# Patient Record
Sex: MALE | Race: WHITE | Employment: PART TIME | URBAN - METROPOLITAN AREA
[De-identification: names, ages, dates, MRNs, and addresses within clinical notes are randomized per-mention and may not be internally consistent; named-entity substitution may affect disease eponyms.]

---

## 2019-03-28 ENCOUNTER — TELEPHONE (OUTPATIENT)
Dept: GASTROENTEROLOGY | Age: 34
End: 2019-03-28

## 2019-04-10 ENCOUNTER — HOSPITAL ENCOUNTER (OUTPATIENT)
Age: 34
Discharge: HOME | End: 2019-04-10
Payer: COMMERCIAL

## 2019-04-10 PROCEDURE — 93005 ELECTROCARDIOGRAM TRACING: CPT

## 2019-04-11 LAB
EKG ATRIAL RATE: 74 BPM
EKG P AXIS: 59 DEGREES
EKG P-R INTERVAL: 138 MS
EKG Q-T INTERVAL: 410 MS
EKG QRS DURATION: 96 MS
EKG QTC CALCULATION (BAZETT): 455 MS
EKG R AXIS: 73 DEGREES
EKG T AXIS: 47 DEGREES
EKG VENTRICULAR RATE: 74 BPM

## 2019-08-30 ENCOUNTER — TELEPHONE (OUTPATIENT)
Dept: GASTROENTEROLOGY | Age: 34
End: 2019-08-30

## 2019-09-03 ENCOUNTER — OFFICE VISIT (OUTPATIENT)
Dept: GASTROENTEROLOGY | Age: 34
End: 2019-09-03
Payer: COMMERCIAL

## 2019-09-03 ENCOUNTER — HOSPITAL ENCOUNTER (OUTPATIENT)
Age: 34
Discharge: HOME | End: 2019-09-03
Payer: COMMERCIAL

## 2019-09-03 VITALS
HEART RATE: 92 BPM | SYSTOLIC BLOOD PRESSURE: 131 MMHG | BODY MASS INDEX: 19.29 KG/M2 | DIASTOLIC BLOOD PRESSURE: 88 MMHG | WEIGHT: 146.2 LBS

## 2019-09-03 DIAGNOSIS — B18.2 CHRONIC HEPATITIS C WITHOUT HEPATIC COMA (HCC): Primary | ICD-10-CM

## 2019-09-03 DIAGNOSIS — F19.10 DRUG ABUSE (HCC): ICD-10-CM

## 2019-09-03 DIAGNOSIS — R53.1 WEAKNESS: ICD-10-CM

## 2019-09-03 DIAGNOSIS — B18.2 CHRONIC HEPATITIS C WITHOUT HEPATIC COMA (HCC): ICD-10-CM

## 2019-09-03 DIAGNOSIS — R10.11 ABDOMINAL PAIN, RIGHT UPPER QUADRANT: ICD-10-CM

## 2019-09-03 LAB
ABSOLUTE EOS #: 0.3 K/UL (ref 0–0.4)
ABSOLUTE IMMATURE GRANULOCYTE: ABNORMAL K/UL (ref 0–0.3)
ABSOLUTE LYMPH #: 1.9 K/UL (ref 1–4.8)
ABSOLUTE MONO #: 0.4 K/UL (ref 0.1–1.3)
AFP: 5.8 UG/L
ALBUMIN SERPL-MCNC: 5.2 G/DL (ref 3.5–5.2)
ALBUMIN/GLOBULIN RATIO: ABNORMAL (ref 1–2.5)
ALP BLD-CCNC: 96 U/L (ref 40–129)
ALPHA-1 ANTITRYPSIN: 146 MG/DL (ref 90–200)
ALT SERPL-CCNC: 24 U/L (ref 5–41)
ANION GAP SERPL CALCULATED.3IONS-SCNC: 16 MMOL/L (ref 9–17)
AST SERPL-CCNC: 22 U/L
BASOPHILS # BLD: 1 % (ref 0–2)
BASOPHILS ABSOLUTE: 0 K/UL (ref 0–0.2)
BILIRUB SERPL-MCNC: 0.28 MG/DL (ref 0.3–1.2)
BUN BLDV-MCNC: 11 MG/DL (ref 6–20)
BUN/CREAT BLD: ABNORMAL (ref 9–20)
CALCIUM SERPL-MCNC: 10.1 MG/DL (ref 8.6–10.4)
CERULOPLASMIN: 21 MG/DL (ref 15–30)
CHLORIDE BLD-SCNC: 100 MMOL/L (ref 98–107)
CO2: 24 MMOL/L (ref 20–31)
CREAT SERPL-MCNC: 0.8 MG/DL (ref 0.7–1.2)
DIFFERENTIAL TYPE: ABNORMAL
EOSINOPHILS RELATIVE PERCENT: 5 % (ref 0–4)
FERRITIN: 502 UG/L (ref 30–400)
GFR AFRICAN AMERICAN: >60 ML/MIN
GFR NON-AFRICAN AMERICAN: >60 ML/MIN
GFR SERPL CREATININE-BSD FRML MDRD: ABNORMAL ML/MIN/{1.73_M2}
GFR SERPL CREATININE-BSD FRML MDRD: ABNORMAL ML/MIN/{1.73_M2}
GLUCOSE BLD-MCNC: 79 MG/DL (ref 70–99)
HAV AB SERPL IA-ACNC: NONREACTIVE
HBV SURFACE AB TITR SER: 4.67 MIU/ML
HCT VFR BLD CALC: 37.2 % (ref 41–53)
HEMOGLOBIN: 12.6 G/DL (ref 13.5–17.5)
HEPATITIS B CORE TOTAL ANTIBODY: NONREACTIVE
HEPATITIS B SURFACE ANTIGEN: NONREACTIVE
IMMATURE GRANULOCYTES: ABNORMAL %
LYMPHOCYTES # BLD: 35 % (ref 24–44)
MCH RBC QN AUTO: 30.2 PG (ref 26–34)
MCHC RBC AUTO-ENTMCNC: 34 G/DL (ref 31–37)
MCV RBC AUTO: 88.9 FL (ref 80–100)
MONOCYTES # BLD: 7 % (ref 1–7)
NRBC AUTOMATED: ABNORMAL PER 100 WBC
PDW BLD-RTO: 13 % (ref 11.5–14.9)
PLATELET # BLD: 237 K/UL (ref 150–450)
PLATELET ESTIMATE: ABNORMAL
PMV BLD AUTO: 8.3 FL (ref 6–12)
POTASSIUM SERPL-SCNC: 3.8 MMOL/L (ref 3.7–5.3)
RBC # BLD: 4.19 M/UL (ref 4.5–5.9)
RBC # BLD: ABNORMAL 10*6/UL
SEG NEUTROPHILS: 52 % (ref 36–66)
SEGMENTED NEUTROPHILS ABSOLUTE COUNT: 3 K/UL (ref 1.3–9.1)
SODIUM BLD-SCNC: 140 MMOL/L (ref 135–144)
TOTAL PROTEIN: 8.4 G/DL (ref 6.4–8.3)
WBC # BLD: 5.6 K/UL (ref 3.5–11)
WBC # BLD: ABNORMAL 10*3/UL

## 2019-09-03 PROCEDURE — 82728 ASSAY OF FERRITIN: CPT

## 2019-09-03 PROCEDURE — 82390 ASSAY OF CERULOPLASMIN: CPT

## 2019-09-03 PROCEDURE — 85025 COMPLETE CBC W/AUTO DIFF WBC: CPT

## 2019-09-03 PROCEDURE — 86704 HEP B CORE ANTIBODY TOTAL: CPT

## 2019-09-03 PROCEDURE — 86708 HEPATITIS A ANTIBODY: CPT

## 2019-09-03 PROCEDURE — 87902 NFCT AGT GNTYP ALYS HEP C: CPT

## 2019-09-03 PROCEDURE — 82977 ASSAY OF GGT: CPT

## 2019-09-03 PROCEDURE — 80053 COMPREHEN METABOLIC PANEL: CPT

## 2019-09-03 PROCEDURE — 82105 ALPHA-FETOPROTEIN SERUM: CPT

## 2019-09-03 PROCEDURE — 87340 HEPATITIS B SURFACE AG IA: CPT

## 2019-09-03 PROCEDURE — 86317 IMMUNOASSAY INFECTIOUS AGENT: CPT

## 2019-09-03 PROCEDURE — G8427 DOCREV CUR MEDS BY ELIG CLIN: HCPCS | Performed by: INTERNAL MEDICINE

## 2019-09-03 PROCEDURE — 84460 ALANINE AMINO (ALT) (SGPT): CPT

## 2019-09-03 PROCEDURE — 87522 HEPATITIS C REVRS TRNSCRPJ: CPT

## 2019-09-03 PROCEDURE — 82103 ALPHA-1-ANTITRYPSIN TOTAL: CPT

## 2019-09-03 PROCEDURE — G8420 CALC BMI NORM PARAMETERS: HCPCS | Performed by: INTERNAL MEDICINE

## 2019-09-03 PROCEDURE — 99244 OFF/OP CNSLTJ NEW/EST MOD 40: CPT | Performed by: INTERNAL MEDICINE

## 2019-09-03 PROCEDURE — 86038 ANTINUCLEAR ANTIBODIES: CPT

## 2019-09-03 PROCEDURE — 84520 ASSAY OF UREA NITROGEN: CPT

## 2019-09-03 PROCEDURE — 84450 TRANSFERASE (AST) (SGOT): CPT

## 2019-09-03 PROCEDURE — 83516 IMMUNOASSAY NONANTIBODY: CPT

## 2019-09-03 PROCEDURE — 83883 ASSAY NEPHELOMETRY NOT SPEC: CPT

## 2019-09-03 PROCEDURE — 36415 COLL VENOUS BLD VENIPUNCTURE: CPT

## 2019-09-03 RX ORDER — METHADONE HYDROCHLORIDE 10 MG/5ML
205 SOLUTION ORAL
COMMUNITY

## 2019-09-03 ASSESSMENT — ENCOUNTER SYMPTOMS
BACK PAIN: 1
SORE THROAT: 0
DIARRHEA: 0
CHOKING: 0
BLOOD IN STOOL: 0
TROUBLE SWALLOWING: 0
RESPIRATORY NEGATIVE: 1
SINUS PRESSURE: 0
WHEEZING: 0
COUGH: 0
ALLERGIC/IMMUNOLOGIC NEGATIVE: 1
ABDOMINAL DISTENTION: 0
RECTAL PAIN: 0
CONSTIPATION: 1
ANAL BLEEDING: 1
VOICE CHANGE: 0
NAUSEA: 0
EYES NEGATIVE: 1
VOMITING: 0
ABDOMINAL PAIN: 1

## 2019-09-03 NOTE — PROGRESS NOTES
Subjective:      Patient ID: Teri Lo is a 36 y.o. male. HPI    Dr. Chante Grant PA has requested that I see Teri Lo for a consult for   1. Chronic hepatitis C without hepatic coma ([facility] [address]    2. Weakness    3. Drug abuse ([facility] [address]    4. Abdominal pain, right upper quadrant     . This patient is seen in my office for the first time with his fiancee  He has hx of IVDA   Has hx of Narcotics pain meds use  Has been diagnosed with Hep C  Has some fatigue  Has non specific RUQ pains  Has no overt bleeding  Has no melena  Has mild fatigue  Patient has been complaining of some abdominal pains, off and on cramping  Also complains of abdominal bloating and gas  Has off and on nausea without any sig vomiting  Has some alternating constipation and diarrhea  Has no weight loss  Has some anxiety issues    Past Medical, Family, and Social History reviewed and does contribute to the patient presenting condition. patient\"s PMH/PSH,SH,PSYCH hx, MEDs, ALLERGIES, and ROS was all reviewed and updated ion the appropriate sections    Review of Systems   Constitutional: Positive for fatigue. Negative for appetite change and unexpected weight change. HENT: Negative. Negative for dental problem, postnasal drip, sinus pressure, sore throat, trouble swallowing and voice change. Eyes: Negative. Negative for visual disturbance. Respiratory: Negative. Negative for cough, choking and wheezing. Cardiovascular: Negative. Negative for chest pain, palpitations and leg swelling. Gastrointestinal: Positive for abdominal pain, anal bleeding and constipation. Negative for abdominal distention, blood in stool, diarrhea, nausea, rectal pain and vomiting. Endocrine: Negative. Genitourinary: Negative. Negative for difficulty urinating. Musculoskeletal: Positive for arthralgias and back pain. Negative for gait problem and myalgias. Skin: Negative. Allergic/Immunologic: Negative.   Negative for environmental allergies and food allergies. Neurological: Positive for weakness. Negative for dizziness, light-headedness, numbness and headaches. Hematological: Negative. Does not bruise/bleed easily. Psychiatric/Behavioral: Positive for sleep disturbance. The patient is nervous/anxious. Reviewed and agree  Objective:   Physical Exam   Constitutional: He is oriented to person, place, and time. He appears well-developed and well-nourished. Anxious    HENT:   Head: Normocephalic and atraumatic. Eyes: Pupils are equal, round, and reactive to light. Conjunctivae and EOM are normal.   Neck: Normal range of motion. Neck supple. Cardiovascular: Normal rate and regular rhythm. Pulmonary/Chest: Effort normal and breath sounds normal.   Abdominal: Soft. Bowel sounds are normal.   NON TENDER, NON DISTENTED  LIVER SPLEEN AND HERNIAS ARE NOT  PALPABLE  BOWEL SOUNDS ARE POSITIVE      Genitourinary: Rectum normal.   Musculoskeletal: Normal range of motion. Neurological: He is alert and oriented to person, place, and time. He has normal reflexes. Skin: Skin is warm. Vitals reviewed. Assessment:      Patient Active Problem List   Diagnosis    Viral hepatitis C           Plan: This patient has been diagnosed with hep C. The significance of the diagnosis was explained to him in detail  Probable mode of transmission were discussed with him  The natural course of hep C was explained to him   The current modes available for the treatment of Hep C including the Medicines were explained to him in detail. Newer treatment options and their success rates were also explained to him in detail.      The side effect profile of these medicines were explained to him in detail as well  The significance of quitting Alcohol was also explained     he was explained about the importance of copmplaince  with the treatment and regular follow up    Will order necessary blood tests and Imaging studies for further

## 2019-09-04 LAB — ANTI-NUCLEAR ANTIBODY (ANA): NEGATIVE

## 2019-09-05 LAB
ALANINE AMINOTRANSFERASE, FIBROMETER: 29 U/L (ref 5–50)
ALPHA-2-MACROGLOBULIN, FIBROMETER: 198 MG/DL (ref 131–293)
ASPARTATE AMINOTRANSFERASE, FIBROMETER: 27 U/L (ref 9–50)
CIRRHOMETER PATIENT SCORE: 0.01
EER FIBROMETER REPORT: NORMAL
FIBROMETER INTERPRETATION: NORMAL
FIBROMETER PATIENT SCORE: 0.21
FIBROMETER PLATELET COUNT: 241
FIBROMETER PROTHROMBIN INDEX: 98 % (ref 90–120)
FIBROSIS METAVIR CLASSIFICATION: NORMAL
GAMMA GLUTAMYL TRANSFERASE, FIBROMETER: 29 U/L (ref 7–51)
INFLAMETER METAVIR CLASSIFICATION: NORMAL
INFLAMETER PATIENT SCORE: 0.25
MITOCHONDRIAL ANTIBODY: 6.3 UNITS (ref 0–20)
SMOOTH MUSCLE ANTIBODY: 6 UNITS (ref 0–19)
UREA NITROGEN, FIBROMETER: 11 MG/DL (ref 7–20)

## 2019-09-06 LAB
DIRECT EXAM: ABNORMAL
DIRECT EXAM: ABNORMAL
Lab: ABNORMAL
SPECIMEN DESCRIPTION: ABNORMAL

## 2019-09-08 LAB
HCV QUANTITATIVE: NORMAL
HEPATITIS C GENOTYPE: NORMAL

## 2019-09-17 ENCOUNTER — TELEPHONE (OUTPATIENT)
Dept: GASTROENTEROLOGY | Age: 34
End: 2019-09-17

## 2019-09-17 NOTE — TELEPHONE ENCOUNTER
Pt returned call. States he didn't know that he needed an US. Did give pt central scheduling number to call and did ask him to call writer back to let us know when it is scheduled. Also, did schedule pt for vaccination on 10/8/19.

## 2019-09-27 ENCOUNTER — HOSPITAL ENCOUNTER (OUTPATIENT)
Dept: ULTRASOUND IMAGING | Age: 34
Discharge: HOME | End: 2019-09-29
Payer: COMMERCIAL

## 2019-09-27 DIAGNOSIS — B18.2 CHRONIC HEPATITIS C WITHOUT HEPATIC COMA (HCC): ICD-10-CM

## 2019-09-27 PROCEDURE — 76705 ECHO EXAM OF ABDOMEN: CPT

## 2019-10-08 ENCOUNTER — NURSE ONLY (OUTPATIENT)
Dept: GASTROENTEROLOGY | Age: 34
End: 2019-10-08
Payer: COMMERCIAL

## 2019-10-08 VITALS — HEART RATE: 76 BPM | SYSTOLIC BLOOD PRESSURE: 132 MMHG | TEMPERATURE: 97.2 F | DIASTOLIC BLOOD PRESSURE: 83 MMHG

## 2019-10-08 DIAGNOSIS — Q45.3 PANCREATIC DUCTAL ABNORMALITY: ICD-10-CM

## 2019-10-08 DIAGNOSIS — R10.11 ABDOMINAL PAIN, RIGHT UPPER QUADRANT: ICD-10-CM

## 2019-10-08 DIAGNOSIS — Z23 NEED FOR HEPATITIS A AND B VACCINATION: ICD-10-CM

## 2019-10-08 DIAGNOSIS — B18.2 CHRONIC HEPATITIS C WITHOUT HEPATIC COMA (HCC): Primary | ICD-10-CM

## 2019-10-08 PROCEDURE — 90632 HEPA VACCINE ADULT IM: CPT | Performed by: INTERNAL MEDICINE

## 2019-10-08 PROCEDURE — 90471 IMMUNIZATION ADMIN: CPT | Performed by: INTERNAL MEDICINE

## 2019-10-08 PROCEDURE — 90746 HEPB VACCINE 3 DOSE ADULT IM: CPT | Performed by: INTERNAL MEDICINE

## 2019-10-08 PROCEDURE — 90472 IMMUNIZATION ADMIN EACH ADD: CPT | Performed by: INTERNAL MEDICINE

## 2019-10-15 ENCOUNTER — HOSPITAL ENCOUNTER (OUTPATIENT)
Dept: MRI IMAGING | Age: 34
Discharge: HOME | End: 2019-10-17
Payer: COMMERCIAL

## 2019-10-15 DIAGNOSIS — R10.11 ABDOMINAL PAIN, RIGHT UPPER QUADRANT: ICD-10-CM

## 2019-10-15 DIAGNOSIS — B18.2 CHRONIC HEPATITIS C WITHOUT HEPATIC COMA (HCC): ICD-10-CM

## 2019-10-15 DIAGNOSIS — Q45.3 PANCREATIC DUCTAL ABNORMALITY: ICD-10-CM

## 2019-10-15 PROCEDURE — A9576 INJ PROHANCE MULTIPACK: HCPCS | Performed by: INTERNAL MEDICINE

## 2019-10-15 PROCEDURE — 6360000004 HC RX CONTRAST MEDICATION: Performed by: INTERNAL MEDICINE

## 2019-10-15 PROCEDURE — 74183 MRI ABD W/O CNTR FLWD CNTR: CPT

## 2019-10-15 RX ORDER — SODIUM CHLORIDE 0.9 % (FLUSH) 0.9 %
10 SYRINGE (ML) INJECTION 2 TIMES DAILY
Status: DISCONTINUED | OUTPATIENT
Start: 2019-10-15 | End: 2019-10-18 | Stop reason: HOSPADM

## 2019-10-15 RX ADMIN — GADOTERIDOL 13 ML: 279.3 INJECTION, SOLUTION INTRAVENOUS at 10:36

## 2019-10-23 ENCOUNTER — TELEPHONE (OUTPATIENT)
Dept: GASTROENTEROLOGY | Age: 34
End: 2019-10-23

## 2019-11-12 ENCOUNTER — NURSE ONLY (OUTPATIENT)
Dept: GASTROENTEROLOGY | Age: 34
End: 2019-11-12
Payer: COMMERCIAL

## 2019-11-12 VITALS — HEART RATE: 82 BPM | SYSTOLIC BLOOD PRESSURE: 131 MMHG | TEMPERATURE: 97.1 F | DIASTOLIC BLOOD PRESSURE: 89 MMHG

## 2019-11-12 DIAGNOSIS — B18.2 CHRONIC HEPATITIS C WITHOUT HEPATIC COMA (HCC): Primary | ICD-10-CM

## 2019-11-12 DIAGNOSIS — Z23 NEED FOR HEPATITIS A AND B VACCINATION: ICD-10-CM

## 2019-11-12 PROCEDURE — 90471 IMMUNIZATION ADMIN: CPT | Performed by: INTERNAL MEDICINE

## 2019-11-12 PROCEDURE — 90746 HEPB VACCINE 3 DOSE ADULT IM: CPT | Performed by: INTERNAL MEDICINE

## 2019-11-12 PROCEDURE — 99999 PR OFFICE/OUTPT VISIT,PROCEDURE ONLY: CPT | Performed by: INTERNAL MEDICINE

## 2020-02-11 ENCOUNTER — TELEPHONE (OUTPATIENT)
Dept: GASTROENTEROLOGY | Age: 35
End: 2020-02-11

## 2020-08-14 ENCOUNTER — TELEPHONE (OUTPATIENT)
Dept: GASTROENTEROLOGY | Age: 35
End: 2020-08-14

## 2020-08-14 NOTE — TELEPHONE ENCOUNTER
Left message informing patient that appointment on 9/21 will be rescheduled to 9/29 at 300 pm due to the provider being out of the office. If this does not work for the patient please reschedule. complains of pain/discomfort

## 2020-11-02 ENCOUNTER — TELEPHONE (OUTPATIENT)
Dept: GASTROENTEROLOGY | Age: 35
End: 2020-11-02

## 2021-07-06 ENCOUNTER — APPOINTMENT (OUTPATIENT)
Dept: CT IMAGING | Age: 36
End: 2021-07-06
Payer: COMMERCIAL

## 2021-07-06 ENCOUNTER — HOSPITAL ENCOUNTER (OUTPATIENT)
Age: 36
Setting detail: OBSERVATION
Discharge: HOME | End: 2021-07-06
Attending: EMERGENCY MEDICINE | Admitting: EMERGENCY MEDICINE
Payer: COMMERCIAL

## 2021-07-06 VITALS
WEIGHT: 150 LBS | SYSTOLIC BLOOD PRESSURE: 126 MMHG | RESPIRATION RATE: 18 BRPM | HEIGHT: 73 IN | BODY MASS INDEX: 19.88 KG/M2 | DIASTOLIC BLOOD PRESSURE: 68 MMHG | TEMPERATURE: 98.2 F | HEART RATE: 77 BPM | OXYGEN SATURATION: 98 %

## 2021-07-06 DIAGNOSIS — F13.930 BENZODIAZEPINE WITHDRAWAL WITHOUT COMPLICATION (HCC): Primary | ICD-10-CM

## 2021-07-06 DIAGNOSIS — R56.9 SEIZURE (HCC): ICD-10-CM

## 2021-07-06 LAB
ABSOLUTE EOS #: 0 K/UL (ref 0–0.4)
ABSOLUTE IMMATURE GRANULOCYTE: 0.1 K/UL (ref 0–0.3)
ABSOLUTE LYMPH #: 0.97 K/UL (ref 1–4.8)
ABSOLUTE MONO #: 0.19 K/UL (ref 0.1–0.8)
ACETAMINOPHEN LEVEL: <5 UG/ML (ref 10–30)
ANION GAP SERPL CALCULATED.3IONS-SCNC: 22 MMOL/L (ref 9–17)
BASOPHILS # BLD: 0 % (ref 0–2)
BASOPHILS ABSOLUTE: 0 K/UL (ref 0–0.2)
BUN BLDV-MCNC: 6 MG/DL (ref 6–20)
BUN/CREAT BLD: ABNORMAL (ref 9–20)
CALCIUM SERPL-MCNC: 9.9 MG/DL (ref 8.6–10.4)
CHLORIDE BLD-SCNC: 98 MMOL/L (ref 98–107)
CHP ED QC CHECK: YES
CO2: 15 MMOL/L (ref 20–31)
CREAT SERPL-MCNC: 0.86 MG/DL (ref 0.7–1.2)
DIFFERENTIAL TYPE: ABNORMAL
EOSINOPHILS RELATIVE PERCENT: 0 % (ref 1–4)
ETHANOL PERCENT: <0.01 %
ETHANOL: <10 MG/DL
GFR AFRICAN AMERICAN: >60 ML/MIN
GFR NON-AFRICAN AMERICAN: >60 ML/MIN
GFR SERPL CREATININE-BSD FRML MDRD: ABNORMAL ML/MIN/{1.73_M2}
GFR SERPL CREATININE-BSD FRML MDRD: ABNORMAL ML/MIN/{1.73_M2}
GLUCOSE BLD-MCNC: 147 MG/DL
GLUCOSE BLD-MCNC: 147 MG/DL (ref 75–110)
GLUCOSE BLD-MCNC: 175 MG/DL (ref 70–99)
HCT VFR BLD CALC: 41.6 % (ref 40.7–50.3)
HEMOGLOBIN: 13.6 G/DL (ref 13–17)
IMMATURE GRANULOCYTES: 1 %
LYMPHOCYTES # BLD: 10 % (ref 24–44)
MCH RBC QN AUTO: 28.8 PG (ref 25.2–33.5)
MCHC RBC AUTO-ENTMCNC: 32.7 G/DL (ref 28.4–34.8)
MCV RBC AUTO: 87.9 FL (ref 82.6–102.9)
MONOCYTES # BLD: 2 % (ref 1–7)
MORPHOLOGY: NORMAL
MYOGLOBIN: 383 NG/ML (ref 28–72)
NRBC AUTOMATED: 0 PER 100 WBC
PDW BLD-RTO: 12.5 % (ref 11.8–14.4)
PLATELET # BLD: 346 K/UL (ref 138–453)
PLATELET ESTIMATE: ABNORMAL
PMV BLD AUTO: 10.7 FL (ref 8.1–13.5)
POTASSIUM SERPL-SCNC: 3.9 MMOL/L (ref 3.7–5.3)
RBC # BLD: 4.73 M/UL (ref 4.21–5.77)
RBC # BLD: ABNORMAL 10*6/UL
SALICYLATE LEVEL: <1 MG/DL (ref 3–10)
SEG NEUTROPHILS: 87 % (ref 36–66)
SEGMENTED NEUTROPHILS ABSOLUTE COUNT: 8.44 K/UL (ref 1.8–7.7)
SODIUM BLD-SCNC: 135 MMOL/L (ref 135–144)
TOTAL CK: 194 U/L (ref 39–308)
TOXIC TRICYCLIC SC,BLOOD: NEGATIVE
WBC # BLD: 9.7 K/UL (ref 3.5–11.3)
WBC # BLD: ABNORMAL 10*3/UL

## 2021-07-06 PROCEDURE — 80143 DRUG ASSAY ACETAMINOPHEN: CPT

## 2021-07-06 PROCEDURE — 83874 ASSAY OF MYOGLOBIN: CPT

## 2021-07-06 PROCEDURE — 70450 CT HEAD/BRAIN W/O DYE: CPT

## 2021-07-06 PROCEDURE — 6360000002 HC RX W HCPCS: Performed by: STUDENT IN AN ORGANIZED HEALTH CARE EDUCATION/TRAINING PROGRAM

## 2021-07-06 PROCEDURE — 96372 THER/PROPH/DIAG INJ SC/IM: CPT

## 2021-07-06 PROCEDURE — G0378 HOSPITAL OBSERVATION PER HR: HCPCS

## 2021-07-06 PROCEDURE — 80048 BASIC METABOLIC PNL TOTAL CA: CPT

## 2021-07-06 PROCEDURE — 6370000000 HC RX 637 (ALT 250 FOR IP): Performed by: STUDENT IN AN ORGANIZED HEALTH CARE EDUCATION/TRAINING PROGRAM

## 2021-07-06 PROCEDURE — 82550 ASSAY OF CK (CPK): CPT

## 2021-07-06 PROCEDURE — 93005 ELECTROCARDIOGRAM TRACING: CPT | Performed by: EMERGENCY MEDICINE

## 2021-07-06 PROCEDURE — 80307 DRUG TEST PRSMV CHEM ANLYZR: CPT

## 2021-07-06 PROCEDURE — 2580000003 HC RX 258: Performed by: STUDENT IN AN ORGANIZED HEALTH CARE EDUCATION/TRAINING PROGRAM

## 2021-07-06 PROCEDURE — 99285 EMERGENCY DEPT VISIT HI MDM: CPT

## 2021-07-06 PROCEDURE — 85025 COMPLETE CBC W/AUTO DIFF WBC: CPT

## 2021-07-06 PROCEDURE — 96374 THER/PROPH/DIAG INJ IV PUSH: CPT

## 2021-07-06 PROCEDURE — 80179 DRUG ASSAY SALICYLATE: CPT

## 2021-07-06 PROCEDURE — G0480 DRUG TEST DEF 1-7 CLASSES: HCPCS

## 2021-07-06 PROCEDURE — 82947 ASSAY GLUCOSE BLOOD QUANT: CPT

## 2021-07-06 RX ORDER — ACETAMINOPHEN 650 MG/1
650 SUPPOSITORY RECTAL EVERY 6 HOURS PRN
Status: DISCONTINUED | OUTPATIENT
Start: 2021-07-06 | End: 2021-07-06 | Stop reason: HOSPADM

## 2021-07-06 RX ORDER — METHADONE HYDROCHLORIDE 5 MG/5ML
205 SOLUTION ORAL DAILY
Status: DISCONTINUED | OUTPATIENT
Start: 2021-07-06 | End: 2021-07-06 | Stop reason: HOSPADM

## 2021-07-06 RX ORDER — SODIUM CHLORIDE 9 MG/ML
25 INJECTION, SOLUTION INTRAVENOUS PRN
Status: DISCONTINUED | OUTPATIENT
Start: 2021-07-06 | End: 2021-07-06 | Stop reason: SDUPTHER

## 2021-07-06 RX ORDER — ACETAMINOPHEN 325 MG/1
650 TABLET ORAL EVERY 4 HOURS PRN
Status: DISCONTINUED | OUTPATIENT
Start: 2021-07-06 | End: 2021-07-06 | Stop reason: SDUPTHER

## 2021-07-06 RX ORDER — ONDANSETRON 4 MG/1
4 TABLET, ORALLY DISINTEGRATING ORAL EVERY 8 HOURS PRN
Status: DISCONTINUED | OUTPATIENT
Start: 2021-07-06 | End: 2021-07-06

## 2021-07-06 RX ORDER — SODIUM CHLORIDE 0.9 % (FLUSH) 0.9 %
5-40 SYRINGE (ML) INJECTION PRN
Status: DISCONTINUED | OUTPATIENT
Start: 2021-07-06 | End: 2021-07-06 | Stop reason: SDUPTHER

## 2021-07-06 RX ORDER — LORAZEPAM 1 MG/1
1 TABLET ORAL
Status: DISCONTINUED | OUTPATIENT
Start: 2021-07-06 | End: 2021-07-06 | Stop reason: HOSPADM

## 2021-07-06 RX ORDER — LORAZEPAM 2 MG/ML
1 INJECTION INTRAMUSCULAR
Status: DISCONTINUED | OUTPATIENT
Start: 2021-07-06 | End: 2021-07-06 | Stop reason: HOSPADM

## 2021-07-06 RX ORDER — LORAZEPAM 2 MG/1
4 TABLET ORAL
Status: DISCONTINUED | OUTPATIENT
Start: 2021-07-06 | End: 2021-07-06 | Stop reason: HOSPADM

## 2021-07-06 RX ORDER — LORAZEPAM 2 MG/ML
2 INJECTION INTRAMUSCULAR ONCE
Status: COMPLETED | OUTPATIENT
Start: 2021-07-06 | End: 2021-07-06

## 2021-07-06 RX ORDER — LORAZEPAM 2 MG/ML
2 INJECTION INTRAMUSCULAR ONCE
Status: DISCONTINUED | OUTPATIENT
Start: 2021-07-06 | End: 2021-07-06

## 2021-07-06 RX ORDER — SODIUM CHLORIDE, SODIUM LACTATE, POTASSIUM CHLORIDE, AND CALCIUM CHLORIDE .6; .31; .03; .02 G/100ML; G/100ML; G/100ML; G/100ML
1000 INJECTION, SOLUTION INTRAVENOUS ONCE
Status: COMPLETED | OUTPATIENT
Start: 2021-07-06 | End: 2021-07-06

## 2021-07-06 RX ORDER — LORAZEPAM 2 MG/ML
4 INJECTION INTRAMUSCULAR
Status: DISCONTINUED | OUTPATIENT
Start: 2021-07-06 | End: 2021-07-06 | Stop reason: HOSPADM

## 2021-07-06 RX ORDER — LORAZEPAM 2 MG/1
2 TABLET ORAL
Status: DISCONTINUED | OUTPATIENT
Start: 2021-07-06 | End: 2021-07-06 | Stop reason: HOSPADM

## 2021-07-06 RX ORDER — LORAZEPAM 2 MG/ML
3 INJECTION INTRAMUSCULAR
Status: DISCONTINUED | OUTPATIENT
Start: 2021-07-06 | End: 2021-07-06 | Stop reason: HOSPADM

## 2021-07-06 RX ORDER — CLONAZEPAM 1 MG/1
1 TABLET ORAL 2 TIMES DAILY PRN
Status: DISCONTINUED | OUTPATIENT
Start: 2021-07-06 | End: 2021-07-06 | Stop reason: HOSPADM

## 2021-07-06 RX ORDER — ACETAMINOPHEN 325 MG/1
650 TABLET ORAL EVERY 6 HOURS PRN
Status: DISCONTINUED | OUTPATIENT
Start: 2021-07-06 | End: 2021-07-06 | Stop reason: HOSPADM

## 2021-07-06 RX ORDER — SODIUM CHLORIDE 9 MG/ML
25 INJECTION, SOLUTION INTRAVENOUS PRN
Status: DISCONTINUED | OUTPATIENT
Start: 2021-07-06 | End: 2021-07-06 | Stop reason: HOSPADM

## 2021-07-06 RX ORDER — SODIUM CHLORIDE 0.9 % (FLUSH) 0.9 %
5-40 SYRINGE (ML) INJECTION EVERY 12 HOURS SCHEDULED
Status: DISCONTINUED | OUTPATIENT
Start: 2021-07-06 | End: 2021-07-06 | Stop reason: SDUPTHER

## 2021-07-06 RX ORDER — SODIUM CHLORIDE 0.9 % (FLUSH) 0.9 %
5-40 SYRINGE (ML) INJECTION EVERY 12 HOURS SCHEDULED
Status: DISCONTINUED | OUTPATIENT
Start: 2021-07-06 | End: 2021-07-06 | Stop reason: HOSPADM

## 2021-07-06 RX ORDER — ONDANSETRON 4 MG/1
4 TABLET, ORALLY DISINTEGRATING ORAL EVERY 8 HOURS PRN
Status: DISCONTINUED | OUTPATIENT
Start: 2021-07-06 | End: 2021-07-06 | Stop reason: HOSPADM

## 2021-07-06 RX ORDER — CLONAZEPAM 1 MG/1
1 TABLET ORAL 2 TIMES DAILY PRN
Qty: 30 TABLET | Refills: 0 | Status: SHIPPED | OUTPATIENT
Start: 2021-07-06 | End: 2021-07-23 | Stop reason: SDUPTHER

## 2021-07-06 RX ORDER — LORAZEPAM 2 MG/ML
2 INJECTION INTRAMUSCULAR
Status: DISCONTINUED | OUTPATIENT
Start: 2021-07-06 | End: 2021-07-06 | Stop reason: HOSPADM

## 2021-07-06 RX ORDER — SODIUM CHLORIDE 0.9 % (FLUSH) 0.9 %
5-40 SYRINGE (ML) INJECTION PRN
Status: DISCONTINUED | OUTPATIENT
Start: 2021-07-06 | End: 2021-07-06 | Stop reason: HOSPADM

## 2021-07-06 RX ORDER — ONDANSETRON 2 MG/ML
4 INJECTION INTRAMUSCULAR; INTRAVENOUS EVERY 6 HOURS PRN
Status: DISCONTINUED | OUTPATIENT
Start: 2021-07-06 | End: 2021-07-06

## 2021-07-06 RX ORDER — POLYETHYLENE GLYCOL 3350 17 G/17G
17 POWDER, FOR SOLUTION ORAL DAILY PRN
Status: DISCONTINUED | OUTPATIENT
Start: 2021-07-06 | End: 2021-07-06 | Stop reason: HOSPADM

## 2021-07-06 RX ORDER — ONDANSETRON 2 MG/ML
4 INJECTION INTRAMUSCULAR; INTRAVENOUS EVERY 6 HOURS PRN
Status: DISCONTINUED | OUTPATIENT
Start: 2021-07-06 | End: 2021-07-06 | Stop reason: HOSPADM

## 2021-07-06 RX ORDER — METHADONE HYDROCHLORIDE 10 MG/1
205 TABLET ORAL EVERY 4 HOURS PRN
Status: DISCONTINUED | OUTPATIENT
Start: 2021-07-06 | End: 2021-07-06

## 2021-07-06 RX ADMIN — LORAZEPAM 2 MG: 2 INJECTION INTRAMUSCULAR; INTRAVENOUS at 02:41

## 2021-07-06 RX ADMIN — METHADONE HYDROCHLORIDE 205 MG: 5 SOLUTION ORAL at 10:17

## 2021-07-06 RX ADMIN — SODIUM CHLORIDE, POTASSIUM CHLORIDE, SODIUM LACTATE AND CALCIUM CHLORIDE 1000 ML: 600; 310; 30; 20 INJECTION, SOLUTION INTRAVENOUS at 05:35

## 2021-07-06 RX ADMIN — SODIUM CHLORIDE, PRESERVATIVE FREE 10 ML: 5 INJECTION INTRAVENOUS at 10:02

## 2021-07-06 RX ADMIN — LORAZEPAM 2 MG: 2 INJECTION INTRAMUSCULAR; INTRAVENOUS at 07:53

## 2021-07-06 RX ADMIN — LORAZEPAM 2 MG: 2 INJECTION INTRAMUSCULAR; INTRAVENOUS at 10:24

## 2021-07-06 ASSESSMENT — ENCOUNTER SYMPTOMS
SHORTNESS OF BREATH: 0
STRIDOR: 0
FACIAL SWELLING: 0
WHEEZING: 0
ABDOMINAL DISTENTION: 0
ABDOMINAL PAIN: 0
RHINORRHEA: 0
APNEA: 0

## 2021-07-06 ASSESSMENT — PAIN SCALES - GENERAL
PAINLEVEL_OUTOF10: 8
PAINLEVEL_OUTOF10: 0
PAINLEVEL_OUTOF10: 0

## 2021-07-06 NOTE — CONSULTS
[id number] [facility]   IN-PATIENT SERVICE      NEUROLOGY CONSULT  NOTE            Date:   7/6/2021  Patient name:  Keisha Wang  Date of admission:  7/6/2021  YOB: 1985      Chief Complaint:     Chief Complaint   Patient presents with    Seizures       Reason for Consult:      seizures    History of Present Illness: The patient is a 29 y.o. male  with history of WALT, MDD, heroin use- on Methadone, and withdrawal seizures was brought in by EMS after 3 episodes of generalized tonic clonic seizures. Patient is now back to his baseline mental state. He is having generalized tremors of all 4 extremities. Patient unable to recall having a seizure, however, he denies tongue biting, loss of bladder or bowels, head trauma. He states that he typically takes Xanax 1 mg daily for WALT and seizures, but has been out of his medication since 7/2/21. He reports that he's been feeling anxious and shaky since then. Patient states that his last seizure was many years ago. Chart review shows that patient has had multiple ED visits for benzodiazepine-related withdrawal seizures, last one recorded in 2014. Patient was given Ativan 2 mg IM in ER. CT head wo contrast negative for acute intracranial abnormality. Patient denies any alcohol or current illicit drug use. Past Medical History:     Past Medical History:   Diagnosis Date    Asthma     MRSA (methicillin resistant staph aureus) culture positive 9/1/2014    leg    Seizures ([facility] [address]     Viral hepatitis C         Past Surgical History:     History reviewed. No pertinent surgical history. Medications Prior to Admission:     Prior to Admission medications    Medication Sig Start Date End Date Taking? Authorizing Provider   methadone 10 MG/5ML solution 160 mg. Historical Provider, MD   diphenhydrAMINE (BENADRYL) 25 MG capsule Take 1 capsule by mouth every 6 hours as needed for Itching for up to 30 doses.  10/9/14   Trini Rascon PA-C ALPRAZolam (XANAX) 1 MG tablet Take 1 mg by mouth daily. Historical Provider, MD        Allergies:     Hydrocodone    Social History:     Tobacco:    reports that he has been smoking. He has never used smokeless tobacco.  Alcohol:      reports no history of alcohol use. Drug Use:  reports no history of drug use. Family History:     History reviewed. No pertinent family history. Review of Systems:       Constitutional Negative for fever and chills   HEENT Negative for ear discharge, ear pain, nosebleed   Eyes Negative for photophobia, pain and discharge   Respiratory Negative for hemoptysis and sputum   Cardiovascular Negative for orthopnea, claudication and PND   Gastrointestinal Negative for abdominal pain, diarrhea, blood in stool   Musculoskeletal Negative for joint pain, negative for myalgia   Skin Negative for rash or itching   hematology Negative for ecchymosis, anemia   Psychiatric Negative for suicidal ideation, positive for anxiety begative for depression, hallucinations       Physical Exam:   /69   Pulse 76   Temp 99 °F (37.2 °C) (Oral)   Resp 16   Ht 6' 1\" (1.854 m)   Wt 150 lb (68 kg)   SpO2 97%   BMI 19.79 kg/m²   Temp (24hrs), Av °F (37.2 °C), Min:99 °F (37.2 °C), Max:99 °F (37.2 °C)        General examination:      General Appearance:  alert, well appearing, and in no acute distress  HEENT: Normocephalic, atraumatic, moist mucus membranes  Neck: supple, no carotid bruits, (-) nuchal rigidity  Lungs:  Respirations unlabored, chest wall no deformity, BS normal  Cardiovascular: normal rate, regular rhythm  Abdomen: Soft, nontender, nondistended, normal bowel sounds  Skin: No gross lesions, rashes, bruising or bleeding on exposed skin area  Extremities:  peripheral pulses palpable, no cyanosis, clubbing or edema  Psych: normal affect      Neurological examination:      Mental status   Alert and oriented x 3; following all commands;   speech is fluent, no dysarthria, aphasia. Cranial nerves   II - visual fields intact to confrontation; pupils reactive  III, IV, VI - extraocular muscles intact; no FLO; no nystagmus; no ptosis   V - normal facial sensation                                                               VII - normal facial symmetry                                                             VIII - intact hearing                                                                             IX, X - symmetrical palate elevation                                               XI - symmetrical shoulder shrug                                                       XII - midline tongue without atrophy or fasciculation     Motor function  Strength:   5/5 RUE, 5/5 RLE  5/5 LUE, 5/5  LLE  Normal bulk and tone. Sensory function Intact to touch, pin, vibration, proprioception throughout     Cerebellar Intact finger-nose-finger testing. Intact heel-shin testing. No dysdiadochokinesia present. Positive for tremors of bilateral upper and lower extremities                      Reflex function 2/4 symmetric throughout . Downgoing plantar response bilaterally.  (-)Henderson's sign bilaterally      Gait                  Normal station and gait           Diagnostics:      Laboratory Testing:  CBC:   Recent Labs     07/06/21  0246   WBC 9.7   HGB 13.6        BMP:    Recent Labs     07/06/21  0246      K 3.9   CL 98   CO2 15*   BUN 6   CREATININE 0.86   GLUCOSE 175*         Lab Results   Component Value Date    ALT 24 09/03/2019    AST 22 09/03/2019       No results found for: PHENYTOIN, PHENYTOIN, VALPROATE, CBMZ      Imaging/Diagnostics:  CT HEAD WO CONTRAST    Result Date: 7/6/2021  EXAMINATION: CT OF THE HEAD WITHOUT CONTRAST  7/6/2021 3:55 am TECHNIQUE: CT of the head was performed without the administration of intravenous contrast. Dose modulation, iterative reconstruction, and/or weight based adjustment of the mA/kV was utilized to reduce the radiation dose to as low

## 2021-07-06 NOTE — PROGRESS NOTES
CDU Discharge Summary        Patient:  Thalia Sawyer  YOB: 1985    MRN: [id number]   Acct: [de-identified]    Primary Care Physician: No primary care provider on file. Admit date:  7/6/2021  2:16 AM  Discharge date: 7/6/2021    Discharge Diagnoses:     Acute seizure due to benzodiazepine withdrawal  Improved with Ativan. Follow-up:  Call today/tomorrow for a follow up appointment with your psychiatrist, or return to the Emergency Room with worsening symptoms    Stressed to patient the importance of following up with primary care doctor for further workup/management of symptoms. Pt verbalizes understanding and agrees with plan. Discharge Medications:  Changes to medications: We will discharge home on clonazepam        Gricellily June   Home Medication Instructions DOLORES:[id number]    Printed on:07/06/21 5153   Medication Information                      ALPRAZolam (XANAX) 1 MG tablet  Take 1 mg by mouth daily. clonazePAM (KLONOPIN) 1 MG tablet  Take 1 tablet by mouth 2 times daily as needed for Anxiety for up to 30 days. diphenhydrAMINE (BENADRYL) 25 MG capsule  Take 1 capsule by mouth every 6 hours as needed for Itching for up to 30 doses. methadone 10 MG/5ML solution  205 mg. Diet:  ADULT DIET;  Regular , Advance as tolerated     Activity:  As tolerated    Consultants: IP CONSULT TO NEUROLOGY  IP CONSULT TO SOCIAL WORK    Procedures:  Not indicated     Diagnostic Test:   Results for orders placed or performed during the hospital encounter of 07/06/21   CBC Auto Differential   Result Value Ref Range    WBC 9.7 3.5 - 11.3 k/uL    RBC 4.73 4.21 - 5.77 m/uL    Hemoglobin 13.6 13.0 - 17.0 g/dL    Hematocrit 41.6 40.7 - 50.3 %    MCV 87.9 82.6 - 102.9 fL    MCH 28.8 25.2 - 33.5 pg    MCHC 32.7 28.4 - 34.8 g/dL    RDW 12.5 11.8 - 14.4 %    Platelets 117 922 - 850 k/uL    MPV 10.7 8.1 - 13.5 fL    NRBC Automated 0.0 0.0 per 100 WBC Differential Type NOT REPORTED     WBC Morphology NOT REPORTED     RBC Morphology NOT REPORTED     Platelet Estimate NOT REPORTED     Immature Granulocytes 1 (H) 0 %    Seg Neutrophils 87 (H) 36 - 66 %    Lymphocytes 10 (L) 24 - 44 %    Monocytes 2 1 - 7 %    Eosinophils % 0 (L) 1 - 4 %    Basophils 0 0 - 2 %    Absolute Immature Granulocyte 0.10 0.00 - 0.30 k/uL    Segs Absolute 8.44 (H) 1.8 - 7.7 k/uL    Absolute Lymph # 0.97 (L) 1.0 - 4.8 k/uL    Absolute Mono # 0.19 0.1 - 0.8 k/uL    Absolute Eos # 0.00 0.0 - 0.4 k/uL    Basophils Absolute 0.00 0.0 - 0.2 k/uL    Morphology Normal    Basic Metabolic Panel w/ Reflex to MG   Result Value Ref Range    Glucose 175 (H) 70 - 99 mg/dL    BUN 6 6 - 20 mg/dL    CREATININE 0.86 0.70 - 1.20 mg/dL    Bun/Cre Ratio NOT REPORTED 9 - 20    Calcium 9.9 8.6 - 10.4 mg/dL    Sodium 135 135 - 144 mmol/L    Potassium 3.9 3.7 - 5.3 mmol/L    Chloride 98 98 - 107 mmol/L    CO2 15 (L) 20 - 31 mmol/L    Anion Gap 22 (H) 9 - 17 mmol/L    GFR Non-African American >60 >60 mL/min    GFR African American >60 >60 mL/min    GFR Comment          GFR Staging NOT REPORTED    CK   Result Value Ref Range    Total  39 - 308 U/L   MYOGLOBIN, SERUM   Result Value Ref Range    Myoglobin 383 (H) 28 - 72 ng/mL   TOX SCR, BLD, ED   Result Value Ref Range    Acetaminophen Level <5 (L) 10 - 30 ug/mL    Ethanol <10 <10 mg/dL    Ethanol percent <4.261 <3.715 %    Salicylate Lvl <1 (L) 3 - 10 mg/dL    Toxic Tricyclic Sc,Blood NEGATIVE NEGATIVE   POCT Glucose   Result Value Ref Range    Glucose 147 mg/dL    QC OK?  yes    POC Glucose Fingerstick   Result Value Ref Range    POC Glucose 147 (H) 75 - 110 mg/dL     CT HEAD WO CONTRAST    Result Date: 7/6/2021  EXAMINATION: CT OF THE HEAD WITHOUT CONTRAST  7/6/2021 3:55 am TECHNIQUE: CT of the head was performed without the administration of intravenous contrast. Dose modulation, iterative reconstruction, and/or weight based adjustment of the mA/kV was utilized to reduce the radiation dose to as low as reasonably achievable. COMPARISON: CT brain 07/21/2014. HISTORY: ORDERING SYSTEM PROVIDED HISTORY: Looking for Gamers TECHNOLOGIST PROVIDED HISTORY: Looking for Gamers Decision Support Exception - unselect if not a suspected or confirmed emergency medical condition->Emergency Medical Condition (MA) Reason for Exam: Seizures - Hx - Seizures. Acuity: Acute Type of Exam: Initial FINDINGS: BRAIN/VENTRICLES: There is no acute intracranial hemorrhage, mass effect or midline shift. No abnormal extra-axial fluid collection. The gray-white differentiation is maintained without evidence of an acute infarct. There is no evidence of hydrocephalus. ORBITS: The visualized portion of the orbits demonstrate no acute abnormality. SINUSES: The visualized paranasal sinuses and mastoid air cells demonstrate no acute abnormality. SOFT TISSUES/SKULL:  No acute abnormality of the visualized skull or soft tissues. No acute intracranial abnormality. Physical Exam:    General appearance - NAD, AOx 3, in no apparent distress  Lungs -CTAB, no R/R/R  Heart - RRR, no M/R/G  Abdomen - Soft, NT/ND  Neurological:  MAEx4, No focal motor deficit, sensory loss  Extremities - Cap refil <2 sec in all ext., no edema  Skin -warm, dry      Hospital Course:  Clinical course has improved, labs and imaging reviewed. Margaret Grant originally presented to the hospital on 7/6/2021  2:16 AM. with seizure. At that time it was determined that He required further observation and supportive care with Ativan and neurology consult. He was admitted and labs and imaging were followed daily. Imaging results as above. He is medically stable to be discharged. Patient advised to follow-up with psychiatrist soon as possible to develop a plan to taper off benzos. Patient also advised to follow-up with a psychologist for cognitive behavioral therapy.     Disposition: Home    Patient stated that they will not drive themselves home from the hospital if they have gotten pain killers/ narcotics earlier that day and that they will arrange for transportation on their own or work with the  for a ride. Patient counseled NOT to drive while under the influence of narcotics/ pain killers. Condition: Good    Patient stable and ready for discharge home. I have discussed plan of care with patient and they are in understanding. They were instructed to read discharge paperwork. All of their questions and concerns were addressed. Time Spent: 0 day      --  Estefania Parnell MD  Emergency Medicine Resident Physician    This dictation was generated by voice recognition computer software. Although all attempts are made to edit the dictation for accuracy, there may be errors in the transcription that are not intended.

## 2021-07-06 NOTE — ED NOTES
Patient resting comfortably on stretcher, in no apparent distress  Respirations even and non-labored  Patient has no needs at this time  Call light remains within reach     Nelida Halsted, RN  07/06/21 0956

## 2021-07-06 NOTE — CARE COORDINATION
Case Management Initial Discharge [address] [facility],             Met with:patient to discuss discharge plans. Information verified: address, contacts, phone number, , insurance Yes  Insurance Provider: [facility] access [facility]    Emergency Contact/Next of Kin name & number:   Pilar Duncan      Other  Parent [phone]       Who are involved in patient's support system? Rodolfo Yi    PCP: Rodolfo Yi will bring in name of pcp  Date of last visit: \"awhile\"      Discharge Planning    Living Arrangements:        Home has 1 stories  4 stairs to climb to get into front door, 0stairs to climb to reach second floor  Location of bedroom/bathroom in home main    Patient able to perform ADL's:Independent    Current Services (outpatient & in home) none  DME equipment: none  DME provider: none    Is patient receiving oral anticoagulation therapy? No    If indicated:   Physician managing anticoagulation treatment: n/a  Where does patient obtain lab work for ATC treatment? n/a      Potential Assistance Needed:       Patient agreeable to home care: No  [place] of choice provided:  n/a    Prior SNF/Rehab Placement and Facility: none  Agreeable to SNF/Rehab: No  [place] of choice provided: n/a     Evaluation: yes    Expected Discharge date:       Patient expects to be discharged to: If home: is the family and/or caregiver wiling & able to provide support at home? Yes   Who will be providing this support? Chandrika    Follow Up Appointment: Best Day/ Time:      Transportation provider: cab or walk  Transportation arrangements needed for discharge: Yes    Readmission Risk              Risk of Unplanned Readmission:  0             Does patient have a readmission risk score greater than 14?: n/a  If yes, follow-up appointment must be made within 7 days of discharge.      Goals of Care: to get well enough to go back to work      Educated pt on transitional options, provided freedom

## 2021-07-06 NOTE — ED PROVIDER NOTES
[address] HANDOFF       Handoff taken on the following patient from prior Attending Physician:  Pt Name: Rommel Sensor  PCP:  No primary care provider on file. Attestation  I was available and discussed any additional care issues that arose and coordinated the management plans with the resident(s) caring for the patient during my duty period. Any areas of disagreement with resident's documentation of care or procedures are noted on the chart. I was personally present for the key portions of any/all procedures during my duty period. I have documented in the chart those procedures where I was not present during the key portions. CHIEF COMPLAINT       Chief Complaint   Patient presents with    Seizures         CURRENT MEDICATIONS     Previous Medications  Previous Medications    ALPRAZOLAM (XANAX) 1 MG TABLET    Take 1 mg by mouth daily. DIPHENHYDRAMINE (BENADRYL) 25 MG CAPSULE    Take 1 capsule by mouth every 6 hours as needed for Itching for up to 30 doses. METHADONE 10 MG/5ML SOLUTION    160 mg. Encounter Medications  Orders Placed This Encounter   Medications    DISCONTD: LORazepam (ATIVAN) injection 2 mg    LORazepam (ATIVAN) injection 2 mg    lactated ringers bolus       ALLERGIES     is allergic to hydrocodone. RECENT VITALS:   Temp: 99 °F (37.2 °C),  Pulse: 79, Resp: 20, BP: (!) 104/59    RADIOLOGY:   CT HEAD WO CONTRAST   Final Result   No acute intracranial abnormality.              LABS:  Labs Reviewed   CBC WITH AUTO DIFFERENTIAL - Abnormal; Notable for the following components:       Result Value    Immature Granulocytes 1 (*)     Seg Neutrophils 87 (*)     Lymphocytes 10 (*)     Eosinophils % 0 (*)     Segs Absolute 8.44 (*)     Absolute Lymph # 0.97 (*)     All other components within normal limits   BASIC METABOLIC PANEL W/ REFLEX TO MG FOR LOW K - Abnormal; Notable for the following components:    Glucose 175 (*)     CO2 15 (*) Anion Gap 22 (*)     All other components within normal limits   MYOGLOBIN, SERUM - Abnormal; Notable for the following components:    Myoglobin 383 (*)     All other components within normal limits   TOX SCR, BLD, ED - Abnormal; Notable for the following components:    Acetaminophen Level <5 (*)     Salicylate Lvl <1 (*)     All other components within normal limits   POC GLUCOSE FINGERSTICK - Abnormal; Notable for the following components:    POC Glucose 147 (*)     All other components within normal limits   POCT GLUCOSE - Normal   CK   URINE DRUG SCREEN           PLAN/ TASKS OUTSTANDING           (Please note that portions of this note were completed with a voice recognition program.  Efforts were made to edit the dictations but occasionally words are mis-transcribed.)    Paula Harding MD,, MD, F.A.C.E.P.   Attending Emergency Physician       Paula Harding MD  07/06/21 9057

## 2021-07-06 NOTE — ED PROVIDER NOTES
[facility] ED  Emergency Department Encounter  EmergencyMedicine Resident     Pt Name:Kole Santiago  MRN: [id number]  [place] 1985  Date of evaluation: 7/6/21  PCP:  No primary care provider on file. This patient was evaluated in the Emergency Department for symptoms described in the history of present illness. The patient was evaluated in the context of the global COVID-19 pandemic, which necessitated consideration that the patient might be at risk for infection with the SARS-CoV-2 virus that causes COVID-19. Institutional protocols and algorithms that pertain to the evaluation of patients at risk for COVID-19 are in a state of rapid change based on information released by regulatory bodies including the CDC and federal and state organizations. These policies and algorithms were followed during the patient's care in the ED. CHIEF COMPLAINT       Chief Complaint   Patient presents with    Seizures       HISTORY OF PRESENT ILLNESS  (Location/Symptom, Timing/Onset, Context/Setting, Quality, Duration, Modifying Factors, Severity.)      Edwardo Bansal is a 36y , Patient presented to ED via EMS, patient states that his fiancée called EMS due to witnessed seizure. Patient has been on 1 mg of Xanax for the past 3 months prescribed by primary care provider. He has run out of the medication about 3-4 days ago and has sustained a witnessed seizure. Patient presented today with altered mental status however it has improved and patient was mentating appropriately however he was unable to open his eyes on exam.  Pupils were equal and round reactive to light. He did not have acute chest pain or shortness of breath. Patient was given 2 mg of Ativan IM, last seizure was in 2014. He has not had a seizure since then. Due to new onset of symptoms and potential loss of consciousness CT head ordered for patient to evaluate for new intracranial abnormalities.       PAST MEDICAL / SURGICAL / Provider, MD   diphenhydrAMINE (BENADRYL) 25 MG capsule Take 1 capsule by mouth every 6 hours as needed for Itching for up to 30 doses. 10/9/14   Pritesh Gaona PA-C   ALPRAZolam Price Holt) 1 MG tablet Take 1 mg by mouth daily. Historical Provider, MD       REVIEW OF SYSTEMS    (2-9 systems for level 4, 10 or more for level 5)      Review of Systems   Constitutional: Positive for chills. Negative for activity change. HENT: Negative for congestion, ear pain, facial swelling and rhinorrhea. Respiratory: Negative for apnea, shortness of breath, wheezing and stridor. Cardiovascular: Negative for chest pain. Gastrointestinal: Negative for abdominal distention and abdominal pain. Neurological: Positive for tremors, seizures and weakness. Negative for syncope, facial asymmetry, speech difficulty, numbness and headaches. Psychiatric/Behavioral: The patient is not nervous/anxious. PHYSICAL EXAM   (up to 7 for level 4, 8 or more for level 5)      INITIAL VITALS:   BP (!) 104/59   Pulse 79   Temp 99 °F (37.2 °C) (Oral)   Resp 20   Ht 6' 1\" (1.854 m)   Wt 150 lb (68 kg)   SpO2 95%   BMI 19.79 kg/m²     Physical Exam  Constitutional:       General: He is not in acute distress. Appearance: He is not ill-appearing. HENT:      Head: Normocephalic and atraumatic. Mouth/Throat:      Mouth: Mucous membranes are moist.   Cardiovascular:      Rate and Rhythm: Normal rate and regular rhythm. Pulses: Normal pulses. Heart sounds: Normal heart sounds. Pulmonary:      Effort: Pulmonary effort is normal.      Breath sounds: Normal breath sounds. Abdominal:      General: Abdomen is flat. Palpations: Abdomen is soft. Musculoskeletal:      Cervical back: Normal range of motion. Skin:     General: Skin is warm. Neurological:      General: No focal deficit present. GCS: GCS eye subscore is 4. GCS verbal subscore is 5. GCS motor subscore is 6.       Motor: No weakness, atrophy, seizure activity or pronator drift. Comments: Patient was responding to questions appropriately and following commands, he appeared possibly post-ictal however unable to be 100% sure. GCS 15, and otherwise non-focal         DIFFERENTIAL  DIAGNOSIS     PLAN (LABS / IMAGING / EKG):  Orders Placed This Encounter   Procedures    CT HEAD WO CONTRAST    CBC Auto Differential    Basic Metabolic Panel w/ Reflex to MG    CK    MYOGLOBIN, SERUM    TOX SCR, BLD, ED    DRUG SCREEN MULTI URINE    Inpatient consult to Neurology    POCT Glucose    POC Glucose Fingerstick    EKG 12 Lead    PATIENT STATUS (FROM ED OR OR/PROCEDURAL) Observation       MEDICATIONS ORDERED:  Orders Placed This Encounter   Medications    DISCONTD: LORazepam (ATIVAN) injection 2 mg    LORazepam (ATIVAN) injection 2 mg    lactated ringers bolus       DDX: Onset seizure, benzodiazepine withdrawal seizure, alcohol withdrawal seizure, drug intoxication, hypoglycemia, encephalopathy.     DIAGNOSTIC RESULTS / EMERGENCY DEPARTMENT COURSE / MDM   LAB RESULTS:  Results for orders placed or performed during the hospital encounter of 07/06/21   CBC Auto Differential   Result Value Ref Range    WBC 9.7 3.5 - 11.3 k/uL    RBC 4.73 4.21 - 5.77 m/uL    Hemoglobin 13.6 13.0 - 17.0 g/dL    Hematocrit 41.6 40.7 - 50.3 %    MCV 87.9 82.6 - 102.9 fL    MCH 28.8 25.2 - 33.5 pg    MCHC 32.7 28.4 - 34.8 g/dL    RDW 12.5 11.8 - 14.4 %    Platelets 776 154 - 575 k/uL    MPV 10.7 8.1 - 13.5 fL    NRBC Automated 0.0 0.0 per 100 WBC    Differential Type NOT REPORTED     WBC Morphology NOT REPORTED     RBC Morphology NOT REPORTED     Platelet Estimate NOT REPORTED     Immature Granulocytes 1 (H) 0 %    Seg Neutrophils 87 (H) 36 - 66 %    Lymphocytes 10 (L) 24 - 44 %    Monocytes 2 1 - 7 %    Eosinophils % 0 (L) 1 - 4 %    Basophils 0 0 - 2 %    Absolute Immature Granulocyte 0.10 0.00 - 0.30 k/uL    Segs Absolute 8.44 (H) 1.8 - 7.7 k/uL    Absolute Lymph # 0.97 (L) 1.0 - 4.8 k/uL    Absolute Mono # 0.19 0.1 - 0.8 k/uL    Absolute Eos # 0.00 0.0 - 0.4 k/uL    Basophils Absolute 0.00 0.0 - 0.2 k/uL    Morphology Normal    Basic Metabolic Panel w/ Reflex to MG   Result Value Ref Range    Glucose 175 (H) 70 - 99 mg/dL    BUN 6 6 - 20 mg/dL    CREATININE 0.86 0.70 - 1.20 mg/dL    Bun/Cre Ratio NOT REPORTED 9 - 20    Calcium 9.9 8.6 - 10.4 mg/dL    Sodium 135 135 - 144 mmol/L    Potassium 3.9 3.7 - 5.3 mmol/L    Chloride 98 98 - 107 mmol/L    CO2 15 (L) 20 - 31 mmol/L    Anion Gap 22 (H) 9 - 17 mmol/L    GFR Non-African American >60 >60 mL/min    GFR African American >60 >60 mL/min    GFR Comment          GFR Staging NOT REPORTED    CK   Result Value Ref Range    Total  39 - 308 U/L   MYOGLOBIN, SERUM   Result Value Ref Range    Myoglobin 383 (H) 28 - 72 ng/mL   TOX SCR, BLD, ED   Result Value Ref Range    Acetaminophen Level <5 (L) 10 - 30 ug/mL    Ethanol <10 <10 mg/dL    Ethanol percent <1.116 <1.421 %    Salicylate Lvl <1 (L) 3 - 10 mg/dL    Toxic Tricyclic Sc,Blood NEGATIVE NEGATIVE   POCT Glucose   Result Value Ref Range    Glucose 147 mg/dL    QC OK? yes    POC Glucose Fingerstick   Result Value Ref Range    POC Glucose 147 (H) 75 - 110 mg/dL       IMPRESSION: Acute benzodiazepine withdrawal most likely in the setting due to patient's recent benzodiazepine use and abrupt cessation. It is likely that the patient took more than prescribed therefore he ran out sooner than intended. Patient CT was negative for intracranial abnormalities. Patient was given 2 mg IV Ativan in the ED. His exam remained nonfocal he did not have any deficits or weakness. Patient improved clinically and has remained afebrile hemodynamically stable. No concerns at this time. Patient will be admitted to hobs unit per neurology recommendations and given Xanax as needed to prevent withdrawal symptoms.     RADIOLOGY:  CT HEAD WO CONTRAST    Result Date: 7/6/2021  No acute intracranial abnormality. EMERGENCY DEPARTMENT COURSE:  ED Course as of Jul 06 0724   Tue Jul 06, 2021   0355 Patient presented to ED via EMS, patient states that his fiancée called EMS due to witnessed seizure. Patient has been on 1 mg of Xanax for the past 3 months prescribed by primary care provider. He has run out of the medication about 3 days ago and has sustained a witnessed seizure. Patient presented today with altered mental status however it has improved and patient was mentating appropriately however he was unable to open his eyes on exam.  Pupils were equal and round reactive to light. He did not have acute chest pain or shortness of breath. Patient was given 2 mg of Ativan IM, last seizure was in 2014. He has not had a seizure since then. Due to new onset of symptoms and potential loss of consciousness CT head ordered for patient to evaluate for new intracranial abnormalities. Kain Isabelnaeem   9962 Neurology consulted for patient, neurology will evaluate    [KK]   0530 TOX SCR, BLD, ED(!):    Acetaminophen Level <5(!)   Ethanol <10   Ethanol percent <8.411   Salicylate Lvl <1(!)   Toxic Tricyclic Sc,Blood NEGATIVE [KK]      ED Course User Index  301 Vicente Gupta DO         CONSULTS:  IP CONSULT TO NEUROLOGY        FINAL IMPRESSION      Benzodiazepine withdrawal    DISPOSITION / [address] Admitted 07/06/2021 06:50:35 AM      PATIENT REFERRED TO:  No follow-up provider specified.     DISCHARGE MEDICATIONS:  New Prescriptions    No medications on file       Karma Gutierrez DO  Emergency Medicine Resident    (Please note that portions of thisnote were completed with a voice recognition program.  Efforts were made to edit the dictations but occasionally words are mis-transcribed.)        Adriano Alves DO  Resident  07/06/21 2000 Adriana Schultz DO  Resident  07/06/21 2000 Adriana Schultz DO  Resident  07/06/21 2000 Adriana Schultz DO  Resident  07/25/21 1101

## 2021-07-06 NOTE — ED NOTES
Patient resting comfortably on stretcher, in no apparent distress  Respirations even and non-labored  Patient has no needs at this time  Call light remains within reach     Jo Ann Carter RN  07/06/21 5080

## 2021-07-06 NOTE — ED NOTES
Patient to ED via EMS taken to room 16  Patient here with complaint of 3x seizures today  EMS states that patient had three seizures PTA   EMS also states that patient has a hx of drug use and is on methadone currently  Patient states he stopped taking his anti-seizure medication but is unable to say why; states its been three days since his last dose  Patient arrives to ED post-ictal and groggy but otherwise answering all questions and following all commands appropriately  Denies any CP, SOB, N/V  Seizure precautions placed    Patient resting comfortably on stretcher, in no apparent distress  Respirations even and non-labored  Patient has no needs at this time  Call light remains within reach     Khloe Whitten RN  07/06/21 1585

## 2021-07-06 NOTE — ED NOTES
Bed: 16  Expected date:   Expected time:   Means of arrival:   Comments:  240 Enzo Welsh, RN  07/06/21 1268

## 2021-07-06 NOTE — PROGRESS NOTES
[address]  CDU / OBSERVATION ENCOUNTER  ATTENDING NOTE       I performed a history and physical examination of the patient and discussed management with the resident or midlevel provider. I reviewed the resident or midlevel provider's note and agree with the documented findings and plan of care. Any areas of disagreement are noted on the chart. I was personally present for the key portions of any procedures. I have documented in the chart those procedures where I was not present during the key portions. I have reviewed the nurses notes. I agree with the chief complaint, past medical history, past surgical history, allergies, medications, social and family history as documented unless otherwise noted below. The Family history, social history, and ROS are effectively unchanged since admission unless noted elsewhere in the chart. Patient admitted to the ETU for benzodiazepine withdrawal and seizure. Patient says he has been taking Xanax for anxiety. He says that he had just been taking 1 Xanax daily to help him deal with his anxiety but over the past couple of weeks has needed to increase that to 2 daily which caused him to run out of his Xanax on Friday. Patient says that he was out of his Xanax over the weekend and then had a seizure yesterday. Patient says he has had seizures due to withdrawal from benzodiazepines in the past.  He does not have a seizure disorder. On my exam, patient is alert and oriented and answering questions appropriately. Lungs are clear to auscultation bilaterally and heart sounds are normal.  Abdomen is soft and nontender. Strength and sensation is intact to all extremities. Neurology was consulted from the emergency department and they recommended resuming Xanax 1 mg daily and to admit to observation for withdrawal symptoms. They are considering EEG or MRI. Patient is also on methadone which has been ordered for him for this morning.   Will observe patient and await any further neurology recommendations.     Bry Awan MD  Attending Emergency  Physician

## 2021-07-06 NOTE — H&P
[facility]  CDU / OBSERVATION eNCOUnter  Resident Note     Pt Name: Margaret Grant  MRN: [id number]  [place] 1985  Date of evaluation: 7/6/21  Patient's PCP is : No primary care provider on file. CHIEF COMPLAINT       Chief Complaint   Patient presents with    Seizures         HISTORY OF PRESENT ILLNESS    Margaret Grant is a 29 y.o. male who presented to the ED via EMS for seizure. Patient states he has been on benzodiazepines for the past year off and on. He says that due to recent life difficulties for the past 3 months he has been taking Xanax 1 mg much more frequently. Patient sees a psychiatrist for generalized anxiety, which is who prescribes Xanax for him. Patient was prescribed 15 Xanax on June 7 of this year, and 27 Xanax on [address], and states that he has ran out. In the ED patient given 2 mg of Ativan IM for management of benzodiazepine withdrawal.  Patient admitted to obs unit while awaiting neurology consult. On exam this morning. Patient was fully alert and oriented and in no apparent distress. He has not had a seizure since presenting to ED. He is denying any chest pain shortness of breath fevers chills, palpitations. He has no complaints at this time other than just feeling tired.     Location/Symptom: N/A  Timing/Onset: n/a  Provocation: n/a  Quality: n/a  Radiation: n/a  Severity: n/a  Timing/Duration: n/a  Modifying Factors: n/a    REVIEW OF SYSTEMS       General ROS - No fevers, No malaise   Ophthalmic ROS - No discharge, No changes in vision  ENT ROS -  No sore throat, No rhinorrhea,   Respiratory ROS - no shortness of breath, no cough, no  wheezing  Cardiovascular ROS - No chest pain, no dyspnea on exertion  Gastrointestinal ROS - No abdominal pain, no nausea or vomiting, no change in bowel habits, no black or bloody stools  Genito-Urinary ROS - No dysuria, trouble voiding, or hematuria  Musculoskeletal ROS - No myalgias, No arthalgias  Neurological ROS - No headache, no dizziness/lightheadedness, No focal weakness, no loss of sensation  Dermatological ROS - No lesions, No rash     (PQRS) Advance directives on face sheet per hospital policy. No change unless specifically mentioned in chart    PAST MEDICAL HISTORY    has a past medical history of Asthma, MRSA (methicillin resistant staph aureus) culture positive, Seizures ([address] and Viral hepatitis C. I have reviewed the past medical history with the patient and it is pertinent to this complaint. SURGICAL HISTORY      has no past surgical history on file. I have reviewed and agree with Surgical History entered and it is pertinent to this complaint. CURRENT MEDICATIONS     ondansetron (ZOFRAN-ODT) disintegrating tablet 4 mg, Q8H PRN   Or  ondansetron (ZOFRAN) injection 4 mg, Q6H PRN  methadone 5 MG/5ML solution 205 mg, Daily  sodium chloride flush 0.9 % injection 5-40 mL, 2 times per day  sodium chloride flush 0.9 % injection 5-40 mL, PRN  0.9 % sodium chloride infusion, PRN  enoxaparin (LOVENOX) injection 40 mg, Daily  polyethylene glycol (GLYCOLAX) packet 17 g, Daily PRN  acetaminophen (TYLENOL) tablet 650 mg, Q6H PRN   Or  acetaminophen (TYLENOL) suppository 650 mg, Q6H PRN  LORazepam (ATIVAN) tablet 1 mg, Q1H PRN   Or  LORazepam (ATIVAN) injection 1 mg, Q1H PRN   Or  LORazepam (ATIVAN) tablet 2 mg, Q1H PRN   Or  LORazepam (ATIVAN) injection 2 mg, Q1H PRN   Or  LORazepam (ATIVAN) tablet 3 mg, Q1H PRN   Or  LORazepam (ATIVAN) injection 3 mg, Q1H PRN   Or  LORazepam (ATIVAN) tablet 4 mg, Q1H PRN   Or  LORazepam (ATIVAN) injection 4 mg, Q1H PRN  clonazePAM (KLONOPIN) tablet 1 mg, BID PRN        All medication charted and reviewed. ALLERGIES     is allergic to hydrocodone. FAMILY HISTORY     has no family status information on file. family history is not on file. The patient denies any pertinent family history.   I have reviewed and agree with the family history entered. I have reviewed the Family History and it is not significant to the case    SOCIAL HISTORY      reports that he has been smoking. He has never used smokeless tobacco. He reports that he does not drink alcohol and does not use drugs. I have reviewed and agree with all Social.  Patient admits to heavy benzodiazepine use. PHYSICAL EXAM     INITIAL VITALS:  height is 6' 1\" (1.854 m) and weight is 150 lb (68 kg). His oral temperature is 98.2 °F (36.8 °C). His blood pressure is 126/68 and his pulse is 77. His respiration is 18 and oxygen saturation is 98%. CONSTITUTIONAL: AOx4, no apparent distress, appears stated age    HEAD: normocephalic, atraumatic   EYES: PERRLA, EOMI    ENT: moist mucous membranes, uvula midline   NECK: supple, symmetric   BACK: symmetric   LUNGS: clear to auscultation bilaterally   CARDIOVASCULAR: regular rate and rhythm, no murmurs, rubs or gallops   ABDOMEN: soft, non-tender, non-distended with normal active bowel sounds   NEUROLOGIC:  MAEx4, no focal sensory or motor deficits   MUSCULOSKELETAL: no clubbing, cyanosis or edema   SKIN: no rash or wounds       DIFFERENTIAL DIAGNOSIS/MDM:   AMS:  DDX: Evaluate for ingestion, infectious, trauma, seizure, AMS, electrolytes, encephalopathy, insulin, opiates, uremia, toxins, tumor, thyrotoxicosis, psychiatric, sepsis and stroke. DIAGNOSTIC RESULTS       RADIOLOGY:   I directly visualized the following  images and reviewed the radiologist interpretations:    CT HEAD WO CONTRAST    Result Date: 7/6/2021  EXAMINATION: CT OF THE HEAD WITHOUT CONTRAST  7/6/2021 3:55 am TECHNIQUE: CT of the head was performed without the administration of intravenous contrast. Dose modulation, iterative reconstruction, and/or weight based adjustment of the mA/kV was utilized to reduce the radiation dose to as low as reasonably achievable. COMPARISON: CT brain 07/21/2014.  HISTORY: ORDERING SYSTEM PROVIDED HISTORY: [facility] PROVIDED HISTORY: seuzure Decision Support Exception - unselect if not a suspected or confirmed emergency medical condition->Emergency Medical Condition (MA) Reason for Exam: Seizures - Hx - Seizures. Acuity: Acute Type of Exam: Initial FINDINGS: BRAIN/VENTRICLES: There is no acute intracranial hemorrhage, mass effect or midline shift. No abnormal extra-axial fluid collection. The gray-white differentiation is maintained without evidence of an acute infarct. There is no evidence of hydrocephalus. ORBITS: The visualized portion of the orbits demonstrate no acute abnormality. SINUSES: The visualized paranasal sinuses and mastoid air cells demonstrate no acute abnormality. SOFT TISSUES/SKULL:  No acute abnormality of the visualized skull or soft tissues. No acute intracranial abnormality. LABS:  I have reviewed and interpreted all available lab results.   Labs Reviewed   CBC WITH AUTO DIFFERENTIAL - Abnormal; Notable for the following components:       Result Value    Immature Granulocytes 1 (*)     Seg Neutrophils 87 (*)     Lymphocytes 10 (*)     Eosinophils % 0 (*)     Segs Absolute 8.44 (*)     Absolute Lymph # 0.97 (*)     All other components within normal limits   BASIC METABOLIC PANEL W/ REFLEX TO MG FOR LOW K - Abnormal; Notable for the following components:    Glucose 175 (*)     CO2 15 (*)     Anion Gap 22 (*)     All other components within normal limits   MYOGLOBIN, SERUM - Abnormal; Notable for the following components:    Myoglobin 383 (*)     All other components within normal limits   TOX SCR, BLD, ED - Abnormal; Notable for the following components:    Acetaminophen Level <5 (*)     Salicylate Lvl <1 (*)     All other components within normal limits   POC GLUCOSE FINGERSTICK - Abnormal; Notable for the following components:    POC Glucose 147 (*)     All other components within normal limits   POCT GLUCOSE - Normal   CK   URINE DRUG SCREEN       SCREENING TOOLS:    HEART Risk Score for Chest Pain generated by voice recognition computer software. Although all attempts are made to edit the dictation for accuracy, there may be errors in the transcription that are not intended.

## 2021-07-06 NOTE — ED PROVIDER NOTES
[facility]     Emergency Department     Faculty Attestation    I performed a history and physical examination of the patient and discussed management with the resident. I have reviewed and agree with the residents findings including all diagnostic interpretations, and treatment plans as written. Any areas of disagreement are noted on the chart. I was personally present for the key portions of any procedures. I have documented in the chart those procedures where I was not present during the key portions. I have reviewed the emergency nurses triage note. I agree with the chief complaint, past medical history, past surgical history, allergies, medications, social and family history as documented unless otherwise noted below. Documentation of the HPI, Physical Exam and Medical Decision Making performed by [email or web address] is based on my personal performance of the HPI, PE and MDM. For Physician Assistant/ Nurse Practitioner cases/documentation I have personally evaluated this patient and have completed at least one if not all key elements of the E/M (history, physical exam, and MDM). Additional findings are as noted. 30 yo M grand mal seizure tonight, no injury, no fever, no vomit, no incontinence, pt on xanax from pcp, past heroin use, on methadone,   pe airway intact, vss affect flat, scar, no cervical tenderness or crepitus, chest non tender, abdomen non tender, no distension, no rigidity, extremities atraumatic,     Ct -, eval stable, neuro consulted     EKG Interpretation    Interpreted by me  Sinus, heart rate 78, no ischemia, normal axis, QT corrected 449    CRITICAL CARE: There was a high probability of clinically significant/life threatening deterioration in this patient's condition which required my urgent intervention. Total critical care time was 5 minutes. This excludes any time for separately reportable procedures.        Corinne [address] [facility], DO  07/06/21 1202 [facility], DO  07/06/21 1200 [facility], DO  07/06/21 1051

## 2021-07-06 NOTE — CARE COORDINATION
Consult received for consideration of rehab  Attempted to meet with pt, however, he has already been discharged

## 2021-07-07 LAB
EKG ATRIAL RATE: 78 BPM
EKG P AXIS: 68 DEGREES
EKG P-R INTERVAL: 148 MS
EKG Q-T INTERVAL: 394 MS
EKG QRS DURATION: 92 MS
EKG QTC CALCULATION (BAZETT): 449 MS
EKG R AXIS: 85 DEGREES
EKG T AXIS: 74 DEGREES
EKG VENTRICULAR RATE: 78 BPM

## 2021-07-22 NOTE — DISCHARGE SUMMARY
CDU Discharge Summary        Patient:  Daniella Oviedo  YOB: 1985    MRN: [id number]   Acct: [de-identified]    Primary Care Physician: No primary care provider on file. Admit date:  7/6/2021  2:16 AM  Discharge date: 7/6/2021    Discharge Diagnoses:     Acute seizure due to benzodiazepine withdrawal  Improved with Ativan. Follow-up:  Call today/tomorrow for a follow up appointment with your psychiatrist, or return to the Emergency Room with worsening symptoms    Stressed to patient the importance of following up with primary care doctor for further workup/management of symptoms. Pt verbalizes understanding and agrees with plan. Discharge Medications:  Changes to medications: We will discharge home on clonazepam        Melva Olivo   Home Medication Instructions [facility]:[phone]    Printed on:07/22/21 3446   Medication Information                      ALPRAZolam (XANAX) 1 MG tablet  Take 1 mg by mouth daily. clonazePAM (KLONOPIN) 1 MG tablet  Take 1 tablet by mouth 2 times daily as needed for Anxiety for up to 30 days. diphenhydrAMINE (BENADRYL) 25 MG capsule  Take 1 capsule by mouth every 6 hours as needed for Itching for up to 30 doses. methadone 10 MG/5ML solution  205 mg.                   Diet:  No diet orders on file , Advance as tolerated     Activity:  As tolerated    Consultants: IP CONSULT TO NEUROLOGY  IP CONSULT TO SOCIAL WORK    Procedures:  Not indicated     Diagnostic Test:   Results for orders placed or performed during the hospital encounter of 07/06/21   CBC Auto Differential   Result Value Ref Range    WBC 9.7 3.5 - 11.3 k/uL    RBC 4.73 4.21 - 5.77 m/uL    Hemoglobin 13.6 13.0 - 17.0 g/dL    Hematocrit 41.6 40.7 - 50.3 %    MCV 87.9 82.6 - 102.9 fL    MCH 28.8 25.2 - 33.5 pg    MCHC 32.7 28.4 - 34.8 g/dL    RDW 12.5 11.8 - 14.4 %    Platelets 946 962 - 668 k/uL    MPV 10.7 8.1 - 13.5 fL    NRBC Automated 0.0 0.0 per 100 WBC Differential Type NOT REPORTED     WBC Morphology NOT REPORTED     RBC Morphology NOT REPORTED     Platelet Estimate NOT REPORTED     Immature Granulocytes 1 (H) 0 %    Seg Neutrophils 87 (H) 36 - 66 %    Lymphocytes 10 (L) 24 - 44 %    Monocytes 2 1 - 7 %    Eosinophils % 0 (L) 1 - 4 %    Basophils 0 0 - 2 %    Absolute Immature Granulocyte 0.10 0.00 - 0.30 k/uL    Segs Absolute 8.44 (H) 1.8 - 7.7 k/uL    Absolute Lymph # 0.97 (L) 1.0 - 4.8 k/uL    Absolute Mono # 0.19 0.1 - 0.8 k/uL    Absolute Eos # 0.00 0.0 - 0.4 k/uL    Basophils Absolute 0.00 0.0 - 0.2 k/uL    Morphology Normal    Basic Metabolic Panel w/ Reflex to MG   Result Value Ref Range    Glucose 175 (H) 70 - 99 mg/dL    BUN 6 6 - 20 mg/dL    CREATININE 0.86 0.70 - 1.20 mg/dL    Bun/Cre Ratio NOT REPORTED 9 - 20    Calcium 9.9 8.6 - 10.4 mg/dL    Sodium 135 135 - 144 mmol/L    Potassium 3.9 3.7 - 5.3 mmol/L    Chloride 98 98 - 107 mmol/L    CO2 15 (L) 20 - 31 mmol/L    Anion Gap 22 (H) 9 - 17 mmol/L    GFR Non-African American >60 >60 mL/min    GFR African American >60 >60 mL/min    GFR Comment          GFR Staging NOT REPORTED    CK   Result Value Ref Range    Total  39 - 308 U/L   MYOGLOBIN, SERUM   Result Value Ref Range    Myoglobin 383 (H) 28 - 72 ng/mL   TOX SCR, BLD, ED   Result Value Ref Range    Acetaminophen Level <5 (L) 10 - 30 ug/mL    Ethanol <10 <10 mg/dL    Ethanol percent <5.884 <1.637 %    Salicylate Lvl <1 (L) 3 - 10 mg/dL    Toxic Tricyclic Sc,Blood NEGATIVE NEGATIVE   POCT Glucose   Result Value Ref Range    Glucose 147 mg/dL    QC OK?  yes    POC Glucose Fingerstick   Result Value Ref Range    POC Glucose 147 (H) 75 - 110 mg/dL   EKG 12 Lead   Result Value Ref Range    Ventricular Rate 78 BPM    Atrial Rate 78 BPM    P-R Interval 148 ms    QRS Duration 92 ms    Q-T Interval 394 ms    QTc Calculation (Bazett) 449 ms    P Axis 68 degrees    R Axis 85 degrees    T Axis 74 degrees     CT HEAD WO CONTRAST    Result Date: Patient advised to follow-up with psychiatrist soon as possible to develop a plan to taper off benzos. Patient also advised to follow-up with a psychologist for cognitive behavioral therapy. Disposition: Home    Patient stated that they will not drive themselves home from the hospital if they have gotten pain killers/ narcotics earlier that day and that they will arrange for transportation on their own or work with the  for a ride. Patient counseled NOT to drive while under the influence of narcotics/ pain killers. Condition: Good    Patient stable and ready for discharge home. I have discussed plan of care with patient and they are in understanding. They were instructed to read discharge paperwork. All of their questions and concerns were addressed. Time Spent: 0 day      --  Adam Russo MD  Emergency Medicine Resident Physician    This dictation was generated by voice recognition computer software. Although all attempts are made to edit the dictation for accuracy, there may be errors in the transcription that are not intended.

## 2021-07-23 ENCOUNTER — HOSPITAL ENCOUNTER (EMERGENCY)
Age: 36
Discharge: HOME | End: 2021-07-23
Attending: EMERGENCY MEDICINE
Payer: COMMERCIAL

## 2021-07-23 VITALS
TEMPERATURE: 97.1 F | SYSTOLIC BLOOD PRESSURE: 135 MMHG | OXYGEN SATURATION: 99 % | HEART RATE: 69 BPM | DIASTOLIC BLOOD PRESSURE: 82 MMHG | RESPIRATION RATE: 16 BRPM

## 2021-07-23 DIAGNOSIS — G43.809 OTHER MIGRAINE WITHOUT STATUS MIGRAINOSUS, NOT INTRACTABLE: Primary | ICD-10-CM

## 2021-07-23 DIAGNOSIS — F13.930 BENZODIAZEPINE WITHDRAWAL WITHOUT COMPLICATION (HCC): ICD-10-CM

## 2021-07-23 PROCEDURE — 99282 EMERGENCY DEPT VISIT SF MDM: CPT

## 2021-07-23 PROCEDURE — 6360000002 HC RX W HCPCS: Performed by: STUDENT IN AN ORGANIZED HEALTH CARE EDUCATION/TRAINING PROGRAM

## 2021-07-23 PROCEDURE — 2580000003 HC RX 258: Performed by: STUDENT IN AN ORGANIZED HEALTH CARE EDUCATION/TRAINING PROGRAM

## 2021-07-23 PROCEDURE — 6370000000 HC RX 637 (ALT 250 FOR IP): Performed by: STUDENT IN AN ORGANIZED HEALTH CARE EDUCATION/TRAINING PROGRAM

## 2021-07-23 PROCEDURE — 96375 TX/PRO/DX INJ NEW DRUG ADDON: CPT

## 2021-07-23 PROCEDURE — 96374 THER/PROPH/DIAG INJ IV PUSH: CPT

## 2021-07-23 RX ORDER — 0.9 % SODIUM CHLORIDE 0.9 %
1000 INTRAVENOUS SOLUTION INTRAVENOUS ONCE
Status: COMPLETED | OUTPATIENT
Start: 2021-07-23 | End: 2021-07-23

## 2021-07-23 RX ORDER — PROCHLORPERAZINE EDISYLATE 5 MG/ML
10 INJECTION INTRAMUSCULAR; INTRAVENOUS ONCE
Status: COMPLETED | OUTPATIENT
Start: 2021-07-23 | End: 2021-07-23

## 2021-07-23 RX ORDER — CLONAZEPAM 1 MG/1
1 TABLET ORAL 2 TIMES DAILY PRN
Qty: 14 TABLET | Refills: 0 | Status: SHIPPED | OUTPATIENT
Start: 2021-07-23 | End: 2022-02-27 | Stop reason: SDUPTHER

## 2021-07-23 RX ORDER — DIPHENHYDRAMINE HYDROCHLORIDE 50 MG/ML
50 INJECTION INTRAMUSCULAR; INTRAVENOUS ONCE
Status: COMPLETED | OUTPATIENT
Start: 2021-07-23 | End: 2021-07-23

## 2021-07-23 RX ORDER — CLONAZEPAM 1 MG/1
1 TABLET ORAL ONCE
Status: COMPLETED | OUTPATIENT
Start: 2021-07-23 | End: 2021-07-23

## 2021-07-23 RX ADMIN — SODIUM CHLORIDE 1000 ML: 9 INJECTION, SOLUTION INTRAVENOUS at 16:26

## 2021-07-23 RX ADMIN — CLONAZEPAM 1 MG: 1 TABLET ORAL at 16:26

## 2021-07-23 RX ADMIN — DIPHENHYDRAMINE HYDROCHLORIDE 50 MG: 50 INJECTION, SOLUTION INTRAMUSCULAR; INTRAVENOUS at 16:26

## 2021-07-23 RX ADMIN — PROCHLORPERAZINE EDISYLATE 10 MG: 5 INJECTION INTRAMUSCULAR; INTRAVENOUS at 16:26

## 2021-07-23 ASSESSMENT — ENCOUNTER SYMPTOMS
SORE THROAT: 0
VOMITING: 0
DIARRHEA: 0
BACK PAIN: 0
RHINORRHEA: 0
TROUBLE SWALLOWING: 0
SHORTNESS OF BREATH: 0
NAUSEA: 0
ABDOMINAL DISTENTION: 0
COUGH: 0
WHEEZING: 0
CHEST TIGHTNESS: 0
CONSTIPATION: 0
ABDOMINAL PAIN: 0

## 2021-07-23 ASSESSMENT — PAIN DESCRIPTION - FREQUENCY: FREQUENCY: CONTINUOUS

## 2021-07-23 ASSESSMENT — PAIN DESCRIPTION - ONSET: ONSET: ON-GOING

## 2021-07-23 ASSESSMENT — PAIN DESCRIPTION - LOCATION: LOCATION: HEAD

## 2021-07-23 ASSESSMENT — PAIN SCALES - GENERAL: PAINLEVEL_OUTOF10: 10

## 2021-07-23 ASSESSMENT — PAIN DESCRIPTION - PROGRESSION: CLINICAL_PROGRESSION: GRADUALLY WORSENING

## 2021-07-23 ASSESSMENT — PAIN DESCRIPTION - PAIN TYPE: TYPE: ACUTE PAIN

## 2021-07-23 ASSESSMENT — PAIN DESCRIPTION - DESCRIPTORS: DESCRIPTORS: ACHING;THROBBING

## 2021-07-23 NOTE — ED PROVIDER NOTES
[facility] ED  Emergency Department Encounter  Emergency Medicine Resident     Pt Name: Thalia Sawyer  MRN: [id number]  Davegfbret 1985  Date of evaluation: 7/23/21  PCP:  No primary care provider on file. CHIEF COMPLAINT       Chief Complaint   Patient presents with    Migraine     ran out of medication and migraine started       HISTORY OFPRESENT ILLNESS  (Location/Symptom, Timing/Onset, Context/Setting, Quality, Duration, Modifying Factors,Severity.)      Thalia Sawyer with a past medical history of asthma, MRSA, seizures, biopsy is a 29 y.o. male who presents with concerns for migraine. Patient was recently admitted with concerns for benzodiazepine withdrawal without complication, does have a history of viral hepatitis C. patient is presenting from insidiously worsening daily headaches w over the course of the past 2 days. Patient states he had similar symptoms when his with early stages of benzodiazepine withdrawal last month including generalized headache as well as concerns for diarrhea, increased urination. Patient was previously seen and admitted here for benzodiazepine withdrawal, was prescribed 30 pills 15 days ago since ran out of his pills approximately 2 days ago. Patient denies fever, chills, does endorse mild pain with extraocular movement, has no focal neurological deficit, does endorse a history of chronic right-sided rib pain as well. Patient does not describe this as the worst headache of his life, does not describe it as a thunderclap headache, patient has taken ibuprofen as well as Excedrin for management of pain prior to this. Recent dose was at 1 PM    PAST MEDICAL / SURGICAL / SOCIAL / FAMILY HISTORY      has a past medical history of Asthma, MRSA (methicillin resistant staph aureus) culture positive, Seizures ([address] and Viral hepatitis C.     has no past surgical history on file.      Social History     Socioeconomic History    Marital status: mouth every 6 hours as needed for Itching for up to 30 doses. 10/9/14   Tammy Warren PA-C   ALPRAZolam Lucia Moran) 1 MG tablet Take 1 mg by mouth daily. Historical Provider, MD       REVIEW OFSYSTEMS    (2-9 systems for level 4, 10 or more for level 5)      Review of Systems   Constitutional: Negative for chills, diaphoresis, fatigue and fever. HENT: Negative for rhinorrhea, sore throat, tinnitus and trouble swallowing. Eyes: Negative for visual disturbance. Respiratory: Negative for cough, chest tightness, shortness of breath and wheezing. Cardiovascular: Negative for chest pain and leg swelling. Gastrointestinal: Negative for abdominal distention, abdominal pain, constipation, diarrhea, nausea and vomiting. Endocrine: Negative for polyuria. Genitourinary: Negative for dysuria, flank pain and frequency. Musculoskeletal: Negative for arthralgias, back pain, joint swelling and myalgias. Neurological: Positive for headaches. Negative for dizziness, tremors, seizures, weakness, light-headedness and numbness. PHYSICAL EXAM   (up to 7 for level 4, 8 or more forlevel 5)      INITIAL VITALS:   ED Triage Vitals [07/23/21 1549]   BP Temp Temp Source Pulse Resp SpO2 Height Weight   135/82 97.1 °F (36.2 °C) Oral 69 16 99 % -- --       Physical Exam  Constitutional:       General: He is not in acute distress. Appearance: He is not ill-appearing. HENT:      Head: Normocephalic and atraumatic. Right Ear: External ear normal.      Left Ear: External ear normal.   Eyes:      Extraocular Movements: Extraocular movements intact. Cardiovascular:      Rate and Rhythm: Normal rate and regular rhythm. Pulmonary:      Effort: Pulmonary effort is normal.   Abdominal:      General: Abdomen is flat. Musculoskeletal:         General: No deformity or signs of injury. Skin:     General: Skin is warm. Capillary Refill: Capillary refill takes less than 2 seconds.    Neurological: General: No focal deficit present. Mental Status: He is oriented to person, place, and time. Mental status is at baseline. Cranial Nerves: No cranial nerve deficit. Sensory: No sensory deficit. Motor: No weakness. Psychiatric:         Mood and Affect: Mood normal.         DIFFERENTIAL  DIAGNOSIS     PLAN (LABS / IMAGING / EKG):  No orders of the defined types were placed in this encounter. MEDICATIONS ORDERED:  Orders Placed This Encounter   Medications    0.9 % sodium chloride bolus    prochlorperazine (COMPAZINE) injection 10 mg    diphenhydrAMINE (BENADRYL) injection 50 mg    clonazePAM (KLONOPIN) tablet 1 mg    clonazePAM (KLONOPIN) 1 MG tablet     Sig: Take 1 tablet by mouth 2 times daily as needed for Anxiety for up to 7 days. Dispense:  14 tablet     Refill:  0       DDX: Denies significant withdrawal, migraine, chronic headache, acute migraine, subarachnoid hemorrhage, meningitis    Initial MDM/Plan/ED COURSE:    29 y.o. male who presents with concerns for generalized headache insidiously worsening over the past 2days complete resolution with ibuprofen, Excedrin. Patient has had similar symptoms prior to his previous benzodiazepine withdrawal, has run out of clonazepam 2 days ago. Brudzinski sign is negative, patient has no focal neurological deficit, does have increased pain with loud noises, extraocular movement, bright lights consistent with potential migraine headache, however seems with patient and similar symptoms prior withdrawal plan to treat patient with clonazepam, plan to treat patient with a migraine cocktail with concerns for migraine reevaluate. Low concerns for subarachnoid hemorrhage in this patient as patient does not describe a thunderclap sudden onset headache, has had insidiously worsening headache over the past couple of days and has had similar headache before.   Patient is afebrile, presents recent negative with low concerns for meningitis based on patient's stable clinical picture. Plan to treat patient with a migraine cocktail, reevaluate with patient most likely to be discharged. Patient previously had a CT head that was negative for any signs of mass, intracranial pathology, although patient previously did have a witnessed seizure thought was that this was secondary to benzodiazepine withdrawal and not from acute focal epileptic nidus. For this I feel confident giving Compazine despite risk of potentially theoretically lowering seizure threshold. Patient's headache resolved, patient discharged with oral benzodiazepine to make his appointment on the 30th. Patient has no questions at time of discharge.:     DIAGNOSTIC RESULTS / EMERGENCYDEPARTMENT COURSE / MDM     LABS:  Labs Reviewed - No data to display        No results found. PROCEDURES:  None    CONSULTS:  None    CRITICAL CARE:  Please see attending note    FINAL IMPRESSION      1. Other migraine without status migrainosus, not intractable    2.  Benzodiazepine withdrawal without complication [facility])         DISPOSITION / PLAN     DISPOSITION        PATIENT REFERRED TO:  [facility]  [address] [place] [address]  [phone]    As needed      DISCHARGE MEDICATIONS:  Discharge Medication List as of 7/23/2021  5:40 PM          Abhi Pete MD  Emergency Medicine Resident    (Please note that portions of this note were completed with a voice recognition program.Efforts were made to edit the dictations but occasionally words are mis-transcribed.)       Abhi Pete MD  Resident  07/23/21 5794

## 2021-07-23 NOTE — ED NOTES
Pt c/o headache x 2 days. Reports sensitivity to light and sound. Pt is alert and oriented. Reports nausea but no vomiting.  Will continue to monitor       Laurita Montalvo RN  07/23/21 3474

## 2021-07-23 NOTE — ED PROVIDER NOTES
[facility]     Emergency Department     Faculty Attestation    I performed a history and physical examination of the patient and discussed management with the resident. I reviewed the residents note and agree with the documented findings and plan of care. Any areas of disagreement are noted on the chart. I was personally present for the key portions of any procedures. I have documented in the chart those procedures where I was not present during the key portions. I have reviewed the emergency nurses triage note. I agree with the chief complaint, past medical history, past surgical history, allergies, medications, social and family history as documented unless otherwise noted below. For Physician Assistant/ Nurse Practitioner cases/documentation I have personally evaluated this patient and have completed at least one if not all key elements of the E/M (history, physical exam, and MDM). Additional findings are as noted. I have personally seen and evaluated the patient. I find the patient's history and physical exam are consistent with the NP/PA documentation. I agree with the care provided, treatment rendered, disposition and follow-up plan. Neurologically intact resting comfortably who patient has been benzodiazepine dependent and has been out of his drugs feels that that may be causing his symptoms he has no other complaints other than headache at this time      Critical Care     Louisa Long M.D.   Attending Emergency  Physician              Princess Favre, MD  07/23/21 8545

## 2021-11-17 ENCOUNTER — HOSPITAL ENCOUNTER (EMERGENCY)
Age: 36
Discharge: HOME | End: 2021-11-17
Attending: EMERGENCY MEDICINE

## 2021-11-17 VITALS
SYSTOLIC BLOOD PRESSURE: 135 MMHG | RESPIRATION RATE: 16 BRPM | DIASTOLIC BLOOD PRESSURE: 92 MMHG | TEMPERATURE: 98.1 F | HEART RATE: 103 BPM | OXYGEN SATURATION: 97 %

## 2021-11-17 ASSESSMENT — PAIN DESCRIPTION - LOCATION: LOCATION: HEAD

## 2021-11-17 ASSESSMENT — PAIN SCALES - GENERAL: PAINLEVEL_OUTOF10: 8

## 2021-11-17 ASSESSMENT — PAIN DESCRIPTION - PAIN TYPE: TYPE: ACUTE PAIN

## 2021-11-17 NOTE — ED PROVIDER NOTES
Patient left without being seen.        Lance Condon, DO  11/18/21 1700 [facility] [address] [facility] Karine, DO  11/18/21 2028

## 2022-02-27 ENCOUNTER — HOSPITAL ENCOUNTER (EMERGENCY)
Age: 37
Discharge: HOME | End: 2022-02-27
Attending: EMERGENCY MEDICINE
Payer: COMMERCIAL

## 2022-02-27 VITALS
RESPIRATION RATE: 11 BRPM | OXYGEN SATURATION: 94 % | HEIGHT: 73 IN | WEIGHT: 150 LBS | BODY MASS INDEX: 19.88 KG/M2 | SYSTOLIC BLOOD PRESSURE: 125 MMHG | HEART RATE: 71 BPM | DIASTOLIC BLOOD PRESSURE: 87 MMHG | TEMPERATURE: 98 F

## 2022-02-27 DIAGNOSIS — F13.930 BENZODIAZEPINE WITHDRAWAL WITHOUT COMPLICATION (HCC): Primary | ICD-10-CM

## 2022-02-27 DIAGNOSIS — R56.9 SEIZURE (HCC): ICD-10-CM

## 2022-02-27 LAB
ABSOLUTE EOS #: 0.11 K/UL (ref 0–0.44)
ABSOLUTE IMMATURE GRANULOCYTE: <0.03 K/UL (ref 0–0.3)
ABSOLUTE LYMPH #: 2.14 K/UL (ref 1.1–3.7)
ABSOLUTE MONO #: 0.47 K/UL (ref 0.1–1.2)
ANION GAP SERPL CALCULATED.3IONS-SCNC: 11 MMOL/L (ref 9–17)
BASOPHILS # BLD: 1 % (ref 0–2)
BASOPHILS ABSOLUTE: 0.05 K/UL (ref 0–0.2)
BUN BLDV-MCNC: 11 MG/DL (ref 6–20)
CALCIUM SERPL-MCNC: 9.6 MG/DL (ref 8.6–10.4)
CHLORIDE BLD-SCNC: 102 MMOL/L (ref 98–107)
CO2: 24 MMOL/L (ref 20–31)
CREAT SERPL-MCNC: 0.83 MG/DL (ref 0.7–1.2)
EOSINOPHILS RELATIVE PERCENT: 2 % (ref 1–4)
GFR AFRICAN AMERICAN: >60 ML/MIN
GFR NON-AFRICAN AMERICAN: >60 ML/MIN
GFR SERPL CREATININE-BSD FRML MDRD: ABNORMAL ML/MIN/{1.73_M2}
GLUCOSE BLD-MCNC: 121 MG/DL (ref 70–99)
HCT VFR BLD CALC: 35.5 % (ref 40.7–50.3)
HEMOGLOBIN: 12.4 G/DL (ref 13–17)
IMMATURE GRANULOCYTES: 0 %
LYMPHOCYTES # BLD: 37 % (ref 24–43)
MAGNESIUM: 1.9 MG/DL (ref 1.6–2.6)
MCH RBC QN AUTO: 29.8 PG (ref 25.2–33.5)
MCHC RBC AUTO-ENTMCNC: 34.9 G/DL (ref 28.4–34.8)
MCV RBC AUTO: 85.3 FL (ref 82.6–102.9)
MONOCYTES # BLD: 8 % (ref 3–12)
NRBC AUTOMATED: 0 PER 100 WBC
PDW BLD-RTO: 12.3 % (ref 11.8–14.4)
PLATELET # BLD: 245 K/UL (ref 138–453)
PMV BLD AUTO: 10 FL (ref 8.1–13.5)
POTASSIUM SERPL-SCNC: 3.4 MMOL/L (ref 3.7–5.3)
RBC # BLD: 4.16 M/UL (ref 4.21–5.77)
SEG NEUTROPHILS: 52 % (ref 36–65)
SEGMENTED NEUTROPHILS ABSOLUTE COUNT: 3.06 K/UL (ref 1.5–8.1)
SODIUM BLD-SCNC: 137 MMOL/L (ref 135–144)
WBC # BLD: 5.9 K/UL (ref 3.5–11.3)

## 2022-02-27 PROCEDURE — 96374 THER/PROPH/DIAG INJ IV PUSH: CPT

## 2022-02-27 PROCEDURE — 99284 EMERGENCY DEPT VISIT MOD MDM: CPT

## 2022-02-27 PROCEDURE — 85025 COMPLETE CBC W/AUTO DIFF WBC: CPT

## 2022-02-27 PROCEDURE — 83735 ASSAY OF MAGNESIUM: CPT

## 2022-02-27 PROCEDURE — 6360000002 HC RX W HCPCS: Performed by: STUDENT IN AN ORGANIZED HEALTH CARE EDUCATION/TRAINING PROGRAM

## 2022-02-27 PROCEDURE — 80048 BASIC METABOLIC PNL TOTAL CA: CPT

## 2022-02-27 RX ORDER — LORAZEPAM 2 MG/ML
1 INJECTION INTRAMUSCULAR ONCE
Status: COMPLETED | OUTPATIENT
Start: 2022-02-27 | End: 2022-02-27

## 2022-02-27 RX ORDER — CLONAZEPAM 1 MG/1
0.5 TABLET ORAL 2 TIMES DAILY PRN
Qty: 14 TABLET | Refills: 0 | Status: SHIPPED | OUTPATIENT
Start: 2022-02-27 | End: 2022-03-13

## 2022-02-27 RX ADMIN — LORAZEPAM 1 MG: 2 INJECTION INTRAMUSCULAR; INTRAVENOUS at 16:18

## 2022-02-27 NOTE — ED TRIAGE NOTES
Pt reports around 1330 today was at home, found himself on the floor, thinks he made of had a seizure     Reports that he did not hit his head, , denies any neck or back pain at this time   Takes Klonopin 1 mg as needed trying to wean himself he tells the resident [place]   Pt reports he did not loss control of bowel or bladder  Unsure of how long the seizure lasted     PT also reports that he has not had much of an appetite for 3 to 4 days   Is able to drink fluids, but in small amounts  Denies any nausea or vomiting  Bowel and bladder habits are normal , pt reports
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
## 2022-02-27 NOTE — ED NOTES
The following labs labeled with pt sticker and tubed to lab:     [] Blue     [x] Lavender   [] on ice  [x] Green/yellow  [] Green/black [] on ice  [] Yellow  [] Red  [] Pink      [] COVID-19 swab    [] Rapid  [] PCR  [] Flu swab  [] Peds Viral Panel     [] Urine Sample  [] Pelvic Cultures  [] Blood Cultures            Nii Ortiz RN  02/27/22 3663

## 2022-02-28 NOTE — ED PROVIDER NOTES
[address] [facility]  ED  Emergency Department Encounter  EmergencyMedicine Resident     Pt Name:Kole Hall  MRN: [id number]  [place] 1985  Date of evaluation: 2/28/22  PCP:  No primary care provider on file. CHIEF COMPLAINT       Chief Complaint   Patient presents with    Seizures       HISTORY OF PRESENT ILLNESS  (Location/Symptom, Timing/Onset, Context/Setting, Quality, Duration, Modifying Factors, Severity.)      Haleigh Terry is a 29 y.o. male who presents with Seizure. Patient notes that he woke and notes that he is concerned he had a seizure. He is having any pain at this time. He notes that he ran out of his clonazepam 0.5 mg that he is been taking twice a day. He notes that when he runs out of his benzodiazepines for his anxiety usually has seizures. He is not take anything else for seizures. He notes that he is been outside out of his clonazepam for the past 3 days. Is also feeling anxious, agitated and having tremors throughout his whole body. He denies any, fevers, chills, change vision, headache, neck pain, back pain, chest pain, shortness breath, abdominal pain, nausea/vomiting, or any other concerns. PAST MEDICAL / SURGICAL / SOCIAL / FAMILY HISTORY      has a past medical history of Asthma, MRSA (methicillin resistant staph aureus) culture positive, Seizures ([facility] [address] and Viral hepatitis C. Denies any pertinent past surgical history.     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  Sexual activity: Not on file   Other Topics Concern    Not on file   Social History Narrative    Not on file     Social Determinants of Health     Financial Resource Strain:     Difficulty of Paying Living Expenses: Not on file   Food Insecurity:     Worried About Running Out of Food in the Last Year: Not on file    Nancy of Food in the Last Year: Not on file   Transportation Needs:     Lack of Transportation (Medical): Not on file    Lack of Transportation (Non-Medical): Not on file   Physical Activity:     Days of Exercise per Week: Not on file    Minutes of Exercise per Session: Not on file   Stress:     Feeling of Stress : Not on file   Social Connections:     Frequency of Communication with Friends and Family: Not on file    Frequency of Social Gatherings with Friends and Family: Not on file    Attends Anglican Services: Not on file    Active Member of [address] [place] [address] [employer] or Organizations: Not on file    Attends Club or Organization Meetings: Not on file    Marital Status: Not on file   Intimate Partner Violence:     Fear of Current or Ex-Partner: Not on file    Emotionally Abused: Not on file    Physically Abused: Not on file    Sexually Abused: Not on file   Housing Stability:     Unable to Pay for Housing in the Last Year: Not on file    Number of [place] in the Last Year: Not on file    Unstable Housing in the Last Year: Not on file       History reviewed. No pertinent family history. Allergies:  Hydrocodone    Home Medications:  Prior to Admission medications    Medication Sig Start Date End Date Taking? Authorizing Provider   clonazePAM (KLONOPIN) 1 MG tablet Take 0.5 tablets by mouth 2 times daily as needed for Anxiety for up to 14 days. 2/27/22 3/13/22 Yes Wang Rodriguez, DO   methadone 10 MG/5ML solution 205 mg. Yes Historical Provider, MD   diphenhydrAMINE (BENADRYL) 25 MG capsule Take 1 capsule by mouth every 6 hours as needed for Itching for up to 30 doses.  10/9/14 Adam Ramirez PA-C   ALPRAZolam Clifm Combe) 1 MG tablet Take 1 mg by mouth daily. Historical Provider, MD       REVIEW OF SYSTEMS    (2-9 systems for level 4, 10 or more for level 5)      Review of Systems   Constitutional: Negative for chills, fatigue and fever. HENT: Negative for congestion and sore throat. Eyes: Negative for photophobia and visual disturbance. Respiratory: Negative for cough and shortness of breath. Cardiovascular: Negative for chest pain, palpitations and leg swelling. Gastrointestinal: Negative for abdominal pain, constipation, diarrhea, nausea and vomiting. Genitourinary: Negative for dysuria and hematuria. Musculoskeletal: Negative for arthralgias and myalgias. Skin: Negative for rash and wound. Neurological: Positive for tremors. Negative for weakness, light-headedness, numbness and headaches. Psychiatric/Behavioral: Positive for agitation. PHYSICAL EXAM   (up to 7 for level 4, 8 or more for level 5)      INITIAL VITALS:   /87   Pulse 71   Temp 98 °F (36.7 °C) (Oral)   Resp 11   Ht 6' 1\" (1.854 m)   Wt 150 lb (68 kg)   SpO2 94%   BMI 19.79 kg/m²     Physical Exam  Vitals and nursing note reviewed. Constitutional:       General: He is not in acute distress. Appearance: Normal appearance. He is obese. He is not ill-appearing or toxic-appearing. HENT:      Head: Normocephalic. Right Ear: Tympanic membrane, ear canal and external ear normal.      Left Ear: Tympanic membrane, ear canal and external ear normal.      Ears:      Comments: B/l TM negative for hemotympanum     Nose: Nose normal.      Comments: B/l negative nasal hematomas     Mouth/Throat:      Mouth: Mucous membranes are moist.      Pharynx: Oropharynx is clear. Comments: No tongue biting noted. Eyes:      General: No scleral icterus. Extraocular Movements: Extraocular movements intact.       Conjunctiva/sclera: Conjunctivae normal.      Pupils: Pupils are equal, round, and reactive to light. Cardiovascular:      Rate and Rhythm: Normal rate. Pulses: Normal pulses. Heart sounds: No murmur heard. No friction rub. No gallop. Pulmonary:      Effort: Pulmonary effort is normal. No respiratory distress. Breath sounds: Normal breath sounds. Abdominal:      General: Abdomen is flat. There is no distension. Tenderness: There is no abdominal tenderness. There is no guarding or rebound. Musculoskeletal:         General: No swelling or tenderness. Normal range of motion. Cervical back: Normal range of motion and neck supple. No rigidity or tenderness. Comments: Full A/P ROM of all extremities out crepitus or pain. No tenderness, step-offs, or deformities noted to the spinal column. Skin:     General: Skin is warm and dry. Capillary Refill: Capillary refill takes less than 2 seconds. Neurological:      General: No focal deficit present. Mental Status: He is alert and oriented to person, place, and time. Mental status is at baseline. Comments: Alert to person place and time. GCS of 15. Cranial nerves II through XII intact on examination. 5/5 strength in all extremities. Sensation is equal and intact to light touch and pain in all extremities. Normal finger-nose and heel shin test.  When patient's arms are outstretched, he does have tremors in them along with his tongue when extended out of his mouth. DIFFERENTIAL  DIAGNOSIS     PLAN (LABS / IMAGING / EKG):  Orders Placed This Encounter   Procedures    CBC with Auto Differential    Basic Metabolic Panel w/ Reflex to MG    Magnesium    Insert peripheral IV       MEDICATIONS ORDERED:  Orders Placed This Encounter   Medications    LORazepam (ATIVAN) injection 1 mg    clonazePAM (KLONOPIN) 1 MG tablet     Sig: Take 0.5 tablets by mouth 2 times daily as needed for Anxiety for up to 14 days.      Dispense:  14 tablet     Refill:  0       DDX: Benzodiazepine withdrawal, electrolyte abnormality    DIAGNOSTIC RESULTS / EMERGENCY DEPARTMENT COURSE / MDM   LAB RESULTS:  Results for orders placed or performed during the hospital encounter of 02/27/22   CBC with Auto Differential   Result Value Ref Range    WBC 5.9 3.5 - 11.3 k/uL    RBC 4.16 (L) 4.21 - 5.77 m/uL    Hemoglobin 12.4 (L) 13.0 - 17.0 g/dL    Hematocrit 35.5 (L) 40.7 - 50.3 %    MCV 85.3 82.6 - 102.9 fL    MCH 29.8 25.2 - 33.5 pg    MCHC 34.9 (H) 28.4 - 34.8 g/dL    RDW 12.3 11.8 - 14.4 %    Platelets 143 569 - 332 k/uL    MPV 10.0 8.1 - 13.5 fL    NRBC Automated 0.0 0.0 per 100 WBC    Seg Neutrophils 52 36 - 65 %    Lymphocytes 37 24 - 43 %    Monocytes 8 3 - 12 %    Eosinophils % 2 1 - 4 %    Basophils 1 0 - 2 %    Immature Granulocytes 0 0 %    Segs Absolute 3.06 1.50 - 8.10 k/uL    Absolute Lymph # 2.14 1.10 - 3.70 k/uL    Absolute Mono # 0.47 0.10 - 1.20 k/uL    Absolute Eos # 0.11 0.00 - 0.44 k/uL    Basophils Absolute 0.05 0.00 - 0.20 k/uL    Absolute Immature Granulocyte <0.03 0.00 - 0.30 k/uL   Basic Metabolic Panel w/ Reflex to MG   Result Value Ref Range    Glucose 121 (H) 70 - 99 mg/dL    BUN 11 6 - 20 mg/dL    CREATININE 0.83 0.70 - 1.20 mg/dL    Calcium 9.6 8.6 - 10.4 mg/dL    Sodium 137 135 - 144 mmol/L    Potassium 3.4 (L) 3.7 - 5.3 mmol/L    Chloride 102 98 - 107 mmol/L    CO2 24 20 - 31 mmol/L    Anion Gap 11 9 - 17 mmol/L    GFR Non-African American >60 >60 mL/min    GFR African American >60 >60 mL/min    GFR Comment         Magnesium   Result Value Ref Range    Magnesium 1.9 1.6 - 2.6 mg/dL       IMPRESSION: This is a 71-year-old male presenting forPatient presents for concerns of seizures after withdrawing from benzodiazepine. Patient appears to be in no acute distress and nontoxic-appearing. Patient's initial vitals are stable and nonconcerning. Patient moving all extremities and having tremors and both his extremities and tongue. Patient has no respiratory stress.   No focal neuro deficits. Concern for above differential diagnosis. Will order CBC, BMP, and Ativan. Will monitor. RADIOLOGY:  None    EKG  None    All EKG's are interpreted by the Emergency Department Physician who either signs or Co-signs this chart in the absence of a cardiologist.    EMERGENCY DEPARTMENT COURSE:    Patient is feeling better after receiving 1 mg of Ativan. Patient was educated on the importance of not becoming physically dependent on benzodiazepines given the concerns for withdrawal and seizures. .  Patient is agreeable discharge plan. Patient was educated return precautions. Patient and significant other ambulated out of the ER without incident. PROCEDURES:  None    CONSULTS:  None    CRITICAL CARE:  Please see attending note    FINAL IMPRESSION      1.  Benzodiazepine withdrawal without complication ([address]    2. Seizure ([facility] [address]          DISPOSITION / PLAN     DISPOSITION Decision To Discharge 02/27/2022 05:27:50 PM      PATIENT REFERRED TO:  [facility] ED  [address] [place] [address]  [phone]  Go to   If symptoms worsen    Hany Gan, APRN [facility]  [address] [facility]  [phone]    Schedule an appointment as soon as possible for a visit in 1 week  for reevaluation regarding this visit      DISCHARGE MEDICATIONS:  Discharge Medication List as of 2/27/2022  5:34 PM          Anahi Mao DO  Emergency Medicine Resident    (Please note that portions of thisnote were completed with a voice recognition program.  Efforts were made to edit the dictations but occasionally words are mis-transcribed.)       Anahi Mao DO  Resident  03/03/22 1671

## 2022-03-03 ASSESSMENT — ENCOUNTER SYMPTOMS
PHOTOPHOBIA: 0
VOMITING: 0
SHORTNESS OF BREATH: 0
NAUSEA: 0
CONSTIPATION: 0
ABDOMINAL PAIN: 0
DIARRHEA: 0
SORE THROAT: 0
COUGH: 0

## 2023-01-20 ENCOUNTER — HOSPITAL ENCOUNTER (EMERGENCY)
Age: 38
Discharge: HOME | End: 2023-01-20
Attending: EMERGENCY MEDICINE
Payer: COMMERCIAL

## 2023-01-20 VITALS
RESPIRATION RATE: 18 BRPM | HEART RATE: 99 BPM | OXYGEN SATURATION: 97 % | WEIGHT: 170 LBS | SYSTOLIC BLOOD PRESSURE: 150 MMHG | BODY MASS INDEX: 22.53 KG/M2 | DIASTOLIC BLOOD PRESSURE: 89 MMHG | HEIGHT: 73 IN | TEMPERATURE: 98.6 F

## 2023-01-20 DIAGNOSIS — L03.114 CELLULITIS OF LEFT UPPER EXTREMITY: Primary | ICD-10-CM

## 2023-01-20 PROCEDURE — 6370000000 HC RX 637 (ALT 250 FOR IP): Performed by: STUDENT IN AN ORGANIZED HEALTH CARE EDUCATION/TRAINING PROGRAM

## 2023-01-20 PROCEDURE — 99284 EMERGENCY DEPT VISIT MOD MDM: CPT

## 2023-01-20 PROCEDURE — 90715 TDAP VACCINE 7 YRS/> IM: CPT | Performed by: STUDENT IN AN ORGANIZED HEALTH CARE EDUCATION/TRAINING PROGRAM

## 2023-01-20 PROCEDURE — 6360000002 HC RX W HCPCS: Performed by: STUDENT IN AN ORGANIZED HEALTH CARE EDUCATION/TRAINING PROGRAM

## 2023-01-20 PROCEDURE — 90471 IMMUNIZATION ADMIN: CPT | Performed by: STUDENT IN AN ORGANIZED HEALTH CARE EDUCATION/TRAINING PROGRAM

## 2023-01-20 PROCEDURE — 96372 THER/PROPH/DIAG INJ SC/IM: CPT

## 2023-01-20 RX ORDER — DOXYCYCLINE HYCLATE 100 MG
100 TABLET ORAL 2 TIMES DAILY
Qty: 20 TABLET | Refills: 0 | Status: SHIPPED | OUTPATIENT
Start: 2023-01-20 | End: 2023-01-30

## 2023-01-20 RX ORDER — DOXYCYCLINE HYCLATE 100 MG
100 TABLET ORAL ONCE
Status: COMPLETED | OUTPATIENT
Start: 2023-01-20 | End: 2023-01-20

## 2023-01-20 RX ADMIN — TETANUS TOXOID, REDUCED DIPHTHERIA TOXOID AND ACELLULAR PERTUSSIS VACCINE, ADSORBED 0.5 ML: 5; 2.5; 8; 8; 2.5 SUSPENSION INTRAMUSCULAR at 13:33

## 2023-01-20 RX ADMIN — DOXYCYCLINE HYCLATE 100 MG: 100 TABLET, COATED ORAL at 13:33

## 2023-01-20 ASSESSMENT — PAIN DESCRIPTION - ORIENTATION: ORIENTATION: LEFT

## 2023-01-20 ASSESSMENT — PAIN - FUNCTIONAL ASSESSMENT: PAIN_FUNCTIONAL_ASSESSMENT: 0-10

## 2023-01-20 ASSESSMENT — ENCOUNTER SYMPTOMS
RHINORRHEA: 0
DIARRHEA: 0
ABDOMINAL PAIN: 0
SHORTNESS OF BREATH: 0
VOMITING: 0
COLOR CHANGE: 1
NAUSEA: 0
COUGH: 0
CONSTIPATION: 0
BACK PAIN: 0

## 2023-01-20 ASSESSMENT — PAIN SCALES - GENERAL: PAINLEVEL_OUTOF10: 4

## 2023-01-20 ASSESSMENT — PAIN DESCRIPTION - LOCATION: LOCATION: WRIST
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
## 2023-01-20 NOTE — PROGRESS NOTES
I signed up for this patient in error. I did not participate in the care of this patient today.     Pam Bueno, DO  PGY1

## 2023-01-20 NOTE — ED PROVIDER NOTES
[address]     Emergency Department     Faculty Attestation    I performed a history and physical examination of the patient and discussed management with the resident. I reviewed the residents note and agree with the documented findings and plan of care. Any areas of disagreement are noted on the chart. I was personally present for the key portions of any procedures. I have documented in the chart those procedures where I was not present during the key portions. I have reviewed the emergency nurses triage note. I agree with the chief complaint, past medical history, past surgical history, allergies, medications, social and family history as documented unless otherwise noted below. For Physician Assistant/ Nurse Practitioner cases/documentation I have personally evaluated this patient and have completed at least one if not all key elements of the E/M (history, physical exam, and MDM). Additional findings are as noted. I have personally seen and evaluated the patient. I find the patient's history and physical exam are consistent with the NP/PA documentation. I agree with the care provided, treatment rendered, disposition and follow-up plan. 44-year-old male presenting with wrist swelling. Patient utilized this area to inject drugs yesterday. He was unable to get his typical methadone dose and relapsed. Hand started swelling today. No finger involvement or thumb involvement. No fevers or chills. No drainage from the area. Exam:  General : Laying on the bed, awake, alert, and in no acute distress  MSK/skin: Swollen erythematous area over the left wrist.  2+ radial pulse. No decreased range of motion    Plan:  Bedside ultrasound shows no large fluid pocket to be drained. Likely cellulitis over abscess. Patient denies any breakage of the needle into the area.   Will start on doxycycline for soft tissue coverage, encouraged him to follow-up with his PCP or return to the ER if it does not significantly improve in the next 24 to 48 hours. Patient expressed understanding, discharged home.       Charissa Carroll MD   Attending Emergency Physician    (Please note that portions of this note were completed with a voice recognition program. Efforts were made to edit the dictations but occasionally words are mis-transcribed.)           Charissa Carroll MD  01/20/23 9929

## 2023-01-20 NOTE — DISCHARGE INSTRUCTIONS
You were seen in the emergency department today for swelling of the left hand. This appears to be cellulitis and antibiotics were sent to your pharmacy. Please follow-up with your PCP. If you are having any new or worsening symptoms, especially difficulty moving your thumb or other fingers, please return to the emergency department immediately. He can use Tylenol and ibuprofen as needed for additional pain control. Thank you for visiting [facility] Emergency Department. You need to call NOT ON FILE, MD to make an appointment as directed for follow up. Should you have any questions regarding your care or further treatment, please call [facility] Emergency Department at [phone]. Take any medications as prescribed, if given any, otherwise for pain Use ibuprofen or Tylenol (unless prescribed medications that have Tylenol in it). You can take over the counter Ibuprofen (advil) tablets (4 tablets every 8 hours or 3 tablets every 6 hours or 2 tablets every 4 hours)    If given narcotics during this ED visit, please do not drive or operate heavy machinery for at least 4-6 hours. PLEASE RETURN TO THE ED IMMEDIATELY for worsening symptoms, or if you develop any concerning symptoms such as: high fever not relieved by tylenol and/or motrin, chills, shortness of breath, chest pain, persistent nausea and/or vomiting, numbness, weakness or tingling in the arms or legs or change in color of the extremities, changes in mental status, persistent headache, blurry vision, inability to urinate, unable to follow up with your physician, or other any other  Care or concern.

## 2023-01-20 NOTE — ED PROVIDER NOTES
[facility] ED  Emergency Department Encounter  Emergency Medicine Resident     Pt Name:Kole Garcia  MRN: [id number]  [place] 1985  Date of evaluation: 1/20/23  PCP:  NOT ON FILE, MD  Note Started: 1:07 PM EST      CHIEF COMPLAINT       Chief Complaint   Patient presents with    Wound Infection     Attempted to use iv drugs over the weekend and now has reddened inflamed left wrist       HISTORY OF PRESENT ILLNESS  (Location/Symptom, Timing/Onset, Context/Setting, Quality, Duration, Modifying Factors, Severity.)      Rebekah Slade is a 40 y.o. male who presents with pain and swelling of the left wrist.  Patient states he missed his clinic appointment for methadone/Suboxone over the weekend. He decided to use and injected into his right wrist.  He has had increased swelling and pain in that area since injecting. He states he used sterile needle to inject. He denies any chance of this needle breaking. No fevers or chills. Pain and swelling is localized to the wrist and left hand. No chest pain, shortness of breath, other complaints at this time. PAST MEDICAL / SURGICAL / SOCIAL / FAMILY HISTORY      has a past medical history of Asthma, MRSA (methicillin resistant staph aureus) culture positive, Seizures ([address] and Viral hepatitis C.       has no past surgical history on file.       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 Allergies:  Hydrocodone    Home Medications:  Prior to Admission medications    Medication Sig Start Date End Date Taking? Authorizing Provider   doxycycline hyclate (VIBRA-TABS) 100 MG tablet Take 1 tablet by mouth 2 times daily for 10 days 1/20/23 1/30/23 Yes Elizabeth Odonnell,    clonazePAM (KLONOPIN) 1 MG tablet Take 0.5 tablets by mouth 2 times daily as needed for Anxiety for up to 14 days. 2/27/22 3/13/22  Samantha Darby,    methadone 10 MG/5ML solution 205 mg. Historical Provider, MD   diphenhydrAMINE (BENADRYL) 25 MG capsule Take 1 capsule by mouth every 6 hours as needed for Itching for up to 30 doses. 10/9/14   Carlos Laughlin PA-C   ALPRAZolam Jazzy Alonso) 1 MG tablet Take 1 mg by mouth daily. Historical Provider, MD         REVIEW OF SYSTEMS       Review of Systems   Constitutional:  Negative for chills and fever. HENT:  Negative for congestion and rhinorrhea. Eyes:  Negative for visual disturbance. Respiratory:  Negative for cough and shortness of breath. Cardiovascular:  Negative for chest pain. Gastrointestinal:  Negative for abdominal pain, constipation, diarrhea, nausea and vomiting. Musculoskeletal:  Positive for joint swelling. Negative for back pain and neck pain. Skin:  Positive for color change. Negative for rash. Neurological:  Negative for weakness, numbness and headaches.      PHYSICAL EXAM      INITIAL VITALS:   BP (!) 150/89   Pulse 99   Temp 98.6 °F (37 °C) (Oral)   Resp 18   Ht 6' 1\" (1.854 m)   Wt 170 lb (77.1 kg)   SpO2 97%   BMI 22.43 kg/m²     Physical Exam  Constitutional:       General: He is not in acute distress. Appearance: Normal appearance. He is not ill-appearing, toxic-appearing or diaphoretic. HENT:      Head: Normocephalic and atraumatic. Mouth/Throat:      Mouth: Mucous membranes are moist.      Pharynx: Oropharynx is clear. Eyes:      Extraocular Movements: Extraocular movements intact. Cardiovascular:      Rate and Rhythm: Normal rate and regular rhythm. Heart sounds: Normal heart sounds. No murmur heard. Pulmonary:      Effort: Pulmonary effort is normal. No respiratory distress. Breath sounds: Normal breath sounds. No wheezing or rhonchi. Abdominal:      Palpations: Abdomen is soft. Tenderness: There is no abdominal tenderness. Musculoskeletal:         General: Normal range of motion. Cervical back: Normal range of motion and neck supple. Comments: Tenderness palpation over the distal left radius where it is focally swollen, erythematous, no obvious fluctuance. Swelling throughout the left hand as well. Full range of motion of the thumb and fingers on the left hand. No significant pain with this   Skin:     General: Skin is warm and dry. Comments: Localized swelling and erythema over the distal radius extending into the hand. Neurological:      General: No focal deficit present. Mental Status: He is alert and oriented to person, place, and time. DDX/DIAGNOSTIC RESULTS / EMERGENCY DEPARTMENT COURSE / MDM     Medical Decision Making  50-year-old male presenting with pain and swelling of the left wrist and hand. This happened after injecting heroin a few days ago. Patient appears well otherwise, vitals are stable. He has localized swelling and erythema consistent with cellulitis. Bedside ultrasound was performed to evaluate for abscess that could be amenable to drainage. No abscess identified, significant cobblestoning and edema noted. Patient was given doxycycline here in addition to updating his Tdap.   Prescription sent to his pharmacy. Discharged in stable condition. Return precautions given, especially to look out for any difficulty with range of motion of the thumb or other fingers. Risk  Prescription drug management. EKG      All EKG's are interpreted by the Emergency Department Physician who either signs or Co-signs this chart in the absence of a cardiologist.    EMERGENCY DEPARTMENT COURSE:  See above         PROCEDURES:      CONSULTS:  None    CRITICAL CARE:  There was significant risk of life threatening deterioration of patient's condition requiring my direct management. Critical care time  minutes, excluding any documented procedures. FINAL IMPRESSION      1.  Cellulitis of left upper extremity          DISPOSITION / PLAN     DISPOSITION Decision To Discharge 01/20/2023 01:21:57 PM      PATIENT REFERRED TO:  [facility] ED  [address] [place] [address]  [phone]    If symptoms worsen    Your PCP    Schedule an appointment as soon as possible for a visit in 3 days      DISCHARGE MEDICATIONS:  Discharge Medication List as of 1/20/2023  1:49 PM        START taking these medications    Details   doxycycline hyclate (VIBRA-TABS) 100 MG tablet Take 1 tablet by mouth 2 times daily for 10 days, Disp-20 tablet, R-0Normal             Flores Hathaway DO  Emergency Medicine Resident    (Please note that portions of thisnote were completed with a voice recognition program.  Efforts were made to edit the dictations but occasionally words are mis-transcribed.)       Flores Hathaway DO  Resident  01/20/23 2055

## 2023-12-09 ENCOUNTER — HOSPITAL ENCOUNTER (INPATIENT)
Age: 38
LOS: 4 days | Discharge: HOME HEALTH | DRG: 710 | End: 2023-12-13
Attending: EMERGENCY MEDICINE | Admitting: INTERNAL MEDICINE
Payer: COMMERCIAL

## 2023-12-09 ENCOUNTER — APPOINTMENT (OUTPATIENT)
Dept: GENERAL RADIOLOGY | Age: 38
DRG: 710 | End: 2023-12-09
Payer: COMMERCIAL

## 2023-12-09 DIAGNOSIS — A41.9 SEPSIS WITHOUT ACUTE ORGAN DYSFUNCTION, DUE TO UNSPECIFIED ORGANISM (HCC): ICD-10-CM

## 2023-12-09 DIAGNOSIS — L08.9 SOFT TISSUE INFECTION: Primary | ICD-10-CM

## 2023-12-09 PROBLEM — L03.90 CELLULITIS: Status: ACTIVE | Noted: 2023-12-09

## 2023-12-09 LAB
ALBUMIN SERPL-MCNC: 4.3 G/DL (ref 3.5–5.2)
ALBUMIN/GLOB SERPL: 1 {RATIO} (ref 1–2.5)
ALP SERPL-CCNC: 128 U/L (ref 40–129)
ALT SERPL-CCNC: 12 U/L (ref 5–41)
ANION GAP SERPL CALCULATED.3IONS-SCNC: 13 MMOL/L (ref 9–17)
AST SERPL-CCNC: 25 U/L
BASOPHILS # BLD: 0.05 K/UL (ref 0–0.2)
BASOPHILS NFR BLD: 1 % (ref 0–2)
BILIRUB SERPL-MCNC: 0.3 MG/DL (ref 0.3–1.2)
BILIRUB UR QL STRIP: NEGATIVE
BUN SERPL-MCNC: 11 MG/DL (ref 6–20)
CALCIUM SERPL-MCNC: 9.8 MG/DL (ref 8.6–10.4)
CHLORIDE SERPL-SCNC: 100 MMOL/L (ref 98–107)
CLARITY UR: CLEAR
CO2 SERPL-SCNC: 22 MMOL/L (ref 20–31)
COLOR UR: YELLOW
CREAT SERPL-MCNC: 0.9 MG/DL (ref 0.7–1.2)
CRP SERPL HS-MCNC: 11.1 MG/L (ref 0–5)
EOSINOPHIL # BLD: 0.04 K/UL (ref 0–0.44)
EOSINOPHILS RELATIVE PERCENT: 1 % (ref 1–4)
EPI CELLS #/AREA URNS HPF: ABNORMAL /HPF (ref 0–5)
ERYTHROCYTE [DISTWIDTH] IN BLOOD BY AUTOMATED COUNT: 12.8 % (ref 11.8–14.4)
ERYTHROCYTE [SEDIMENTATION RATE] IN BLOOD BY PHOTOMETRIC METHOD: 74 MM/HR (ref 0–15)
GFR SERPL CREATININE-BSD FRML MDRD: >60 ML/MIN/1.73M2
GLUCOSE SERPL-MCNC: 115 MG/DL (ref 70–99)
GLUCOSE UR STRIP-MCNC: NEGATIVE MG/DL
HCT VFR BLD AUTO: 39.7 % (ref 40.7–50.3)
HGB BLD-MCNC: 13.1 G/DL (ref 13–17)
HGB UR QL STRIP.AUTO: NEGATIVE
IMM GRANULOCYTES # BLD AUTO: 0.04 K/UL (ref 0–0.3)
IMM GRANULOCYTES NFR BLD: 1 %
KETONES UR STRIP-MCNC: ABNORMAL MG/DL
LACTIC ACID, WHOLE BLOOD: 2.4 MMOL/L (ref 0.7–2.1)
LEUKOCYTE ESTERASE UR QL STRIP: NEGATIVE
LYMPHOCYTES NFR BLD: 1.22 K/UL (ref 1.1–3.7)
LYMPHOCYTES RELATIVE PERCENT: 17 % (ref 24–43)
MCH RBC QN AUTO: 28.9 PG (ref 25.2–33.5)
MCHC RBC AUTO-ENTMCNC: 33 G/DL (ref 28.4–34.8)
MCV RBC AUTO: 87.6 FL (ref 82.6–102.9)
MONOCYTES NFR BLD: 0.44 K/UL (ref 0.1–1.2)
MONOCYTES NFR BLD: 6 % (ref 3–12)
NEUTROPHILS NFR BLD: 74 % (ref 36–65)
NEUTS SEG NFR BLD: 5.3 K/UL (ref 1.5–8.1)
NITRITE UR QL STRIP: NEGATIVE
NRBC BLD-RTO: 0 PER 100 WBC
PH UR STRIP: 7 [PH] (ref 5–8)
PLATELET # BLD AUTO: 383 K/UL (ref 138–453)
PMV BLD AUTO: 10.1 FL (ref 8.1–13.5)
POTASSIUM SERPL-SCNC: 4.4 MMOL/L (ref 3.7–5.3)
PROT SERPL-MCNC: 8.7 G/DL (ref 6.4–8.3)
PROT UR STRIP-MCNC: NEGATIVE MG/DL
RBC # BLD AUTO: 4.53 M/UL (ref 4.21–5.77)
RBC #/AREA URNS HPF: ABNORMAL /HPF (ref 0–2)
SODIUM SERPL-SCNC: 135 MMOL/L (ref 135–144)
SP GR UR STRIP: 1.02 (ref 1–1.03)
UROBILINOGEN UR STRIP-ACNC: NORMAL EU/DL (ref 0–1)
WBC #/AREA URNS HPF: ABNORMAL /HPF (ref 0–5)
WBC OTHER # BLD: 7.1 K/UL (ref 3.5–11.3)

## 2023-12-09 PROCEDURE — 83605 ASSAY OF LACTIC ACID: CPT

## 2023-12-09 PROCEDURE — 99254 IP/OBS CNSLTJ NEW/EST MOD 60: CPT | Performed by: INTERNAL MEDICINE

## 2023-12-09 PROCEDURE — 36415 COLL VENOUS BLD VENIPUNCTURE: CPT

## 2023-12-09 PROCEDURE — 6360000002 HC RX W HCPCS

## 2023-12-09 PROCEDURE — 85652 RBC SED RATE AUTOMATED: CPT

## 2023-12-09 PROCEDURE — 2580000003 HC RX 258

## 2023-12-09 PROCEDURE — 1200000000 HC SEMI PRIVATE

## 2023-12-09 PROCEDURE — 87040 BLOOD CULTURE FOR BACTERIA: CPT

## 2023-12-09 PROCEDURE — 6370000000 HC RX 637 (ALT 250 FOR IP)

## 2023-12-09 PROCEDURE — 73590 X-RAY EXAM OF LOWER LEG: CPT

## 2023-12-09 PROCEDURE — 2580000003 HC RX 258: Performed by: STUDENT IN AN ORGANIZED HEALTH CARE EDUCATION/TRAINING PROGRAM

## 2023-12-09 PROCEDURE — 99222 1ST HOSP IP/OBS MODERATE 55: CPT | Performed by: INTERNAL MEDICINE

## 2023-12-09 PROCEDURE — 87641 MR-STAPH DNA AMP PROBE: CPT

## 2023-12-09 PROCEDURE — 96375 TX/PRO/DX INJ NEW DRUG ADDON: CPT

## 2023-12-09 PROCEDURE — 6360000002 HC RX W HCPCS: Performed by: STUDENT IN AN ORGANIZED HEALTH CARE EDUCATION/TRAINING PROGRAM

## 2023-12-09 PROCEDURE — 73630 X-RAY EXAM OF FOOT: CPT

## 2023-12-09 PROCEDURE — 87070 CULTURE OTHR SPECIMN AEROBIC: CPT

## 2023-12-09 PROCEDURE — 86140 C-REACTIVE PROTEIN: CPT

## 2023-12-09 PROCEDURE — 6370000000 HC RX 637 (ALT 250 FOR IP): Performed by: STUDENT IN AN ORGANIZED HEALTH CARE EDUCATION/TRAINING PROGRAM

## 2023-12-09 PROCEDURE — 96374 THER/PROPH/DIAG INJ IV PUSH: CPT

## 2023-12-09 PROCEDURE — 85025 COMPLETE CBC W/AUTO DIFF WBC: CPT

## 2023-12-09 PROCEDURE — 86403 PARTICLE AGGLUT ANTBDY SCRN: CPT

## 2023-12-09 PROCEDURE — 87205 SMEAR GRAM STAIN: CPT

## 2023-12-09 PROCEDURE — 87186 SC STD MICRODIL/AGAR DIL: CPT

## 2023-12-09 PROCEDURE — 99222 1ST HOSP IP/OBS MODERATE 55: CPT | Performed by: PODIATRIST

## 2023-12-09 PROCEDURE — 80053 COMPREHEN METABOLIC PANEL: CPT

## 2023-12-09 PROCEDURE — 99285 EMERGENCY DEPT VISIT HI MDM: CPT

## 2023-12-09 PROCEDURE — 81001 URINALYSIS AUTO W/SCOPE: CPT

## 2023-12-09 PROCEDURE — 71045 X-RAY EXAM CHEST 1 VIEW: CPT

## 2023-12-09 PROCEDURE — 87086 URINE CULTURE/COLONY COUNT: CPT

## 2023-12-09 PROCEDURE — 87075 CULTR BACTERIA EXCEPT BLOOD: CPT

## 2023-12-09 RX ORDER — CLONAZEPAM 0.5 MG/1
0.5 TABLET ORAL 3 TIMES DAILY PRN
Status: DISCONTINUED | OUTPATIENT
Start: 2023-12-09 | End: 2023-12-13 | Stop reason: HOSPADM

## 2023-12-09 RX ORDER — AMLODIPINE BESYLATE 5 MG/1
5 TABLET ORAL DAILY
Status: DISCONTINUED | OUTPATIENT
Start: 2023-12-09 | End: 2023-12-11

## 2023-12-09 RX ORDER — SERTRALINE HYDROCHLORIDE 100 MG/1
100 TABLET, FILM COATED ORAL DAILY
COMMUNITY

## 2023-12-09 RX ORDER — CLONIDINE HYDROCHLORIDE 0.2 MG/1
0.2 TABLET ORAL NIGHTLY
Status: DISCONTINUED | OUTPATIENT
Start: 2023-12-09 | End: 2023-12-13 | Stop reason: HOSPADM

## 2023-12-09 RX ORDER — METHADONE HYDROCHLORIDE 5 MG/5ML
225 SOLUTION ORAL EVERY MORNING
Status: DISCONTINUED | OUTPATIENT
Start: 2023-12-10 | End: 2023-12-09

## 2023-12-09 RX ORDER — KETOROLAC TROMETHAMINE 15 MG/ML
15 INJECTION, SOLUTION INTRAMUSCULAR; INTRAVENOUS ONCE
Status: COMPLETED | OUTPATIENT
Start: 2023-12-09 | End: 2023-12-09

## 2023-12-09 RX ORDER — ONDANSETRON 2 MG/ML
4 INJECTION INTRAMUSCULAR; INTRAVENOUS EVERY 6 HOURS PRN
Status: DISCONTINUED | OUTPATIENT
Start: 2023-12-09 | End: 2023-12-13 | Stop reason: HOSPADM

## 2023-12-09 RX ORDER — ENOXAPARIN SODIUM 100 MG/ML
40 INJECTION SUBCUTANEOUS DAILY
Status: DISCONTINUED | OUTPATIENT
Start: 2023-12-09 | End: 2023-12-13 | Stop reason: HOSPADM

## 2023-12-09 RX ORDER — CLONIDINE HYDROCHLORIDE 0.1 MG/1
0.1 TABLET ORAL EVERY MORNING
COMMUNITY

## 2023-12-09 RX ORDER — METHADONE HYDROCHLORIDE 5 MG/5ML
225 SOLUTION ORAL EVERY MORNING
Status: DISCONTINUED | OUTPATIENT
Start: 2023-12-11 | End: 2023-12-13 | Stop reason: HOSPADM

## 2023-12-09 RX ORDER — ACETAMINOPHEN 325 MG/1
650 TABLET ORAL EVERY 6 HOURS PRN
Status: DISCONTINUED | OUTPATIENT
Start: 2023-12-09 | End: 2023-12-13 | Stop reason: HOSPADM

## 2023-12-09 RX ORDER — ACETAMINOPHEN 650 MG/1
650 SUPPOSITORY RECTAL EVERY 6 HOURS PRN
Status: DISCONTINUED | OUTPATIENT
Start: 2023-12-09 | End: 2023-12-13 | Stop reason: HOSPADM

## 2023-12-09 RX ORDER — 0.9 % SODIUM CHLORIDE 0.9 %
1000 INTRAVENOUS SOLUTION INTRAVENOUS ONCE
Status: COMPLETED | OUTPATIENT
Start: 2023-12-09 | End: 2023-12-09

## 2023-12-09 RX ORDER — MAGNESIUM SULFATE IN WATER 40 MG/ML
2000 INJECTION, SOLUTION INTRAVENOUS PRN
Status: DISCONTINUED | OUTPATIENT
Start: 2023-12-09 | End: 2023-12-13 | Stop reason: HOSPADM

## 2023-12-09 RX ORDER — TRAZODONE HYDROCHLORIDE 50 MG/1
50 TABLET ORAL NIGHTLY
Status: DISCONTINUED | OUTPATIENT
Start: 2023-12-09 | End: 2023-12-13 | Stop reason: HOSPADM

## 2023-12-09 RX ORDER — POLYETHYLENE GLYCOL 3350 17 G/17G
17 POWDER, FOR SOLUTION ORAL DAILY PRN
Status: DISCONTINUED | OUTPATIENT
Start: 2023-12-09 | End: 2023-12-13 | Stop reason: HOSPADM

## 2023-12-09 RX ORDER — POTASSIUM CHLORIDE 20 MEQ/1
40 TABLET, EXTENDED RELEASE ORAL PRN
Status: DISCONTINUED | OUTPATIENT
Start: 2023-12-09 | End: 2023-12-13 | Stop reason: HOSPADM

## 2023-12-09 RX ORDER — SODIUM CHLORIDE 0.9 % (FLUSH) 0.9 %
5-40 SYRINGE (ML) INJECTION EVERY 12 HOURS SCHEDULED
Status: DISCONTINUED | OUTPATIENT
Start: 2023-12-09 | End: 2023-12-13 | Stop reason: HOSPADM

## 2023-12-09 RX ORDER — ACETAMINOPHEN 500 MG
1000 TABLET ORAL ONCE
Status: COMPLETED | OUTPATIENT
Start: 2023-12-09 | End: 2023-12-09

## 2023-12-09 RX ORDER — MIRTAZAPINE 30 MG/1
30 TABLET, FILM COATED ORAL NIGHTLY
COMMUNITY

## 2023-12-09 RX ORDER — METHADONE HYDROCHLORIDE 10 MG/ML
225 CONCENTRATE ORAL ONCE
Status: COMPLETED | OUTPATIENT
Start: 2023-12-10 | End: 2023-12-10

## 2023-12-09 RX ORDER — SODIUM CHLORIDE 0.9 % (FLUSH) 0.9 %
5-40 SYRINGE (ML) INJECTION PRN
Status: DISCONTINUED | OUTPATIENT
Start: 2023-12-09 | End: 2023-12-13 | Stop reason: HOSPADM

## 2023-12-09 RX ORDER — CLONIDINE HYDROCHLORIDE 0.1 MG/1
0.1 TABLET ORAL DAILY
Status: DISCONTINUED | OUTPATIENT
Start: 2023-12-10 | End: 2023-12-13 | Stop reason: HOSPADM

## 2023-12-09 RX ORDER — SODIUM CHLORIDE 9 MG/ML
INJECTION, SOLUTION INTRAVENOUS PRN
Status: DISCONTINUED | OUTPATIENT
Start: 2023-12-09 | End: 2023-12-13 | Stop reason: HOSPADM

## 2023-12-09 RX ORDER — ONDANSETRON 4 MG/1
4 TABLET, ORALLY DISINTEGRATING ORAL EVERY 8 HOURS PRN
Status: DISCONTINUED | OUTPATIENT
Start: 2023-12-09 | End: 2023-12-13 | Stop reason: HOSPADM

## 2023-12-09 RX ORDER — POTASSIUM CHLORIDE 7.45 MG/ML
10 INJECTION INTRAVENOUS PRN
Status: DISCONTINUED | OUTPATIENT
Start: 2023-12-09 | End: 2023-12-13 | Stop reason: HOSPADM

## 2023-12-09 RX ORDER — CLONIDINE HYDROCHLORIDE 0.1 MG/1
0.1 TABLET ORAL DAILY
Status: DISCONTINUED | OUTPATIENT
Start: 2023-12-09 | End: 2023-12-09

## 2023-12-09 RX ADMIN — CLONIDINE HYDROCHLORIDE 0.2 MG: 0.2 TABLET ORAL at 21:24

## 2023-12-09 RX ADMIN — AMLODIPINE BESYLATE 5 MG: 5 TABLET ORAL at 21:24

## 2023-12-09 RX ADMIN — ACETAMINOPHEN 1000 MG: 500 TABLET ORAL at 12:40

## 2023-12-09 RX ADMIN — TRAZODONE HYDROCHLORIDE 50 MG: 50 TABLET ORAL at 22:34

## 2023-12-09 RX ADMIN — SODIUM CHLORIDE 1000 ML: 9 INJECTION, SOLUTION INTRAVENOUS at 12:42

## 2023-12-09 RX ADMIN — CLONAZEPAM 0.5 MG: 0.5 TABLET ORAL at 21:24

## 2023-12-09 RX ADMIN — ENOXAPARIN SODIUM 40 MG: 100 INJECTION SUBCUTANEOUS at 17:17

## 2023-12-09 RX ADMIN — PIPERACILLIN AND TAZOBACTAM 4500 MG: 4; .5 INJECTION, POWDER, LYOPHILIZED, FOR SOLUTION INTRAVENOUS; PARENTERAL at 12:34

## 2023-12-09 RX ADMIN — VANCOMYCIN HYDROCHLORIDE 1500 MG: 1.5 INJECTION, POWDER, LYOPHILIZED, FOR SOLUTION INTRAVENOUS at 13:50

## 2023-12-09 RX ADMIN — SODIUM CHLORIDE: 9 INJECTION, SOLUTION INTRAVENOUS at 17:03

## 2023-12-09 RX ADMIN — MIRTAZAPINE 45 MG: 30 TABLET, ORALLY DISINTEGRATING ORAL at 22:34

## 2023-12-09 RX ADMIN — KETOROLAC TROMETHAMINE 15 MG: 15 INJECTION, SOLUTION INTRAMUSCULAR; INTRAVENOUS at 12:34

## 2023-12-09 ASSESSMENT — PAIN - FUNCTIONAL ASSESSMENT
PAIN_FUNCTIONAL_ASSESSMENT: 0-10
PAIN_FUNCTIONAL_ASSESSMENT: ACTIVITIES ARE NOT PREVENTED

## 2023-12-09 ASSESSMENT — PAIN SCALES - GENERAL
PAINLEVEL_OUTOF10: 10
PAINLEVEL_OUTOF10: 8

## 2023-12-09 ASSESSMENT — PAIN DESCRIPTION - ONSET: ONSET: ON-GOING

## 2023-12-09 ASSESSMENT — PAIN DESCRIPTION - DESCRIPTORS: DESCRIPTORS: ACHING;DISCOMFORT

## 2023-12-09 ASSESSMENT — PAIN DESCRIPTION - LOCATION
LOCATION: ANKLE
LOCATION: ANKLE

## 2023-12-09 ASSESSMENT — PAIN DESCRIPTION - ORIENTATION
ORIENTATION: RIGHT
ORIENTATION: RIGHT

## 2023-12-09 ASSESSMENT — PAIN DESCRIPTION - FREQUENCY: FREQUENCY: CONTINUOUS

## 2023-12-09 ASSESSMENT — PAIN DESCRIPTION - PAIN TYPE: TYPE: ACUTE PAIN

## 2023-12-09 NOTE — H&P
ASSESSMENT & PLAN     ASSESSMENT / PLAN:     IMPRESSION  This is a 41 y.o. male with PMHx significant for IV drug use, chronic to rehab, following methadone clinic, hepatitis C, MRSA carrier, Hx of benzo withdrawal seizure in 2021, WALT, MDD, HTN who presented with chief complaint of worsening right lower extremity wounds x 2, and found to have cellulitis. ID and podiatry on board    Principal Problem:    Cellulitis  Resolved Problems:    * No resolved hospital problems. *     Principal Problem:  Right lower extremity cellulitis/ wounds x2 leg and foot  MRSA carrier  - secondary to IV drug use, 2 weeks ago. - pulse appreciated bilaterally. -X-ray/tibia-fib -negative for soft tissue of ischemia, acute fracture/dislocation, foreign body or osteomyelitis, only soft tissue swelling was noted. - started on Vanco and Zosyn switched to only Vancomycin by ID.  - ID consulted, appreciate recommendations, continue with vancomycin, wet-to-dry dressings with each shift.  - Podiatry consulted, appreciate recommendations  -S/p mechanical debridement, wet to wet dressing, ABD pad, Kerlix and Ace wrap applied.  -Weightbearing as tolerated to bilateral lower extremities  - Blood cultures - no growth so far  - Wound culture sent, follow-up    Hypertension - on clonidine 0.1 in a.m., 0.2 in p.m., resumed    History of IV drug use/IV fentanyl, benzos  - Last used 2 weeks ago  - Per patient he quit, going to rehab for last 2 days  - Following with methadone clinic.  - will confirm Methadone dose and resume  - Encouraged to continue cessation of drug use. Hx of major depressive disorder, anxiety  - per Dispense report, On clonazepam 0.5 3 times daily as needed, mirtazapine 45, trazodone 50   - Trazodone and Mirtazapine resumed      DVT ppx: Lovenox    PT/OT/SW: consulted  Discharge Planning:  consulted. Bobby Howard MD  Internal Medicine Resident, PGY-1  [facility];  [facility],[place] [address]  12/9/2023,

## 2023-12-09 NOTE — ED NOTES
The following labs were labeled with appropriate pt sticker and tubed to lab:     [x] Blue     [x] Lavender   [] on ice  [x] Green/yellow  [x] Green/black [] on ice  [] Tenna Jakob  [] on ice  [] Yellow  [] Red  [] Pink  [] Type/ Screen  [] ABG  [] VBG    [] COVID-19 swab    [] Rapid  [] PCR  [] Flu swab  [] Peds Viral Panel     [] Urine Sample  [] Fecal Sample  [] Pelvic Cultures  [x] Blood Cultures  [] X 2  [] STREP Cultures  [] Wound Cultures       Saji August RN  12/09/23 [id number]

## 2023-12-09 NOTE — PROGRESS NOTES
Patients home dose of methadone collected. Medication give to pharmacy and placed in sealed bag which was labeled with patient identification. Bag signed, dated and timed per writer. Tag given to writer which was placed in patient chart. Writer instructed that once patient is ready for discharge to return label to pharmacy.

## 2023-12-09 NOTE — ED NOTES
Pt transported to [facility] in [facility] by oleg Gilliam  2C nurse notified of patient arrival      Luz Scott  12/09/23 3490

## 2023-12-09 NOTE — ED NOTES
Pt to ED for wound infection on right ankle. Pt has large wound on right ankle, erythematous with purulent drainage. Pt states injected himself with fentanyl 3 weeks ago which caused the wound. Hx of IV drug use, last use was 2 days ago. Pt reports fever and chills at home. Has not taken any motrin or tylenol today. Patient alert and oriented x4, talking in complete sentences. Respirations even and unlabored. Patient placed on continuous cardiac monitoring, BP cuff, and pulse ox. Blood work obtained.  Call light in reach, all needs met at this time     Luz Lind  12/09/23 6567

## 2023-12-10 ENCOUNTER — ANESTHESIA EVENT (OUTPATIENT)
Dept: OPERATING ROOM | Age: 38
DRG: 710 | End: 2023-12-10
Payer: COMMERCIAL

## 2023-12-10 PROBLEM — A49.02 MRSA INFECTION: Status: ACTIVE | Noted: 2023-12-10

## 2023-12-10 PROBLEM — F19.10 IV DRUG ABUSE (HCC): Status: ACTIVE | Noted: 2023-12-10

## 2023-12-10 PROBLEM — S81.801A MULTIPLE OPEN WOUNDS OF RIGHT LOWER EXTREMITY: Status: ACTIVE | Noted: 2023-12-10

## 2023-12-10 PROBLEM — L03.115 CELLULITIS OF RIGHT LEG: Status: ACTIVE | Noted: 2023-12-10

## 2023-12-10 LAB
ALBUMIN SERPL-MCNC: 3.8 G/DL (ref 3.5–5.2)
ALBUMIN/GLOB SERPL: 1.1 {RATIO} (ref 1–2.5)
ALP SERPL-CCNC: 111 U/L (ref 40–129)
ALT SERPL-CCNC: 8 U/L (ref 5–41)
ANION GAP SERPL CALCULATED.3IONS-SCNC: 9 MMOL/L (ref 9–17)
AST SERPL-CCNC: 14 U/L
BASOPHILS # BLD: 0.06 K/UL (ref 0–0.2)
BASOPHILS NFR BLD: 1 % (ref 0–2)
BILIRUB DIRECT SERPL-MCNC: 0.1 MG/DL
BILIRUB INDIRECT SERPL-MCNC: 0.3 MG/DL (ref 0–1)
BILIRUB SERPL-MCNC: 0.4 MG/DL (ref 0.3–1.2)
BUN SERPL-MCNC: 9 MG/DL (ref 6–20)
CALCIUM SERPL-MCNC: 9.4 MG/DL (ref 8.6–10.4)
CHLORIDE SERPL-SCNC: 105 MMOL/L (ref 98–107)
CO2 SERPL-SCNC: 24 MMOL/L (ref 20–31)
CREAT SERPL-MCNC: 0.8 MG/DL (ref 0.7–1.2)
EOSINOPHIL # BLD: 0.13 K/UL (ref 0–0.44)
EOSINOPHILS RELATIVE PERCENT: 2 % (ref 1–4)
ERYTHROCYTE [DISTWIDTH] IN BLOOD BY AUTOMATED COUNT: 12.6 % (ref 11.8–14.4)
GFR SERPL CREATININE-BSD FRML MDRD: >60 ML/MIN/1.73M2
GLUCOSE SERPL-MCNC: 93 MG/DL (ref 70–99)
HCT VFR BLD AUTO: 35.9 % (ref 40.7–50.3)
HGB BLD-MCNC: 11.7 G/DL (ref 13–17)
IMM GRANULOCYTES # BLD AUTO: <0.03 K/UL (ref 0–0.3)
IMM GRANULOCYTES NFR BLD: 0 %
LACTIC ACID, WHOLE BLOOD: 0.9 MMOL/L (ref 0.7–2.1)
LYMPHOCYTES NFR BLD: 1.94 K/UL (ref 1.1–3.7)
LYMPHOCYTES RELATIVE PERCENT: 26 % (ref 24–43)
MCH RBC QN AUTO: 29 PG (ref 25.2–33.5)
MCHC RBC AUTO-ENTMCNC: 32.6 G/DL (ref 28.4–34.8)
MCV RBC AUTO: 88.9 FL (ref 82.6–102.9)
MICROORGANISM SPEC CULT: NO GROWTH
MONOCYTES NFR BLD: 0.44 K/UL (ref 0.1–1.2)
MONOCYTES NFR BLD: 6 % (ref 3–12)
MRSA, DNA, NASAL: ABNORMAL
NEUTROPHILS NFR BLD: 65 % (ref 36–65)
NEUTS SEG NFR BLD: 4.84 K/UL (ref 1.5–8.1)
NRBC BLD-RTO: 0 PER 100 WBC
PLATELET # BLD AUTO: 340 K/UL (ref 138–453)
PMV BLD AUTO: 9.2 FL (ref 8.1–13.5)
POTASSIUM SERPL-SCNC: 4 MMOL/L (ref 3.7–5.3)
PROT SERPL-MCNC: 7.4 G/DL (ref 6.4–8.3)
RBC # BLD AUTO: 4.04 M/UL (ref 4.21–5.77)
SODIUM SERPL-SCNC: 138 MMOL/L (ref 135–144)
SPECIMEN DESCRIPTION: ABNORMAL
SPECIMEN DESCRIPTION: NORMAL
WBC OTHER # BLD: 7.4 K/UL (ref 3.5–11.3)

## 2023-12-10 PROCEDURE — 99232 SBSQ HOSP IP/OBS MODERATE 35: CPT | Performed by: INTERNAL MEDICINE

## 2023-12-10 PROCEDURE — 6370000000 HC RX 637 (ALT 250 FOR IP)

## 2023-12-10 PROCEDURE — 83605 ASSAY OF LACTIC ACID: CPT

## 2023-12-10 PROCEDURE — 85025 COMPLETE CBC W/AUTO DIFF WBC: CPT

## 2023-12-10 PROCEDURE — 6360000002 HC RX W HCPCS: Performed by: INTERNAL MEDICINE

## 2023-12-10 PROCEDURE — 80048 BASIC METABOLIC PNL TOTAL CA: CPT

## 2023-12-10 PROCEDURE — 36415 COLL VENOUS BLD VENIPUNCTURE: CPT

## 2023-12-10 PROCEDURE — 2580000003 HC RX 258: Performed by: INTERNAL MEDICINE

## 2023-12-10 PROCEDURE — 80076 HEPATIC FUNCTION PANEL: CPT

## 2023-12-10 PROCEDURE — 6360000002 HC RX W HCPCS

## 2023-12-10 PROCEDURE — 1200000000 HC SEMI PRIVATE

## 2023-12-10 PROCEDURE — 2580000003 HC RX 258

## 2023-12-10 PROCEDURE — 99231 SBSQ HOSP IP/OBS SF/LOW 25: CPT | Performed by: PODIATRIST

## 2023-12-10 RX ADMIN — CLONAZEPAM 0.5 MG: 0.5 TABLET ORAL at 22:54

## 2023-12-10 RX ADMIN — SODIUM CHLORIDE, PRESERVATIVE FREE 10 ML: 5 INJECTION INTRAVENOUS at 01:37

## 2023-12-10 RX ADMIN — CLONAZEPAM 0.5 MG: 0.5 TABLET ORAL at 17:11

## 2023-12-10 RX ADMIN — AMLODIPINE BESYLATE 5 MG: 5 TABLET ORAL at 08:36

## 2023-12-10 RX ADMIN — MIRTAZAPINE 45 MG: 30 TABLET, ORALLY DISINTEGRATING ORAL at 22:54

## 2023-12-10 RX ADMIN — VANCOMYCIN HYDROCHLORIDE 1500 MG: 1.5 INJECTION, POWDER, LYOPHILIZED, FOR SOLUTION INTRAVENOUS at 01:37

## 2023-12-10 RX ADMIN — SODIUM CHLORIDE, PRESERVATIVE FREE 10 ML: 5 INJECTION INTRAVENOUS at 22:55

## 2023-12-10 RX ADMIN — VANCOMYCIN HYDROCHLORIDE 1500 MG: 1.5 INJECTION, POWDER, LYOPHILIZED, FOR SOLUTION INTRAVENOUS at 14:14

## 2023-12-10 RX ADMIN — METHADONE HYDROCHLORIDE 225 MG: 10 CONCENTRATE ORAL at 06:00

## 2023-12-10 RX ADMIN — TRAZODONE HYDROCHLORIDE 50 MG: 50 TABLET ORAL at 22:54

## 2023-12-10 RX ADMIN — ENOXAPARIN SODIUM 40 MG: 100 INJECTION SUBCUTANEOUS at 08:36

## 2023-12-10 RX ADMIN — CLONIDINE HYDROCHLORIDE 0.2 MG: 0.2 TABLET ORAL at 22:54

## 2023-12-10 RX ADMIN — CLONIDINE HYDROCHLORIDE 0.1 MG: 0.1 TABLET ORAL at 08:37

## 2023-12-10 RX ADMIN — CLONAZEPAM 0.5 MG: 0.5 TABLET ORAL at 06:03

## 2023-12-10 NOTE — PLAN OF CARE
Problem: Discharge Planning  Goal: Discharge to home or other facility with appropriate resources  12/10/2023 1619 by Mariana Anders RN  Outcome: Progressing  12/10/2023 0446 by Ricky Gentile RN  Outcome: Progressing     Problem: Pain  Goal: Verbalizes/displays adequate comfort level or baseline comfort level  12/10/2023 1619 by Marinaa Anders RN  Outcome: Progressing  12/10/2023 0446 by Ricky Gentile RN  Outcome: Progressing     Problem: Skin/Tissue Integrity  Goal: Absence of new skin breakdown  Description: 1. Monitor for areas of redness and/or skin breakdown  2. Assess vascular access sites hourly  3. Every 4-6 hours minimum:  Change oxygen saturation probe site  4. Every 4-6 hours:  If on nasal continuous positive airway pressure, respiratory therapy assess nares and determine need for appliance change or resting period.   12/10/2023 1619 by Mariana Anders RN  Outcome: Progressing  12/10/2023 0446 by Ricky Gentile RN  Outcome: Progressing     Problem: Safety - Adult  Goal: Free from fall injury  12/10/2023 1619 by Mariana Anders RN  Outcome: Progressing  12/10/2023 0446 by Ricky Gentile RN  Outcome: Progressing     Problem: ABCDS Injury Assessment  Goal: Absence of physical injury  12/10/2023 1619 by Mariana Anders RN  Outcome: Progressing  12/10/2023 0446 by Ricky Gentile RN  Outcome: Progressing

## 2023-12-10 NOTE — PROGRESS NOTES
Physical Therapy        Physical Therapy Cancel Note      DATE: 12/10/2023    NAME: Noel King  MRN: [id number]   : 1985      Patient not seen this date for Physical Therapy due to:    Patient independent with functional mobility. Will defer PT evaluation at this time. Please reorder PT if future needs arise. Spoke with pt.  Pt denies acute PT concerns, agreeable to deferral.      Electronically signed by Hector Archuleta PT on 12/10/2023 at 11:43 AM

## 2023-12-10 NOTE — PROGRESS NOTES
stable    Patient feels better  No complaints  No new issues per RN    Medications reviewed  Vancomycin   Wound care with wet to dry dressings    I&D and graft planned for tomorrow     Labs, X rays reviewed: 12/10/2023    BUN: 22-->9  Cr: 11-->0.8    WBC: 7.1-->7.4  Hb: 13.1-->11.7  Plat:  383-->340    CRP:     Cultures:  Urine:    Blood:  2023 No growth   Sputum :    Wound:  2023 S aureus  MRSA Nares:  23: Positive    Imagin2023 right leg      2023 left leg and foot      Discussed diagnosis, planned studiesand treatment plans with patient, RN, Podiatry. I have personally reviewed the past medical history, past surgical history, medications, social history, and family history, and I have updated the database accordingly. Past Medical History:     Past Medical History:   Diagnosis Date    Asthma     MRSA (methicillin resistant staph aureus) culture positive 2014    leg    Seizures (HCC)     Viral hepatitis C        Past Surgical  History:   No past surgical history on file.     Medications:      sodium chloride flush  5-40 mL IntraVENous 2 times per day    enoxaparin  40 mg SubCUTAneous Daily    vancomycin (VANCOCIN) intermittent dosing (placeholder)   Other RX Placeholder    vancomycin  1,500 mg IntraVENous Q12H    mirtazapine  45 mg Oral Nightly    traZODone  50 mg Oral Nightly    cloNIDine  0.2 mg Oral Nightly    cloNIDine  0.1 mg Oral Daily    amLODIPine  5 mg Oral Daily    [START ON 2023] methadone  225 mg Oral QAM       Social History:     Social History     Socioeconomic History    Marital status: Single     Spouse name: Not on file    Number of children: Not on file    Years of education: Not on file    Highest education level: Not on file   Occupational History    Not on file   Tobacco Use    Smoking status: Every Day    Smokeless tobacco: Never    Tobacco comments:     5-6 cigarettes per day   Vaping Use    Vaping Use: Every day   Substance and Sexual Activity Office: [phone]

## 2023-12-11 ENCOUNTER — ANESTHESIA (OUTPATIENT)
Dept: OPERATING ROOM | Age: 38
DRG: 710 | End: 2023-12-11
Payer: COMMERCIAL

## 2023-12-11 PROBLEM — L97.922 ULCERS OF BOTH LOWER EXTREMITIES WITH FAT LAYER EXPOSED (HCC): Status: ACTIVE | Noted: 2023-12-11

## 2023-12-11 PROBLEM — L03.115 CELLULITIS OF LEG, RIGHT: Status: ACTIVE | Noted: 2023-12-11

## 2023-12-11 PROBLEM — L97.913: Status: ACTIVE | Noted: 2023-12-11

## 2023-12-11 PROBLEM — L03.116 CELLULITIS OF LEG, LEFT: Status: ACTIVE | Noted: 2023-12-11

## 2023-12-11 PROBLEM — L97.912 ULCERS OF BOTH LOWER EXTREMITIES WITH FAT LAYER EXPOSED (HCC): Status: ACTIVE | Noted: 2023-12-11

## 2023-12-11 LAB
ANION GAP SERPL CALCULATED.3IONS-SCNC: 12 MMOL/L (ref 9–17)
BASOPHILS # BLD: 0.06 K/UL (ref 0–0.2)
BASOPHILS NFR BLD: 1 % (ref 0–2)
BUN SERPL-MCNC: 7 MG/DL (ref 6–20)
CALCIUM SERPL-MCNC: 9.5 MG/DL (ref 8.6–10.4)
CHLORIDE SERPL-SCNC: 106 MMOL/L (ref 98–107)
CO2 SERPL-SCNC: 19 MMOL/L (ref 20–31)
CREAT SERPL-MCNC: 0.8 MG/DL (ref 0.7–1.2)
EOSINOPHIL # BLD: 0.14 K/UL (ref 0–0.44)
EOSINOPHILS RELATIVE PERCENT: 2 % (ref 1–4)
ERYTHROCYTE [DISTWIDTH] IN BLOOD BY AUTOMATED COUNT: 12.7 % (ref 11.8–14.4)
GFR SERPL CREATININE-BSD FRML MDRD: >60 ML/MIN/1.73M2
GLUCOSE SERPL-MCNC: 96 MG/DL (ref 70–99)
HCT VFR BLD AUTO: 37.2 % (ref 40.7–50.3)
HGB BLD-MCNC: 12.1 G/DL (ref 13–17)
IMM GRANULOCYTES # BLD AUTO: 0.03 K/UL (ref 0–0.3)
IMM GRANULOCYTES NFR BLD: 0 %
LYMPHOCYTES NFR BLD: 2.53 K/UL (ref 1.1–3.7)
LYMPHOCYTES RELATIVE PERCENT: 34 % (ref 24–43)
MCH RBC QN AUTO: 29.1 PG (ref 25.2–33.5)
MCHC RBC AUTO-ENTMCNC: 32.5 G/DL (ref 28.4–34.8)
MCV RBC AUTO: 89.4 FL (ref 82.6–102.9)
MONOCYTES NFR BLD: 0.59 K/UL (ref 0.1–1.2)
MONOCYTES NFR BLD: 8 % (ref 3–12)
NEUTROPHILS NFR BLD: 55 % (ref 36–65)
NEUTS SEG NFR BLD: 4.14 K/UL (ref 1.5–8.1)
NRBC BLD-RTO: 0 PER 100 WBC
PLATELET # BLD AUTO: 361 K/UL (ref 138–453)
PMV BLD AUTO: 9.4 FL (ref 8.1–13.5)
POTASSIUM SERPL-SCNC: 4.2 MMOL/L (ref 3.7–5.3)
RBC # BLD AUTO: 4.16 M/UL (ref 4.21–5.77)
SODIUM SERPL-SCNC: 137 MMOL/L (ref 135–144)
VANCOMYCIN SERPL-MCNC: 37 UG/ML
WBC OTHER # BLD: 7.5 K/UL (ref 3.5–11.3)

## 2023-12-11 PROCEDURE — 80048 BASIC METABOLIC PNL TOTAL CA: CPT

## 2023-12-11 PROCEDURE — 2580000003 HC RX 258

## 2023-12-11 PROCEDURE — 0JBQ0ZZ EXCISION OF RIGHT FOOT SUBCUTANEOUS TISSUE AND FASCIA, OPEN APPROACH: ICD-10-PCS | Performed by: PODIATRIST

## 2023-12-11 PROCEDURE — 99232 SBSQ HOSP IP/OBS MODERATE 35: CPT | Performed by: INTERNAL MEDICINE

## 2023-12-11 PROCEDURE — 2580000003 HC RX 258: Performed by: NURSE ANESTHETIST, CERTIFIED REGISTERED

## 2023-12-11 PROCEDURE — 2720000010 HC SURG SUPPLY STERILE: Performed by: PODIATRIST

## 2023-12-11 PROCEDURE — 15002 WOUND PREP TRK/ARM/LEG: CPT | Performed by: PODIATRIST

## 2023-12-11 PROCEDURE — 6360000002 HC RX W HCPCS: Performed by: PODIATRIST

## 2023-12-11 PROCEDURE — 1200000000 HC SEMI PRIVATE

## 2023-12-11 PROCEDURE — 3700000000 HC ANESTHESIA ATTENDED CARE: Performed by: PODIATRIST

## 2023-12-11 PROCEDURE — 6360000002 HC RX W HCPCS: Performed by: INTERNAL MEDICINE

## 2023-12-11 PROCEDURE — 6360000002 HC RX W HCPCS: Performed by: ANESTHESIOLOGY

## 2023-12-11 PROCEDURE — 2580000003 HC RX 258: Performed by: INTERNAL MEDICINE

## 2023-12-11 PROCEDURE — 3600000012 HC SURGERY LEVEL 2 ADDTL 15MIN: Performed by: PODIATRIST

## 2023-12-11 PROCEDURE — 0HRMXK3 REPLACEMENT OF RIGHT FOOT SKIN WITH NONAUTOLOGOUS TISSUE SUBSTITUTE, FULL THICKNESS, EXTERNAL APPROACH: ICD-10-PCS | Performed by: PODIATRIST

## 2023-12-11 PROCEDURE — 2580000003 HC RX 258: Performed by: PODIATRIST

## 2023-12-11 PROCEDURE — 97605 NEG PRS WND THER DME<=50SQCM: CPT | Performed by: PODIATRIST

## 2023-12-11 PROCEDURE — 0HRKXK3 REPLACEMENT OF RIGHT LOWER LEG SKIN WITH NONAUTOLOGOUS TISSUE SUBSTITUTE, FULL THICKNESS, EXTERNAL APPROACH: ICD-10-PCS | Performed by: PODIATRIST

## 2023-12-11 PROCEDURE — 15271 SKIN SUB GRAFT TRNK/ARM/LEG: CPT | Performed by: PODIATRIST

## 2023-12-11 PROCEDURE — 6370000000 HC RX 637 (ALT 250 FOR IP)

## 2023-12-11 PROCEDURE — 2709999900 HC NON-CHARGEABLE SUPPLY: Performed by: PODIATRIST

## 2023-12-11 PROCEDURE — 2500000003 HC RX 250 WO HCPCS: Performed by: PODIATRIST

## 2023-12-11 PROCEDURE — 7100000001 HC PACU RECOVERY - ADDTL 15 MIN: Performed by: PODIATRIST

## 2023-12-11 PROCEDURE — 85025 COMPLETE CBC W/AUTO DIFF WBC: CPT

## 2023-12-11 PROCEDURE — 6360000002 HC RX W HCPCS

## 2023-12-11 PROCEDURE — 3600000002 HC SURGERY LEVEL 2 BASE: Performed by: PODIATRIST

## 2023-12-11 PROCEDURE — 6360000002 HC RX W HCPCS: Performed by: NURSE ANESTHETIST, CERTIFIED REGISTERED

## 2023-12-11 PROCEDURE — 36415 COLL VENOUS BLD VENIPUNCTURE: CPT

## 2023-12-11 PROCEDURE — 7100000000 HC PACU RECOVERY - FIRST 15 MIN: Performed by: PODIATRIST

## 2023-12-11 PROCEDURE — 3700000001 HC ADD 15 MINUTES (ANESTHESIA): Performed by: PODIATRIST

## 2023-12-11 PROCEDURE — 80202 ASSAY OF VANCOMYCIN: CPT

## 2023-12-11 PROCEDURE — 2W1QX6Z COMPRESSION OF RIGHT LOWER LEG USING PRESSURE DRESSING: ICD-10-PCS | Performed by: PODIATRIST

## 2023-12-11 PROCEDURE — 0JBN0ZZ EXCISION OF RIGHT LOWER LEG SUBCUTANEOUS TISSUE AND FASCIA, OPEN APPROACH: ICD-10-PCS | Performed by: PODIATRIST

## 2023-12-11 PROCEDURE — 2500000003 HC RX 250 WO HCPCS: Performed by: NURSE ANESTHETIST, CERTIFIED REGISTERED

## 2023-12-11 DEVICE — INTEGRA® MESHED BILAYER WOUND MATRIX 4 IN*5 IN (10 CM*12.5 CM)
Type: IMPLANTABLE DEVICE | Site: FOOT | Status: FUNCTIONAL
Brand: INTEGRA®

## 2023-12-11 DEVICE — DRESSING WND MICRONIZED PARTIC 500 MG MATRISTEM MICROMATRIX: Type: IMPLANTABLE DEVICE | Site: FOOT | Status: FUNCTIONAL

## 2023-12-11 RX ORDER — MAGNESIUM HYDROXIDE 1200 MG/15ML
LIQUID ORAL CONTINUOUS PRN
Status: DISCONTINUED | OUTPATIENT
Start: 2023-12-11 | End: 2023-12-11 | Stop reason: HOSPADM

## 2023-12-11 RX ORDER — TRAZODONE HYDROCHLORIDE 50 MG/1
50 TABLET ORAL
COMMUNITY

## 2023-12-11 RX ORDER — BUPIVACAINE HYDROCHLORIDE 5 MG/ML
INJECTION, SOLUTION EPIDURAL; INTRACAUDAL PRN
Status: DISCONTINUED | OUTPATIENT
Start: 2023-12-11 | End: 2023-12-11 | Stop reason: ALTCHOICE

## 2023-12-11 RX ORDER — CLONAZEPAM 0.5 MG/1
0.5 TABLET ORAL 3 TIMES DAILY PRN
COMMUNITY

## 2023-12-11 RX ORDER — CLONIDINE HYDROCHLORIDE 0.2 MG/1
0.2 TABLET ORAL EVERY EVENING
COMMUNITY

## 2023-12-11 RX ORDER — MIDAZOLAM HYDROCHLORIDE 1 MG/ML
INJECTION INTRAMUSCULAR; INTRAVENOUS PRN
Status: DISCONTINUED | OUTPATIENT
Start: 2023-12-11 | End: 2023-12-11 | Stop reason: SDUPTHER

## 2023-12-11 RX ORDER — SODIUM CHLORIDE 0.9 % (FLUSH) 0.9 %
5-40 SYRINGE (ML) INJECTION EVERY 12 HOURS SCHEDULED
Status: DISCONTINUED | OUTPATIENT
Start: 2023-12-11 | End: 2023-12-11 | Stop reason: HOSPADM

## 2023-12-11 RX ORDER — PROPOFOL 10 MG/ML
INJECTION, EMULSION INTRAVENOUS PRN
Status: DISCONTINUED | OUTPATIENT
Start: 2023-12-11 | End: 2023-12-11 | Stop reason: SDUPTHER

## 2023-12-11 RX ORDER — ROCURONIUM BROMIDE 10 MG/ML
INJECTION, SOLUTION INTRAVENOUS PRN
Status: DISCONTINUED | OUTPATIENT
Start: 2023-12-11 | End: 2023-12-11 | Stop reason: SDUPTHER

## 2023-12-11 RX ORDER — LIDOCAINE HYDROCHLORIDE 10 MG/ML
INJECTION, SOLUTION EPIDURAL; INFILTRATION; INTRACAUDAL; PERINEURAL PRN
Status: DISCONTINUED | OUTPATIENT
Start: 2023-12-11 | End: 2023-12-11 | Stop reason: SDUPTHER

## 2023-12-11 RX ORDER — SODIUM CHLORIDE 9 MG/ML
INJECTION, SOLUTION INTRAVENOUS PRN
Status: DISCONTINUED | OUTPATIENT
Start: 2023-12-11 | End: 2023-12-11 | Stop reason: HOSPADM

## 2023-12-11 RX ORDER — KETOROLAC TROMETHAMINE 30 MG/ML
30 INJECTION, SOLUTION INTRAMUSCULAR; INTRAVENOUS EVERY 6 HOURS PRN
Status: DISCONTINUED | OUTPATIENT
Start: 2023-12-11 | End: 2023-12-11

## 2023-12-11 RX ORDER — LIDOCAINE HYDROCHLORIDE 10 MG/ML
INJECTION, SOLUTION EPIDURAL; INFILTRATION; INTRACAUDAL; PERINEURAL PRN
Status: DISCONTINUED | OUTPATIENT
Start: 2023-12-11 | End: 2023-12-11 | Stop reason: ALTCHOICE

## 2023-12-11 RX ORDER — SODIUM CHLORIDE 0.9 % (FLUSH) 0.9 %
5-40 SYRINGE (ML) INJECTION PRN
Status: DISCONTINUED | OUTPATIENT
Start: 2023-12-11 | End: 2023-12-11 | Stop reason: HOSPADM

## 2023-12-11 RX ORDER — KETOROLAC TROMETHAMINE 30 MG/ML
30 INJECTION, SOLUTION INTRAMUSCULAR; INTRAVENOUS EVERY 8 HOURS PRN
Status: DISCONTINUED | OUTPATIENT
Start: 2023-12-11 | End: 2023-12-13 | Stop reason: HOSPADM

## 2023-12-11 RX ORDER — GABAPENTIN 300 MG/1
300 CAPSULE ORAL 3 TIMES DAILY
COMMUNITY

## 2023-12-11 RX ORDER — PHENYLEPHRINE HCL IN 0.9% NACL 1 MG/10 ML
SYRINGE (ML) INTRAVENOUS PRN
Status: DISCONTINUED | OUTPATIENT
Start: 2023-12-11 | End: 2023-12-11 | Stop reason: SDUPTHER

## 2023-12-11 RX ORDER — SODIUM CHLORIDE, SODIUM LACTATE, POTASSIUM CHLORIDE, CALCIUM CHLORIDE 600; 310; 30; 20 MG/100ML; MG/100ML; MG/100ML; MG/100ML
INJECTION, SOLUTION INTRAVENOUS CONTINUOUS PRN
Status: DISCONTINUED | OUTPATIENT
Start: 2023-12-11 | End: 2023-12-11 | Stop reason: SDUPTHER

## 2023-12-11 RX ORDER — AMLODIPINE BESYLATE 5 MG/1
5 TABLET ORAL DAILY
Status: DISCONTINUED | OUTPATIENT
Start: 2023-12-12 | End: 2023-12-13

## 2023-12-11 RX ORDER — FENTANYL CITRATE 50 UG/ML
INJECTION, SOLUTION INTRAMUSCULAR; INTRAVENOUS PRN
Status: DISCONTINUED | OUTPATIENT
Start: 2023-12-11 | End: 2023-12-11 | Stop reason: SDUPTHER

## 2023-12-11 RX ADMIN — SODIUM CHLORIDE, PRESERVATIVE FREE 10 ML: 5 INJECTION INTRAVENOUS at 23:25

## 2023-12-11 RX ADMIN — SUGAMMADEX 200 MG: 100 INJECTION, SOLUTION INTRAVENOUS at 13:09

## 2023-12-11 RX ADMIN — ROCURONIUM BROMIDE 50 MG: 10 INJECTION, SOLUTION INTRAVENOUS at 12:13

## 2023-12-11 RX ADMIN — LIDOCAINE HYDROCHLORIDE 50 MG: 10 INJECTION, SOLUTION EPIDURAL; INFILTRATION; INTRACAUDAL; PERINEURAL at 12:13

## 2023-12-11 RX ADMIN — CLONAZEPAM 0.5 MG: 0.5 TABLET ORAL at 16:37

## 2023-12-11 RX ADMIN — HYDROMORPHONE HYDROCHLORIDE 0.5 MG: 1 INJECTION, SOLUTION INTRAMUSCULAR; INTRAVENOUS; SUBCUTANEOUS at 13:36

## 2023-12-11 RX ADMIN — CLONIDINE HYDROCHLORIDE 0.2 MG: 0.2 TABLET ORAL at 23:22

## 2023-12-11 RX ADMIN — METHADONE HYDROCHLORIDE 225 MG: 5 SOLUTION ORAL at 05:58

## 2023-12-11 RX ADMIN — CLONAZEPAM 0.5 MG: 0.5 TABLET ORAL at 23:23

## 2023-12-11 RX ADMIN — Medication 100 MCG: at 12:58

## 2023-12-11 RX ADMIN — FENTANYL CITRATE 100 MCG: 50 INJECTION, SOLUTION INTRAMUSCULAR; INTRAVENOUS at 12:13

## 2023-12-11 RX ADMIN — HYDROMORPHONE HYDROCHLORIDE 0.5 MG: 1 INJECTION, SOLUTION INTRAMUSCULAR; INTRAVENOUS; SUBCUTANEOUS at 13:44

## 2023-12-11 RX ADMIN — MIDAZOLAM 2 MG: 1 INJECTION INTRAMUSCULAR; INTRAVENOUS at 12:07

## 2023-12-11 RX ADMIN — KETOROLAC TROMETHAMINE 30 MG: 30 INJECTION, SOLUTION INTRAMUSCULAR at 16:36

## 2023-12-11 RX ADMIN — PROPOFOL 200 MG: 10 INJECTION, EMULSION INTRAVENOUS at 12:13

## 2023-12-11 RX ADMIN — TRAZODONE HYDROCHLORIDE 50 MG: 50 TABLET ORAL at 23:23

## 2023-12-11 RX ADMIN — Medication 100 MCG: at 13:05

## 2023-12-11 RX ADMIN — ACETAMINOPHEN, ASPIRIN, CAFFEINE 1 TABLET: 250; 250; 65 TABLET, FILM COATED ORAL at 23:24

## 2023-12-11 RX ADMIN — CLONAZEPAM 0.5 MG: 0.5 TABLET ORAL at 06:02

## 2023-12-11 RX ADMIN — SODIUM CHLORIDE, POTASSIUM CHLORIDE, SODIUM LACTATE AND CALCIUM CHLORIDE: 600; 310; 30; 20 INJECTION, SOLUTION INTRAVENOUS at 12:07

## 2023-12-11 RX ADMIN — MIRTAZAPINE 45 MG: 30 TABLET, ORALLY DISINTEGRATING ORAL at 23:22

## 2023-12-11 RX ADMIN — SODIUM CHLORIDE, PRESERVATIVE FREE 10 ML: 5 INJECTION INTRAVENOUS at 09:37

## 2023-12-11 RX ADMIN — VANCOMYCIN HYDROCHLORIDE 1500 MG: 1.5 INJECTION, POWDER, LYOPHILIZED, FOR SOLUTION INTRAVENOUS at 02:12

## 2023-12-11 ASSESSMENT — PAIN DESCRIPTION - DESCRIPTORS
DESCRIPTORS: THROBBING
DESCRIPTORS: ACHING;DISCOMFORT
DESCRIPTORS: ACHING;BURNING

## 2023-12-11 ASSESSMENT — PAIN DESCRIPTION - FREQUENCY
FREQUENCY: CONTINUOUS

## 2023-12-11 ASSESSMENT — PAIN DESCRIPTION - PAIN TYPE
TYPE: ACUTE PAIN

## 2023-12-11 ASSESSMENT — PAIN SCALES - GENERAL
PAINLEVEL_OUTOF10: 7
PAINLEVEL_OUTOF10: 8
PAINLEVEL_OUTOF10: 6
PAINLEVEL_OUTOF10: 10
PAINLEVEL_OUTOF10: 8
PAINLEVEL_OUTOF10: 5

## 2023-12-11 ASSESSMENT — PAIN DESCRIPTION - ORIENTATION
ORIENTATION: RIGHT;LOWER
ORIENTATION: RIGHT
ORIENTATION: RIGHT;LOWER

## 2023-12-11 ASSESSMENT — PAIN DESCRIPTION - LOCATION
LOCATION: ANKLE
LOCATION: HEAD
LOCATION: ANKLE
LOCATION: ANKLE

## 2023-12-11 ASSESSMENT — PAIN - FUNCTIONAL ASSESSMENT
PAIN_FUNCTIONAL_ASSESSMENT: PREVENTS OR INTERFERES WITH ALL ACTIVE AND SOME PASSIVE ACTIVITIES
PAIN_FUNCTIONAL_ASSESSMENT: NONE - DENIES PAIN
PAIN_FUNCTIONAL_ASSESSMENT: PREVENTS OR INTERFERES WITH ALL ACTIVE AND SOME PASSIVE ACTIVITIES

## 2023-12-11 ASSESSMENT — LIFESTYLE VARIABLES: SMOKING_STATUS: 1

## 2023-12-11 NOTE — ANESTHESIA POSTPROCEDURE EVALUATION
Department of Anesthesiology  Postprocedure Note    Patient: Osvaldo Minor  MRN: [id number]  YOB: 1985  Date of evaluation: 12/11/2023      Procedure Summary       Date: 12/11/23 Room / Location: [address]    Anesthesia Start: 1207 Anesthesia Stop: 1326    Procedure: LOWER EXTREMITY I&D AND APPLICATION OF GRAFT AND WOUND VAC PLACEMENT (Right: Leg Lower) Diagnosis:       Osteomyelitis of right foot, unspecified type ([address])      (Osteomyelitis of right foot, unspecified type ([address]) [M86.9])    Surgeons: Russell Carmen DPM Responsible Provider: Roxanne Smith MD    Anesthesia Type: general ASA Status: 3            Anesthesia Type: No value filed.     Jaqueline Phase I: Jaqueline Score: 10    Jaqueline Phase II:        Anesthesia Post Evaluation    Patient location during evaluation: PACU  Patient participation: complete - patient participated  Level of consciousness: awake and alert  Pain score: 2  Airway patency: patent  Nausea & Vomiting: no nausea and no vomiting  Complications: no  Cardiovascular status: hemodynamically stable  Respiratory status: acceptable  Hydration status: euvolemic  Pain management: adequate

## 2023-12-11 NOTE — OP NOTE
PODIATRY OP NOTE    PATIENT NAME: Joe Olivarez DATE: 1985  -  41 y.o. male  MRN: [id number]  DATE: 12/11/2023  BILLING #: [id number]    Surgeon(s):  Yehuda Ruggiero DPM     ASSISTANTS: Maritza Beasley DPM     PRE-OP DIAGNOSIS:   Cellulitis, right lower extremity  Infected wounds, right lower extremity  Ulceration right ankle down to and including fascia, tendon, structure  IV drug abuse    POST-OP DIAGNOSIS: Same as above. PROCEDURE:   Excisional debridement of infected ulcerations, Wound bed prep, right leg  Application of skin graft substitute, right leg  Application of wound vac, right leg    ANESTHESIA: General    HEMOSTASIS: None needed    ESTIMATED BLOOD LOSS: Less than 10cc. MATERIALS:   Implant Name Type Inv. Item Serial No.  Lot No. LRB No. Used Action   DRESSING WND MICRONIZED PARTIC 500 MG MATRISTEM MICROMATRIX - [id number]  DRESSING WND MICRONIZED PARTIC 500 MG MATRISTEM MICROMATRIX [id number] [employer]- [id number] Right 1 Implanted   DRESSING BIO [id number] BOV TEND CLLGN GLYCOSAMINOGLYCAN - [id number]  DRESSING BIO [id number] BOV TEND CLLGN GLYCOSAMINOGLYCAN [id number] [employer] [id number] Right 1 Implanted       INJECTABLES: 17cc of 1:1 mix of 0.5% marcaine plain and 1% lidocaine plain    SPECIMEN:   * No specimens in log *    COMPLICATIONS: None    FINDINGS: No purulent drainage encountered during debridement. Fibrotic base with central thrombosed/boggy tissue. Does not probe to bone. INDICATIONS FOR PROCEDURE:  Patient is a 41year-old  male well known to Dr. Jagruti Champion with a chief complaint of chronic nonhealing wound to the anterior aspect of the right lower extremity. Patient was recently admitted to [facility] for infected ulcerations to the right lower extremity related to intravenous drug abuse. The patient has elected to undergo surgical treatment.  In pre-op all risks and rewards of the aforementioned procedure were discussed at length prior to the patient signing the consent form which was witnessed by a nurse and placed in his chart. The right foot was marked, labs and images reviewed, NPO status confirmed. No guarantees were given or implied. PROCEDURE IN DETAIL: The patient was brought into the operating room and placed on the operating table in the lateral/supine position. A timeout was performed confirming the patient identity, correct site, correct procedure, allergies, and preoperative antibiotics. After adequate sedation by Anesthesia, a local block of 17cc of 1:1 mixture of 1% lidocaine plain and 0.5% Marcaine plain was injected. The surgical site was then scrubbed, prepped, and draped in the usual aseptic manner. Attention was directed to the anterior aspect right lower extremity where a wound with exposed subcutaneous tissue was noted. It did not probe to bone. The wound had a fibrotic base with central thrombosed/necrotic/boggy tissue. No purulence was expressed. No abscess was appreciated during the procedure. All non-viable and fibrotic tissue was excised utilizing a combination of #15 blade and curettage. There was adequate bleeding noted to the wound bed. The surgical site was then irrigated with copious amounts of sterile salines via bulb syringe. The remaining surgical site appears healthy and viable. Post-debridement measurements of the two wounds were 6.0 x 2.0 x 0.3 cm and 2.5 x 2.0 x 0.4 cm. Next, An Integra bilayer graft was applied over the 2 ulcerations and fixated into place with staples, Adaptic was applied over the grafts and fixated into place with staples    Finally, negative pressure wound therapy vac applied overlying the surgical sites . Wound VAC/ Negative pressure wound therapy applied to ulceration right leg    Adaptic and Black granulofoam applied to base of wound and sealed with KCI tape. The old canister was removed and a new one placed in the [facility] machine.  The VAC tubing

## 2023-12-11 NOTE — PROGRESS NOTES
Occupational [address]  Occupational Therapy Not Seen Note    DATE: 2023    NAME: Hany Jewell  MRN: [id number]   : 1985      Patient not seen this date for Occupational Therapy due to:    Patient independent with ADLs and functional mobility with no acute OT needs. Will defer OT evaluation at this time. Please reorder OT if future needs arise. RN in agreement.     Electronically signed by CADEN Montano/L on 2023 at 11:24 AM

## 2023-12-11 NOTE — PLAN OF CARE
Problem: Discharge Planning  Goal: Discharge to home or other facility with appropriate resources  12/11/2023 1605 by Lindsey Cooney RN  Outcome: Progressing  12/11/2023 0218 by Alecia Dlil RN  Outcome: Progressing  12/11/2023 0218 by Alecia Dill RN  Outcome: Progressing     Problem: Pain  Goal: Verbalizes/displays adequate comfort level or baseline comfort level  12/11/2023 1605 by Lindsey Cooney RN  Outcome: Progressing  12/11/2023 0218 by Alecia Dill RN  Outcome: Progressing  12/11/2023 0218 by Alecia Dill RN  Outcome: Progressing     Problem: Skin/Tissue Integrity  Goal: Absence of new skin breakdown  Description: 1. Monitor for areas of redness and/or skin breakdown  2. Assess vascular access sites hourly  3. Every 4-6 hours minimum:  Change oxygen saturation probe site  4. Every 4-6 hours:  If on nasal continuous positive airway pressure, respiratory therapy assess nares and determine need for appliance change or resting period.   12/11/2023 1605 by Lindsey Cooney RN  Outcome: Progressing  12/11/2023 0218 by Alecia Dill RN  Outcome: Progressing  12/11/2023 0218 by Alecia Dill RN  Outcome: Progressing     Problem: Safety - Adult  Goal: Free from fall injury  12/11/2023 1605 by Lindsey Cooney RN  Outcome: Progressing  12/11/2023 0218 by Alecia Dill RN  Outcome: Progressing  12/11/2023 0218 by Alecia Dill RN  Outcome: Progressing     Problem: ABCDS Injury Assessment  Goal: Absence of physical injury  12/11/2023 1605 by Lindsey Cooney RN  Outcome: Progressing  12/11/2023 0218 by Alecia Dill RN  Outcome: Progressing  12/11/2023 0218 by Alecia Dill RN  Outcome: Progressing

## 2023-12-11 NOTE — CARE COORDINATION
.Case Management Assessment  2Initial Evaluation    Date/Time of Evaluation: 12/11/2023 3:31 PM  Assessment Completed by: Cierra Tobin RN    If patient is discharged prior to next notation, then this note serves as note for discharge by case management. Patient Name: Hany Jewell                   YOB: 1985  Diagnosis: Cellulitis [L03.90]  Soft tissue infection [L08.9]  Sepsis without acute organ dysfunction, due to unspecified organism [facility]) [A41.9]                   Date / Time: 12/9/2023  9:54 AM    Patient Admission Status: Inpatient   Readmission Risk (Low < 19, Mod (19-27), High > 27): Readmission Risk Score: 3.7    Current PCP: File, Not On, FNP  PCP verified by CM? (P) No (has no PCP)    Chart Reviewed: Yes      History Provided by: Patient  Patient Orientation: Alert and Oriented    Patient Cognition: Alert    Hospitalization in the last 30 days (Readmission):  No    If yes, Readmission Assessment in CM Navigator will be completed.     Advance Directives:      Code Status: Full Code   Patient's Primary Decision Maker is: (P) Named in Scanned ACP Document    Primary Decision Maker: Nessa Case - Tanvi - [phone]    Discharge Planning:    Patient lives with: (P) Spouse/Significant Other Type of Home: (P) Apartment  Primary Care Giver: Self  Patient Support Systems include: (P) Friends/Neighbors   Current Financial resources: (P) Medicaid  Current community resources: (P) None  Current services prior to admission: (P) None            Current DME:              Type of Home Care services:  (P) None    ADLS  Prior functional level: (P) Independent in ADLs/IADLs  Current functional level: (P) Independent in ADLs/IADLs    PT AM-PAC:   /24  OT AM-PAC:   /24    Family can provide assistance at DC: (P) Yes  Would you like Case Management to discuss the discharge plan with any other family members/significant others, and if so, who? (P) No  Plans to Return to Present Housing: (P)

## 2023-12-11 NOTE — PLAN OF CARE
Problem: Discharge Planning  Goal: Discharge to home or other facility with appropriate resources  12/11/2023 0218 by Sima Zhong RN  Outcome: Progressing  12/11/2023 0218 by Sima Zhong RN  Outcome: Progressing  12/10/2023 1619 by Jose Ferro RN  Outcome: Progressing     Problem: Pain  Goal: Verbalizes/displays adequate comfort level or baseline comfort level  12/11/2023 0218 by Sima Zhong RN  Outcome: Progressing  12/11/2023 0218 by Sima Zhong RN  Outcome: Progressing  12/10/2023 1619 by Jose Ferro RN  Outcome: Progressing     Problem: Skin/Tissue Integrity  Goal: Absence of new skin breakdown  Description: 1. Monitor for areas of redness and/or skin breakdown  2. Assess vascular access sites hourly  3. Every 4-6 hours minimum:  Change oxygen saturation probe site  4. Every 4-6 hours:  If on nasal continuous positive airway pressure, respiratory therapy assess nares and determine need for appliance change or resting period.   12/11/2023 0218 by Sima Zhong RN  Outcome: Progressing  12/11/2023 0218 by Sima Zhong RN  Outcome: Progressing  12/10/2023 1619 by Jose Ferro RN  Outcome: Progressing     Problem: Safety - Adult  Goal: Free from fall injury  12/11/2023 0218 by Sima Zhong RN  Outcome: Progressing  12/11/2023 0218 by Sima Zhong RN  Outcome: Progressing  12/10/2023 1619 by Jose Ferro RN  Outcome: Progressing     Problem: ABCDS Injury Assessment  Goal: Absence of physical injury  12/11/2023 0218 by Sima Zhong RN  Outcome: Progressing  12/11/2023 0218 by Sima Zhong RN  Outcome: Progressing  12/10/2023 1619 by Jose Ferro RN  Outcome: Progressing

## 2023-12-11 NOTE — PROGRESS NOTES
[facility]  Internal Medicine Teaching Residency Program  Inpatient Daily Progress Note  ______________________________________________________________________________    Patient: Osvaldo Minor  YOB: 1985   PIF:[id number]    Acct: [de-identified]     Room: [address] [place] [address]  Admit date: 12/9/2023  Today's date: 12/11/23  Number of days in the hospital: 2    SUBJECTIVE   Admitting Diagnosis: Cellulitis  CC: Right lower extremity wounds    Pt seen and examined. No acute events overnight. Labs and charts reviewed. Afebrile, hemodynamically stable, saturating well on room air. BP slightly on the softer side, on clonidine and amlodipine. will hold amlodipine  Wound cultures growing Staph aureus, patient is MRSA carrier  ID on board, continue vancomycin  Podiatry on board, surgery today at 12 for I&D with wound VAC and graft application. Review of Systems  Review of Systems   Constitutional:  Positive for appetite change. Negative for activity change, chills and fever. Respiratory:  Negative for chest tightness, shortness of breath and wheezing. Cardiovascular:  Positive for leg swelling. Negative for chest pain and palpitations. Gastrointestinal:  Negative for abdominal distention, abdominal pain, constipation, diarrhea and nausea. Genitourinary:  Negative for difficulty urinating, dysuria and hematuria. Skin:  Positive for color change and wound (2 wound to RLE, lower leg higher ankle). Neurological:  Negative for dizziness and headaches. Psychiatric/Behavioral:  The patient is nervous/anxious.       BRIEF HISTORY     The patient is a pleasant 41 y.o. male with PMHx significant for IV drug use/fentanyl (last used 2 weeks ago), going to rehab (2 days), currently on methadone/Suboxone, Hepatitis C, MRSA positive, Hx of Benzos withdrawal Seizure in 2021, WALT, MDD presents with a chief complaint of right lower extremity wounds x 2 distal leg,

## 2023-12-11 NOTE — PROGRESS NOTES
[address]    Admission Medication Reconciliation       The patient's list of current home medications has been reviewed. Source(s) of information: Dispense Report    Based on information provided by the above source(s), I have updated the patient's home med list as described below. Please review the ACTION REQUESTED section of this note below for any discrepancies on current hospital orders. I changed or updated the following medications on the patient's home medication list:  Removed Clonazepam 1mg --> patient takes 0.5mg tablets      Added Clonazepam 0.5mg  Clonidine 0.2mg   Gabapentin 300mg  Trazodone 50mg      Adjusted   None   Other Notes Flagged for Provider Review:  Diphenhydramine 25mg --> no fill history on dispense report   Methadone 10mg/5mL --> no fill history on dispense report          PROVIDER ACTION REQUESTED  Medications that need to be addressed by a physician/nurse practitioner:    Medication Action Requested   Mirtazapine           Sertraline      There are recent fills for both the 30mg and 15mg tablets on the dispense report; both prescriptions say to take one tablet once daily. Please confirm which dose the patient is taking, or if they are on both strengths. There are recent fills for both the 100mg and 50mg tablets on the dispense report; both prescriptions say to take one tablet once daily. Please confirm which dose the patient is taking, or if they are on both strengths. Please feel free to call me with any questions about this encounter. Thank you.     Thank you  Lizy Giron, PharmD, [facility]  Inpatient Clinical Pharmacist  [phone]      Electronically signed by Juliet Melo on 12/11/2023 at 2:54 PM

## 2023-12-12 PROBLEM — A41.9 SEPSIS WITHOUT ACUTE ORGAN DYSFUNCTION (HCC): Status: RESOLVED | Noted: 2023-12-09 | Resolved: 2023-12-12

## 2023-12-12 LAB
ANION GAP SERPL CALCULATED.3IONS-SCNC: 13 MMOL/L (ref 9–17)
BASOPHILS # BLD: <0.03 K/UL (ref 0–0.2)
BASOPHILS NFR BLD: 0 % (ref 0–2)
BUN SERPL-MCNC: 12 MG/DL (ref 6–20)
CALCIUM SERPL-MCNC: 9.4 MG/DL (ref 8.6–10.4)
CHLORIDE SERPL-SCNC: 104 MMOL/L (ref 98–107)
CO2 SERPL-SCNC: 20 MMOL/L (ref 20–31)
CREAT SERPL-MCNC: 0.8 MG/DL (ref 0.7–1.2)
EOSINOPHIL # BLD: 0.07 K/UL (ref 0–0.44)
EOSINOPHILS RELATIVE PERCENT: 1 % (ref 1–4)
ERYTHROCYTE [DISTWIDTH] IN BLOOD BY AUTOMATED COUNT: 12.8 % (ref 11.8–14.4)
GFR SERPL CREATININE-BSD FRML MDRD: >60 ML/MIN/1.73M2
GLUCOSE SERPL-MCNC: 87 MG/DL (ref 70–99)
HCT VFR BLD AUTO: 34.9 % (ref 40.7–50.3)
HGB BLD-MCNC: 11.3 G/DL (ref 13–17)
IMM GRANULOCYTES # BLD AUTO: 0.04 K/UL (ref 0–0.3)
IMM GRANULOCYTES NFR BLD: 0 %
LYMPHOCYTES NFR BLD: 0.72 K/UL (ref 1.1–3.7)
LYMPHOCYTES RELATIVE PERCENT: 8 % (ref 24–43)
MCH RBC QN AUTO: 29.4 PG (ref 25.2–33.5)
MCHC RBC AUTO-ENTMCNC: 32.4 G/DL (ref 28.4–34.8)
MCV RBC AUTO: 90.6 FL (ref 82.6–102.9)
MICROORGANISM SPEC CULT: ABNORMAL
MICROORGANISM SPEC CULT: ABNORMAL
MICROORGANISM/AGENT SPEC: ABNORMAL
MICROORGANISM/AGENT SPEC: ABNORMAL
MONOCYTES NFR BLD: 0.57 K/UL (ref 0.1–1.2)
MONOCYTES NFR BLD: 6 % (ref 3–12)
NEUTROPHILS NFR BLD: 85 % (ref 36–65)
NEUTS SEG NFR BLD: 7.88 K/UL (ref 1.5–8.1)
NRBC BLD-RTO: 0 PER 100 WBC
PLATELET # BLD AUTO: 337 K/UL (ref 138–453)
PMV BLD AUTO: 9.4 FL (ref 8.1–13.5)
POTASSIUM SERPL-SCNC: 4 MMOL/L (ref 3.7–5.3)
RBC # BLD AUTO: 3.85 M/UL (ref 4.21–5.77)
SODIUM SERPL-SCNC: 137 MMOL/L (ref 135–144)
SPECIMEN DESCRIPTION: ABNORMAL
WBC OTHER # BLD: 9.3 K/UL (ref 3.5–11.3)

## 2023-12-12 PROCEDURE — 2580000003 HC RX 258

## 2023-12-12 PROCEDURE — 6370000000 HC RX 637 (ALT 250 FOR IP): Performed by: INTERNAL MEDICINE

## 2023-12-12 PROCEDURE — 99232 SBSQ HOSP IP/OBS MODERATE 35: CPT | Performed by: INTERNAL MEDICINE

## 2023-12-12 PROCEDURE — 36415 COLL VENOUS BLD VENIPUNCTURE: CPT

## 2023-12-12 PROCEDURE — 6360000002 HC RX W HCPCS

## 2023-12-12 PROCEDURE — 6370000000 HC RX 637 (ALT 250 FOR IP)

## 2023-12-12 PROCEDURE — 1200000000 HC SEMI PRIVATE

## 2023-12-12 PROCEDURE — 85025 COMPLETE CBC W/AUTO DIFF WBC: CPT

## 2023-12-12 PROCEDURE — 80048 BASIC METABOLIC PNL TOTAL CA: CPT

## 2023-12-12 RX ORDER — CEPHALEXIN 500 MG/1
500 CAPSULE ORAL 4 TIMES DAILY
Qty: 56 CAPSULE | Refills: 0 | Status: SHIPPED | OUTPATIENT
Start: 2023-12-12 | End: 2023-12-26

## 2023-12-12 RX ORDER — CEPHALEXIN 500 MG/1
500 CAPSULE ORAL EVERY 6 HOURS SCHEDULED
Status: DISCONTINUED | OUTPATIENT
Start: 2023-12-12 | End: 2023-12-13 | Stop reason: HOSPADM

## 2023-12-12 RX ADMIN — CEPHALEXIN 500 MG: 500 CAPSULE ORAL at 17:34

## 2023-12-12 RX ADMIN — CLONAZEPAM 0.5 MG: 0.5 TABLET ORAL at 18:07

## 2023-12-12 RX ADMIN — CLONIDINE HYDROCHLORIDE 0.2 MG: 0.2 TABLET ORAL at 21:33

## 2023-12-12 RX ADMIN — CEPHALEXIN 500 MG: 500 CAPSULE ORAL at 13:57

## 2023-12-12 RX ADMIN — CLONIDINE HYDROCHLORIDE 0.1 MG: 0.1 TABLET ORAL at 09:54

## 2023-12-12 RX ADMIN — ENOXAPARIN SODIUM 40 MG: 100 INJECTION SUBCUTANEOUS at 09:55

## 2023-12-12 RX ADMIN — VANCOMYCIN HYDROCHLORIDE 1250 MG: 1.25 INJECTION, POWDER, LYOPHILIZED, FOR SOLUTION INTRAVENOUS at 02:50

## 2023-12-12 RX ADMIN — CLONAZEPAM 0.5 MG: 0.5 TABLET ORAL at 10:01

## 2023-12-12 RX ADMIN — METHADONE HYDROCHLORIDE 225 MG: 5 SOLUTION ORAL at 06:42

## 2023-12-12 RX ADMIN — AMLODIPINE BESYLATE 5 MG: 5 TABLET ORAL at 09:54

## 2023-12-12 RX ADMIN — TRAZODONE HYDROCHLORIDE 50 MG: 50 TABLET ORAL at 21:33

## 2023-12-12 RX ADMIN — MIRTAZAPINE 45 MG: 30 TABLET, ORALLY DISINTEGRATING ORAL at 21:33

## 2023-12-12 RX ADMIN — SODIUM CHLORIDE, PRESERVATIVE FREE 10 ML: 5 INJECTION INTRAVENOUS at 21:34

## 2023-12-12 RX ADMIN — SODIUM CHLORIDE, PRESERVATIVE FREE 10 ML: 5 INJECTION INTRAVENOUS at 09:55

## 2023-12-12 NOTE — PROGRESS NOTES
CLINICAL PHARMACY NOTE: MEDS TO BEDS    Total # of Prescriptions Filled: 1   The following medications were delivered to the patient:  Cephalexin     Additional Documentation:   No copay

## 2023-12-12 NOTE — PROGRESS NOTES
[facility] - Progress Note    Today's Date and Time: 12/12/2023, 10:41 AM    Impression :   Infected foot and leg wounds x 2 secondary to IV drug use. Infection started about 2 weeks ago  Cellulitis  Multiple prior injection sites  MRSA carrier  Hepatitis C    Recommendations:   D/C Vancomycin  Keflex 500 mg po qid x 14 days start at discharge  Wound care as per Podiatry  Follow up with Podiatry  S/P Debridement and skin graft 12-11-23    Medical Decision Making/Summary/Discussion:12/12/2023     Daily Medical Decision Making Provided by the Attending Physician:    Current Problems:  Infected foot and leg wounds x 2 secondary to IV drug use. Infection started about 2 weeks ago  Cellulitis  Multiple prior injection sites  MRSA carrier  Hepatitis C    Elements of Medical Decision Making:  Note: I have independently performed the steps listed below as part of the medical decision making and evaluation. Examined and discussed with patient. Patient to undergo debridement and graft  Patient believes he injected Fentanyl with xylazine which would account for necrosis  Changes in Physical exam  Patient with open wounds  Changes in ROS  Discussed with referring Physician or service  Podiatry  Dr Shonna Nassar, medications, radiologic studies were reviewed with personal review of films  Radiologic studies  Lab work  Cultures S aureus  Large amounts of data were reviewed  Discussed with nursing Staff, Discharge planner  Infection Control and Prevention measures reviewed  Universal precautions   Contact Isolation  All prior entries were reviewed  Administer medications as ordered  Prognosis:   Guarded  Discharge planning reviewed  Follow up as outpatient.     Chata Arnett MD.    Infection Control Recommendations   [place] Precautions  Contact precautions for MRSA    Antimicrobial Stewardship Recommendations     Simplification of therapy  Targeted therapy  PK dosing    Coordination of patient, RN, Podiatry. I have personally reviewed the past medical history, past surgical history, medications, social history, and family history, and I have updated the database accordingly.   Past Medical History:     Past Medical History:   Diagnosis Date    Asthma     MRSA (methicillin resistant staph aureus) culture positive 9/1/2014    leg    Seizures (HCC)     Viral hepatitis C        Past Surgical  History:     Past Surgical History:   Procedure Laterality Date    INCISION AND DRAINAGE FOOT Right 12/11/2023    LOWER EXTREMITY I&D AND APPLICATION OF GRAFT - Right       Medications:      vancomycin  1,250 mg IntraVENous Q12H    amLODIPine  5 mg Oral Daily    sodium chloride flush  5-40 mL IntraVENous 2 times per day    enoxaparin  40 mg SubCUTAneous Daily    vancomycin (VANCOCIN) intermittent dosing (placeholder)   Other RX Placeholder    mirtazapine  45 mg Oral Nightly    traZODone  50 mg Oral Nightly    cloNIDine  0.2 mg Oral Nightly    cloNIDine  0.1 mg Oral Daily    methadone  225 mg Oral QAM       Social History:     Social History     Socioeconomic History    Marital status: Single     Spouse name: Not on file    Number of children: Not on file    Years of education: Not on file    Highest education level: Not on file   Occupational History    Not on file   Tobacco Use    Smoking status: Every Day    Smokeless tobacco: Never    Tobacco comments:     5-6 cigarettes per day   Vaping Use    Vaping Use: Every day   Substance and Sexual Activity    Alcohol use: No    Drug use: No     Comment: clean for 5 years    Sexual activity: Not on file   Other Topics Concern    Not on file   Social History Narrative    Not on file     Social Determinants of Health     Financial Resource Strain: Not on file   Food Insecurity: Food Insecurity Present (12/9/2023)    Hunger Vital Sign     Worried About Running Out of Food in the Last Year: Sometimes true     Ran Out of Food in the Last Year: Often true   Transportation Description . BLOOD     Special Requests R WRIST 6ML     Culture NO GROWTH 2 DAYS              Medical Decision Making-Other:     Note:    Thank you for allowing us to participate in the care of this patient. Please call with questions. MD Angelique Lobo PA Student participated in the evaluation of the patient, under observation. ATTESTATION:     I have discussed the case, including pertinent history and exam findings with the medical resident/student. I have evaluated the  History, physical findings and pictures of the patient and the key elements of the encounter have been performed by me. I have reviewed the laboratory data, other diagnostic studies and discussed them with the medical resident/student. I have updated the medical record where necessary. I agree with the assessment, plan and orders as documented by the medical resident/student  and I have modified them as necessary. In addition diagnostic and decision making elements, performed by the Attending Physician, are included in the Diagnostic and Decision Making Section of the text.      Darrius Culp MD      Pager: [phone] - Office: [phone]

## 2023-12-12 NOTE — PLAN OF CARE
Problem: Discharge Planning  Goal: Discharge to home or other facility with appropriate resources  12/12/2023 0211 by Jayshree Mary RN  Outcome: Progressing  Flowsheets (Taken 12/11/2023 2000)  Discharge to home or other facility with appropriate resources:   Identify barriers to discharge with patient and caregiver   Arrange for needed discharge resources and transportation as appropriate   Identify discharge learning needs (meds, wound care, etc)   Refer to discharge planning if patient needs post-hospital services based on physician order or complex needs related to functional status, cognitive ability or social support system   Arrange for interpreters to assist at discharge as needed  12/11/2023 1605 by Amelia Juárez RN  Outcome: Progressing     Problem: Pain  Goal: Verbalizes/displays adequate comfort level or baseline comfort level  12/12/2023 0211 by Jayshree Mary RN  Outcome: Progressing  Flowsheets (Taken 12/11/2023 2223)  Verbalizes/displays adequate comfort level or baseline comfort level:   Encourage patient to monitor pain and request assistance   Assess pain using appropriate pain scale   Administer analgesics based on type and severity of pain and evaluate response   Implement non-pharmacological measures as appropriate and evaluate response   Notify Licensed Independent Practitioner if interventions unsuccessful or patient reports new pain   Consider cultural and social influences on pain and pain management  12/11/2023 1605 by Amelia Juárez RN  Outcome: Progressing     Problem: Skin/Tissue Integrity  Goal: Absence of new skin breakdown  Description: 1. Monitor for areas of redness and/or skin breakdown  2. Assess vascular access sites hourly  3. Every 4-6 hours minimum:  Change oxygen saturation probe site  4. Every 4-6 hours:  If on nasal continuous positive airway pressure, respiratory therapy assess nares and determine need for appliance change or resting period.   12/12/2023 0211 by Elyse Lynne RN  Outcome: Progressing  12/11/2023 1605 by Murray Mesa RN  Outcome: Progressing     Problem: Safety - Adult  Goal: Free from fall injury  12/12/2023 0211 by Elyse Lynne RN  Outcome: Denys Zamora (Taken 12/11/2023 2000)  Free From Fall Injury: Instruct family/caregiver on patient safety  12/11/2023 1605 by Murray Mesa RN  Outcome: Progressing     Problem: ABCDS Injury Assessment  Goal: Absence of physical injury  12/12/2023 0211 by Elyse Lynne RN  Outcome: Progressing  Flowsheets (Taken 12/11/2023 2000)  Absence of Physical Injury: Implement safety measures based on patient assessment  12/11/2023 1605 by Murray Mesa RN  Outcome: Progressing

## 2023-12-12 NOTE — CARE COORDINATION
Transitional planning    Writer to room to discuss wound vac, and need for 3 x weekly changes, either SNF placement , OP wound clinic, plan or home with [facility] , need following MD, orders and home care, paperwork in chart needs completed, message sent to MD .      1030 Dr Robson Pollock will follow for home care, referrals sent to [facility], [facility] and [facility].    1100 calls to [facility] and [facility], hand faxed to [facility], spoke with Arjun Sanders. Vac rep for [facility] Shanks at bedside. 1200 vac paperwork and clinicals faxed to [facility]. 1430 call to [facility] , spoke with Rodrick Frey , she will review. Call to [facility], they will review.     1500 call back to [facility], no staffing, call to [facility] [facility], spoke with Alexus Joe given suggestions for [facility], and [facility], call to [facility], no longer taking patients, call to [facility], out of service area

## 2023-12-12 NOTE — PROGRESS NOTES
[facility]  Internal Medicine Teaching Residency Program  Inpatient Daily Progress Note  ______________________________________________________________________________    Patient: Maryuri Moralez  YOB: 1985   HAT:[id number]    Acct: [de-identified]     Room: [address] [place] [address]  Admit date: 12/9/2023  Today's date: 12/12/23  Number of days in the hospital: 3    SUBJECTIVE   Admitting Diagnosis: Cellulitis  CC: Right lower extremity wounds    - No acute overnight events. - Patient is afebrile, hemodynamically stable and saturating well on room air. Patient denied fever,chills,N/V,abdominal pain,diarrhea and constipation,chest pain and SOB. - Patient underwent wound debridement and application of skin graft substitute and wound vac. Patient is on Vancomycin as per ID. (Wound culture - S.aureus + and MRSA DNA nasal probe +)    - Labs wnl    Plan for D/C after Podiatry clearance. BRIEF HISTORY     The patient is a pleasant 41 y.o. male with PMHx significant for IV drug use/fentanyl (last used 2 weeks ago), going to rehab (2 days), currently on methadone/Suboxone, Hepatitis C, MRSA positive, Hx of Benzos withdrawal Seizure in 2021, WALT, MDD presents with a chief complaint of right lower extremity wounds x 2 distal leg, proximal foot, started to develop after patient injected IV fentanyl to the area 2 weeks ago which according to patient might be contaminated with some kind of poison, erythema was noted initially and then wound started to get worse, more painful, dark/black eschar was noted followed by blistering which popped and uncovered an open and split skin wound, purulent discharge noted, erythema surrounding wounds with numbness to the skin between both wounds. According to patient, he had history of IV site infections before we usually resolve on itself.     In ED, patient was found to be slightly hypertensive 150/90s, AOx4, saturating well on room CULTURE No anaerobic organisms isolated at 3 days. 12/09/2023 07:44 PM       Imaging:    XR CHEST PORTABLE    Result Date: 12/9/2023  No radiographic evidence of acute cardiopulmonary pathology. XR FOOT RIGHT (MIN 3 VIEWS)    Result Date: 12/9/2023  No acute osseous abnormality. XR TIBIA FIBULA RIGHT (2 VIEWS)    Result Date: 12/9/2023  Soft tissue swelling without osseous abnormality       ASSESSMENT & PLAN     ASSESSMENT / PLAN:   IMPRESSION  This is a 41 y.o. male with PMHx significant for IV drug use, chronic to rehab, following methadone clinic, hepatitis C, MRSA carrier, Hx of benzo withdrawal seizure in 2021, WALT, MDD, HTN who presented with chief complaint of worsening right lower extremity wounds x 2, and found to have cellulitis. ID and podiatry on board     Principal Problem:    Cellulitis  Active Problems:    Soft tissue infection    Sepsis without acute organ dysfunction (HCC)    MRSA infection    Cellulitis of right leg    IV drug abuse ([address])    Multiple open wounds of right lower extremity    Ulcers of both lower extremities with fat layer exposed ([address])    Cellulitis of leg, left    Cellulitis of leg, right    Non-healing ulcer of lower leg, right, with necrosis of muscle ([address])  Resolved Problems:    * No resolved hospital problems. *        Principal Problem:  Right lower extremity cellulitis/ wounds x2 leg and foot  MRSA carrier  - secondary to IV drug use, 2 weeks ago. - pulse appreciated bilaterally. -X-ray/tibia-fib -negative for soft tissue of ischemia, acute fracture/dislocation, foreign body or osteomyelitis, only soft tissue swelling was noted.   - started on Vanco and Zosyn switched to only Vancomycin by ID.  - ID consulted, appreciate recommendations, continue with vancomycin, wet-to-dry dressings with each shift.  -Wound culture - MSSA  - 12/12 - Underwent excisional debridement and application of skin graft substitute and wound vac       Hypertension - on clonidine 0.1 in a.m., 0.2 in p.m., resumed, amlodipine 5 added     History of IV drug use/IV fentanyl, benzos  - Last used 2 weeks ago  - Per patient he quit, going to rehab for last 2 days  - Following with methadone clinic.  - Methadone dose confirmed 225mg and resumed  - Encouraged to continue cessation of drug use. Hx of major depressive disorder, anxiety  - per Dispense report, On clonazepam 0.5 3 times daily as needed, mirtazapine 45, trazodone 50   - Trazodone and Mirtazapine resumed        DVT ppx: Lovenox     PT/OT/SW: On board  Discharge Planning: Plan for D/C after Podiatry clearance.  on board. Deloris Velasco MD  Internal Medicine Resident PGY-1  [facility],  [address]    10:20 AM 12/12/2023     Please note that part of this chart was generated using voice recognition dictation software. Although every effort was made to ensure the accuracy of this automated transcription, some errors in transcription may have occurred.

## 2023-12-12 NOTE — DISCHARGE INSTRUCTIONS
Podiatric Discharge Instructions  You are being discharged to home after debridement on your right leg ulcers. Fluids and Diet:  Drink water regularly and avoid fluids with added sugar  If not nauseated then resume your regular pre-operative diet when you are ready    Medications: Take your prescriptions as directed  You are receiving new prescriptions for antibiotics, infectious disease recommended Keflex 500 4 times daily for 14 days. If your pain is not severe then you may take the non-prescription medication that you normally take for aches and pains  You may resume your regularly scheduled medications (unless otherwise directed)  If any side effects or adverse reactions occur, discontinue the medication and contact your doctor. Review the patient drug information that is provided before you take any medication    Ambulation and Activity:  You are advised to go directly home from the hospital  Use walker as needed for mobility  You may put partial weight on the operated foot. You should wear surgical shoe at all times when awake. Avoid stairs if possible  Keep heels elevated off bed with pillows to avoid pressure ulcerations   Do not lift or move heavy objects  Do not drive until cleared by your physician    Bandage and Wound Care Instructions:  Patient has Wound VAC in place to right leg, this will stay on and go home with patient. Wound VAC dressing will require changes every Monday, Wednesday, and Friday by home health care. Wound VAC is set to 125mmHg continuous flow. It is rechargable and holds charge for roughly 10 hours when unplugged. Please have nurses make sure that it is functioning properly until discharge.    VAC dressing: Wound VAC tape over the ulcers, cut out a window overlying the 2 ulcerations, black foam to ulcer bases, VAC tape over top of the foam, foam bridge between the 2 ulcerations, wound VAC tape over top of foam bridge, suction pad attached to the central aspect of the

## 2023-12-12 NOTE — PLAN OF CARE
Problem: Discharge Planning  Goal: Discharge to home or other facility with appropriate resources  12/12/2023 1150 by Easton Vuong RN  Outcome: Progressing  12/12/2023 0211 by Marissa Landa RN  Outcome: Alejandrina Dec (Taken 12/11/2023 2000)  Discharge to home or other facility with appropriate resources:   Identify barriers to discharge with patient and caregiver   Arrange for needed discharge resources and transportation as appropriate   Identify discharge learning needs (meds, wound care, etc)   Refer to discharge planning if patient needs post-hospital services based on physician order or complex needs related to functional status, cognitive ability or social support system   Arrange for interpreters to assist at discharge as needed     Problem: Pain  Goal: Verbalizes/displays adequate comfort level or baseline comfort level  12/12/2023 1150 by Easton Vuong RN  Outcome: Progressing  12/12/2023 0211 by Marissa Landa RN  Outcome: Progressing  Flowsheets (Taken 12/11/2023 2223)  Verbalizes/displays adequate comfort level or baseline comfort level:   Encourage patient to monitor pain and request assistance   Assess pain using appropriate pain scale   Administer analgesics based on type and severity of pain and evaluate response   Implement non-pharmacological measures as appropriate and evaluate response   Notify Licensed Independent Practitioner if interventions unsuccessful or patient reports new pain   Consider cultural and social influences on pain and pain management     Problem: Skin/Tissue Integrity  Goal: Absence of new skin breakdown  Description: 1. Monitor for areas of redness and/or skin breakdown  2. Assess vascular access sites hourly  3. Every 4-6 hours minimum:  Change oxygen saturation probe site  4.   Every 4-6 hours:  If on nasal continuous positive airway pressure, respiratory therapy assess nares and determine need for appliance change or resting

## 2023-12-13 VITALS
DIASTOLIC BLOOD PRESSURE: 82 MMHG | TEMPERATURE: 97.6 F | BODY MASS INDEX: 23.86 KG/M2 | SYSTOLIC BLOOD PRESSURE: 125 MMHG | HEIGHT: 73 IN | WEIGHT: 180 LBS | OXYGEN SATURATION: 100 % | HEART RATE: 89 BPM | RESPIRATION RATE: 16 BRPM

## 2023-12-13 LAB
ANION GAP SERPL CALCULATED.3IONS-SCNC: 13 MMOL/L (ref 9–17)
BASOPHILS # BLD: 0.07 K/UL (ref 0–0.2)
BASOPHILS NFR BLD: 1 % (ref 0–2)
BUN SERPL-MCNC: 14 MG/DL (ref 6–20)
CALCIUM SERPL-MCNC: 9.8 MG/DL (ref 8.6–10.4)
CHLORIDE SERPL-SCNC: 101 MMOL/L (ref 98–107)
CO2 SERPL-SCNC: 24 MMOL/L (ref 20–31)
CREAT SERPL-MCNC: 0.8 MG/DL (ref 0.7–1.2)
EOSINOPHIL # BLD: 0.24 K/UL (ref 0–0.44)
EOSINOPHILS RELATIVE PERCENT: 4 % (ref 1–4)
ERYTHROCYTE [DISTWIDTH] IN BLOOD BY AUTOMATED COUNT: 13 % (ref 11.8–14.4)
GFR SERPL CREATININE-BSD FRML MDRD: >60 ML/MIN/1.73M2
GLUCOSE SERPL-MCNC: 89 MG/DL (ref 70–99)
HCT VFR BLD AUTO: 40.2 % (ref 40.7–50.3)
HGB BLD-MCNC: 13.1 G/DL (ref 13–17)
IMM GRANULOCYTES # BLD AUTO: 0.05 K/UL (ref 0–0.3)
IMM GRANULOCYTES NFR BLD: 1 %
LYMPHOCYTES NFR BLD: 1.99 K/UL (ref 1.1–3.7)
LYMPHOCYTES RELATIVE PERCENT: 35 % (ref 24–43)
MCH RBC QN AUTO: 29.3 PG (ref 25.2–33.5)
MCHC RBC AUTO-ENTMCNC: 32.6 G/DL (ref 28.4–34.8)
MCV RBC AUTO: 89.9 FL (ref 82.6–102.9)
MONOCYTES NFR BLD: 0.5 K/UL (ref 0.1–1.2)
MONOCYTES NFR BLD: 9 % (ref 3–12)
NEUTROPHILS NFR BLD: 49 % (ref 36–65)
NEUTS SEG NFR BLD: 2.77 K/UL (ref 1.5–8.1)
NRBC BLD-RTO: 0 PER 100 WBC
PLATELET # BLD AUTO: 267 K/UL (ref 138–453)
PMV BLD AUTO: 10.4 FL (ref 8.1–13.5)
POTASSIUM SERPL-SCNC: 4 MMOL/L (ref 3.7–5.3)
RBC # BLD AUTO: 4.47 M/UL (ref 4.21–5.77)
SODIUM SERPL-SCNC: 138 MMOL/L (ref 135–144)
WBC OTHER # BLD: 5.6 K/UL (ref 3.5–11.3)

## 2023-12-13 PROCEDURE — 99232 SBSQ HOSP IP/OBS MODERATE 35: CPT | Performed by: INTERNAL MEDICINE

## 2023-12-13 PROCEDURE — 36415 COLL VENOUS BLD VENIPUNCTURE: CPT

## 2023-12-13 PROCEDURE — 6370000000 HC RX 637 (ALT 250 FOR IP)

## 2023-12-13 PROCEDURE — 94761 N-INVAS EAR/PLS OXIMETRY MLT: CPT

## 2023-12-13 PROCEDURE — 2580000003 HC RX 258

## 2023-12-13 PROCEDURE — 6370000000 HC RX 637 (ALT 250 FOR IP): Performed by: INTERNAL MEDICINE

## 2023-12-13 PROCEDURE — 85025 COMPLETE CBC W/AUTO DIFF WBC: CPT

## 2023-12-13 PROCEDURE — 6360000002 HC RX W HCPCS

## 2023-12-13 PROCEDURE — 80048 BASIC METABOLIC PNL TOTAL CA: CPT

## 2023-12-13 RX ORDER — MIRTAZAPINE 15 MG/1
15 TABLET, FILM COATED ORAL NIGHTLY
COMMUNITY

## 2023-12-13 RX ADMIN — ENOXAPARIN SODIUM 40 MG: 100 INJECTION SUBCUTANEOUS at 08:29

## 2023-12-13 RX ADMIN — METHADONE HYDROCHLORIDE 225 MG: 5 SOLUTION ORAL at 06:10

## 2023-12-13 RX ADMIN — CLONAZEPAM 0.5 MG: 0.5 TABLET ORAL at 08:29

## 2023-12-13 RX ADMIN — CEPHALEXIN 500 MG: 500 CAPSULE ORAL at 06:09

## 2023-12-13 RX ADMIN — SODIUM CHLORIDE, PRESERVATIVE FREE 10 ML: 5 INJECTION INTRAVENOUS at 08:53

## 2023-12-13 RX ADMIN — CLONAZEPAM 0.5 MG: 0.5 TABLET ORAL at 15:57

## 2023-12-13 RX ADMIN — CEPHALEXIN 500 MG: 500 CAPSULE ORAL at 01:04

## 2023-12-13 RX ADMIN — CEPHALEXIN 500 MG: 500 CAPSULE ORAL at 12:22

## 2023-12-13 RX ADMIN — CLONIDINE HYDROCHLORIDE 0.1 MG: 0.1 TABLET ORAL at 08:29

## 2023-12-13 ASSESSMENT — PAIN - FUNCTIONAL ASSESSMENT: PAIN_FUNCTIONAL_ASSESSMENT: ACTIVITIES ARE NOT PREVENTED

## 2023-12-13 ASSESSMENT — PAIN SCALES - GENERAL: PAINLEVEL_OUTOF10: 0

## 2023-12-13 NOTE — PROGRESS NOTES
[address]    Admission Medication Reconciliation       The patient's list of current home medications has been reviewed. Source(s) of information: Dispense Report, Patient    Based on information provided by the above source(s), I have updated the patient's home med list as described below. Please review the ACTION REQUESTED section of this note below for any discrepancies on current hospital orders. I changed or updated the following medications on the patient's home medication list:  Removed None     Added Sertraline 50 mg  Mirtazapine 15 mg     Adjusted   Sertraline 100 mg --> updated instructions  Mirtazapine 30 mg --> updated instructions and dosage form   Other Notes I verified the current doses of sertraline and mirtazapine with both the dispense record and patient. The patient stated he takes both a 50mg and a 100mg tablet of sertraline daily (150mg total). The patient stated he takes both a 15mg and a 30mg tablet of mirtazapine daily (45mg total). PROVIDER ACTION REQUESTED  Medications that need to be addressed by a physician/nurse practitioner:    Medication Action Requested   Sertraline   Mirtazapine     Please review the following medications on the patients home medication list and their adjusted dosages per the dispense report and the patient. Please feel free to call me with any questions about this encounter. Thank you.     Thank you  Sherron Mora, PharmD, [facility]  Inpatient Clinical Pharmacist  [phone]      Electronically signed by Luis Peres on 12/13/2023 at 2:03 PM

## 2023-12-13 NOTE — CARE COORDINATION
Transitional planning    Call to cynthia with [facility] to inquire on vac status, left vm. [facility], spoke with Jo Johnston he will have delivery person call with [facility]. VM from Stefan with [facility], will be here by 12. Referrals sent to [facility], called no staff, called [facility] Morin spoke with Jackeline Mendez, Call to [facility], not taking new referrals. Patient will need to go OP for vac changes and is aware, he will need to arrange transportation through his insurance.

## 2023-12-13 NOTE — PROGRESS NOTES
[facility]  Internal Medicine Teaching Residency Program  Inpatient Daily Progress Note  ______________________________________________________________________________    Patient: Lora Gallegos  YOB: 1985   [id number]    Acct: [de-identified]     Room: [address] [place] [address]  Admit date: 12/9/2023  Today's date: 12/13/23  Number of days in the hospital: 4    SUBJECTIVE   Admitting Diagnosis: Cellulitis  CC: Right lower extremity wounds    - No acute overnight events. - Patient is afebrile, hemodynamically stable and saturating well on room air. Patient denied fever,chills,N/V,abdominal pain,diarrhea and constipation,chest pain and SOB. - Patient underwent wound debridement and application of skin graft substitute and wound vac. ID changed vancomycin to Keflex. As per podiatry can be D/C. VAC will need to be changed every Monday, Wednesday, and Friday by home health care. - Labs wnl    Plan for D/C after home care        BRIEF HISTORY     The patient is a pleasant 41 y.o. male with PMHx significant for IV drug use/fentanyl (last used 2 weeks ago), going to rehab (2 days), currently on methadone/Suboxone, Hepatitis C, MRSA positive, Hx of Benzos withdrawal Seizure in 2021, WALT, MDD presents with a chief complaint of right lower extremity wounds x 2 distal leg, proximal foot, started to develop after patient injected IV fentanyl to the area 2 weeks ago which according to patient might be contaminated with some kind of poison, erythema was noted initially and then wound started to get worse, more painful, dark/black eschar was noted followed by blistering which popped and uncovered an open and split skin wound, purulent discharge noted, erythema surrounding wounds with numbness to the skin between both wounds. According to patient, he had history of IV site infections before we usually resolve on itself.     In ED, patient was found to be slightly 07:44 PM       Imaging:    XR CHEST PORTABLE    Result Date: 12/9/2023  No radiographic evidence of acute cardiopulmonary pathology. XR FOOT RIGHT (MIN 3 VIEWS)    Result Date: 12/9/2023  No acute osseous abnormality. XR TIBIA FIBULA RIGHT (2 VIEWS)    Result Date: 12/9/2023  Soft tissue swelling without osseous abnormality       ASSESSMENT & PLAN     ASSESSMENT / PLAN:   IMPRESSION  This is a 41 y.o. male with PMHx significant for IV drug use, chronic to rehab, following methadone clinic, hepatitis C, MRSA carrier, Hx of benzo withdrawal seizure in 2021, WALT, MDD, HTN who presented with chief complaint of worsening right lower extremity wounds x 2, and found to have cellulitis. ID and podiatry on board     Principal Problem:    Cellulitis  Active Problems:    Soft tissue infection    MRSA infection    Cellulitis of right leg    IV drug abuse ([address])    Multiple open wounds of right lower extremity    Ulcers of both lower extremities with fat layer exposed ([address])    Cellulitis of leg, left    Cellulitis of leg, right    Non-healing ulcer of lower leg, right, with necrosis of muscle ([address])  Resolved Problems:    Sepsis without acute organ dysfunction (HCC)        Principal Problem:  Right lower extremity cellulitis/ wounds x2 leg and foot  MRSA carrier  - secondary to IV drug use, 2 weeks ago. - pulse appreciated bilaterally. -X-ray/tibia-fib -negative for soft tissue of ischemia, acute fracture/dislocation, foreign body or osteomyelitis, only soft tissue swelling was noted. - started on Vanco and Zosyn switched to only Vancomycin by ID.  - ID consulted, appreciate recommendations, continue with vancomycin, wet-to-dry dressings with each shift.  -Wound culture - MSSA  - 12/12 - Underwent excisional debridement and application of skin graft substitute and wound vac  -Vanc D/C. On Keflex x 14 days.        Hypertension - on clonidine 0.1 in a.m., 0.2 in p.m., resumed, amlodipine 5 added     History of IV drug

## 2023-12-13 NOTE — PROGRESS NOTES
disagreement are noted on the chart. I was personally present for the key portions of any procedures. I have documented in the chart those procedures where I was not present during the key portions. I have reviewed the Podiatry Resident progress note. I agree with the chief complaint, past medical history, past surgical history, allergies, medications, social and family history as documented unless otherwise noted below. Documentation of the HPI, Physical Exam and Medical Decision Making performed by medical students or scribes is based on my personal performance of the HPI, PE and MDM. I have personally evaluated this patient and have completed at least one if not all key elements of the E/M (history, physical exam, and MDM). Additional findings are as noted.      Marlene Shah DPM on 12/13/2023 at 1:09 PM  Board Certified, American Board of Podiatric Surgery  Fellow, [employer] and [employer]

## 2023-12-14 LAB
MICROORGANISM SPEC CULT: NORMAL
SERVICE CMNT-IMP: NORMAL
SPECIMEN DESCRIPTION: NORMAL

## 2023-12-15 DIAGNOSIS — L97.913 NON-HEALING ULCER OF LOWER LEG, RIGHT, WITH NECROSIS OF MUSCLE (HCC): ICD-10-CM

## 2023-12-15 DIAGNOSIS — S81.801A MULTIPLE OPEN WOUNDS OF RIGHT LOWER EXTREMITY, INITIAL ENCOUNTER: ICD-10-CM

## 2023-12-15 DIAGNOSIS — L03.115 CELLULITIS OF RIGHT LEG: Primary | ICD-10-CM

## 2023-12-20 NOTE — PROGRESS NOTES
Patient instructed to remove shoes and socks and instructed to sit in exam chair.  Current PCP is File, Not On, FNP and date of last visit was unknown.   Do you have a follow up visit scheduled?  No  If yes, the date is

## 2023-12-26 NOTE — DISCHARGE INSTRUCTIONS
[address] -Phone: [phone] Fax: [phone]    Visit  Discharge Instructions / Physician Orders     DATE: 12/27/2023     Home Care:      SUPPLIES ORDERED THRU:      Wound Location: Right Ankle     Cleanse with: Keep Dry  Intact     Dressing Orders: Skin Prep and drape around wounds, Adaptic over grafts, Black foam, continous low intensity suction at 125 mmHg     Frequency: Keep Dry Intact     Additional Orders: Increase protein to diet (meat, cheese, eggs, fish, peanut butter, nuts and beans)  ELEVATE LEGS AS MUCH AS POSSIBLE  Vascular Studies have been ordered, Please Call [phone] to schedule an appointment at your earliest convenience. Your next appointment with Marlin Hunter is in 1 week at podiatry clinic     (Please note your next appointment above and if you are unable to keep, kindly give a 24 hour notice. Thank you.)  If more than 15 min late we cannot guarantee you will be seen due to clinician schedule  Per Policy, Excessive cancellation will call for dismissal from program.     If you experience any of the following, please call the [facility] during business hours:  [phone]  Your Phone call may be forwarded to Marquise Bell during business hours that [facility] is closed. * Increase in Pain  * Temperature over 101  * Increase in drainage from your wound  * Drainage with a foul odor  * Bleeding  * Increase in swelling  * Need for compression bandage changes due to slippage, breakthrough drainage. If you need medical attention outside of the business hours of the [facility] please contact your PCP or go to the nearest emergency room. The information contained in the After Visit Summary has been reviewed with me, the patient and/or responsible adult, by my health care provider(s). I had the opportunity to ask questions regarding this information.  I have elected to receive;      []After Visit Summary  [x]Comprehensive

## 2023-12-26 NOTE — DISCHARGE SUMMARY
[address]     Department of Internal Medicine - Staff Internal Medicine Teaching Service    INPATIENT DISCHARGE SUMMARY      Patient Identification:  Ronaldo Gardner is a 41 y.o. male. :  1985  MRN: [id number]     Acct: [de-identified]   PCP: File, Not On, FNP  Admit Date:  2023  Discharge date and time: 2023  6:13 PM   Attending Provider: No att. providers found                                     [address] [facility] Problem Lists:  Principal Problem:    Cellulitis  Active Problems:    Soft tissue infection    MRSA infection    Cellulitis of right leg    IV drug abuse ([address])    Multiple open wounds of right lower extremity    Ulcers of both lower extremities with fat layer exposed ([address])    Cellulitis of leg, left    Cellulitis of leg, right    Non-healing ulcer of lower leg, right, with necrosis of muscle ([address])  Resolved Problems:    Sepsis without acute organ dysfunction [facility])      HOSPITAL STAY     Brief Inpatient course:   Ronaldo Gardner is a 41 y.o. male who presented with right lower extremity wounds which started to develop after patient injected fentanyl to the area. Wound culture grew MSSA but MRSA DNA nasal probe was positive. Patient was given a course of vancomycin. Patient underwent wound debridement and application of skin graft substitute and wound vac.      On discharge,patient was asked to follow up with PCP and Podiatrist.      Consults:     Consults:     Final Specialist Recommendations/Findings:   IP CONSULT TO IV TEAM  IP CONSULT TO INTERNAL MEDICINE  IP CONSULT TO INFECTIOUS DISEASES  IP CONSULT TO PODIATRY  IP CONSULT TO CASE MANAGEMENT  PHARMACY TO DOSE VANCOMYCIN  PHARMACY TO DOSE VANCOMYCIN  IP CONSULT TO HOME CARE NEEDS      Any Hospital Acquired Infections: none    Discharge Functional Status:  stable    DISCHARGE PLAN     Disposition: home    Patient Instructions:   Discharge Medication List as of 2023  2:44

## 2023-12-27 ENCOUNTER — HOSPITAL ENCOUNTER (OUTPATIENT)
Dept: WOUND CARE | Age: 38
Discharge: HOME | End: 2023-12-27
Payer: COMMERCIAL

## 2023-12-27 VITALS
WEIGHT: 190 LBS | DIASTOLIC BLOOD PRESSURE: 89 MMHG | HEART RATE: 110 BPM | TEMPERATURE: 97.3 F | SYSTOLIC BLOOD PRESSURE: 136 MMHG | HEIGHT: 73 IN | BODY MASS INDEX: 25.18 KG/M2

## 2023-12-27 DIAGNOSIS — M79.89 RIGHT LEG SWELLING: ICD-10-CM

## 2023-12-27 DIAGNOSIS — F19.10 IV DRUG ABUSE (HCC): Primary | ICD-10-CM

## 2023-12-27 DIAGNOSIS — S81.801D MULTIPLE OPEN WOUNDS OF RIGHT LOWER EXTREMITY, SUBSEQUENT ENCOUNTER: ICD-10-CM

## 2023-12-27 DIAGNOSIS — I73.9 PERIPHERAL ARTERIAL DISEASE (HCC): ICD-10-CM

## 2023-12-27 PROCEDURE — 97605 NEG PRS WND THER DME<=50SQCM: CPT

## 2023-12-27 PROCEDURE — 99213 OFFICE O/P EST LOW 20 MIN: CPT | Performed by: STUDENT IN AN ORGANIZED HEALTH CARE EDUCATION/TRAINING PROGRAM

## 2023-12-27 RX ORDER — LIDOCAINE HYDROCHLORIDE 20 MG/ML
JELLY TOPICAL ONCE
OUTPATIENT
Start: 2023-12-27 | End: 2023-12-27

## 2023-12-27 RX ORDER — LIDOCAINE HYDROCHLORIDE 40 MG/ML
SOLUTION TOPICAL ONCE
OUTPATIENT
Start: 2023-12-27 | End: 2023-12-27

## 2023-12-27 RX ORDER — LIDOCAINE HYDROCHLORIDE 20 MG/ML
JELLY TOPICAL ONCE
Status: COMPLETED | OUTPATIENT
Start: 2023-12-27 | End: 2023-12-27

## 2023-12-27 RX ADMIN — LIDOCAINE HYDROCHLORIDE 6 ML: 20 JELLY TOPICAL at 09:42

## 2023-12-27 NOTE — PROGRESS NOTES
[address] [place] [address]   Progress Note and Procedure Note      Christi Collet  MEDICAL RECORD NUMBER:  [id number]  AGE: 41 y.o. GENDER: male  : 1985  EPISODE DATE:  2023    Subjective:     Chief Complaint   Patient presents with    Wound Check     Rle           HISTORY of PRESENT ILLNESS HPI     Christi Collet is a 41 y.o. male who presents today for wound/ulcer evaluation. Developed IV drug injection site related wounds on the anterior part of his leg at and above the ankle of right leg. He was taken by podiatry to OR on 23 and skin graft substitute was placed. He is being treated with wound vac. Has few days of antibiotics left. PAST MEDICAL HISTORY        Diagnosis Date    Asthma     MRSA (methicillin resistant staph aureus) culture positive 2014    leg    Seizures ([address])     Viral hepatitis C        PAST SURGICAL HISTORY    Past Surgical History:   Procedure Laterality Date    FOOT DEBRIDEMENT Right 2023    LOWER EXTREMITY I&D AND APPLICATION OF GRAFT AND WOUND VAC PLACEMENT performed by Bryan Martel DPM at [facility] Right 2023    LOWER EXTREMITY I&D AND APPLICATION OF GRAFT - Right       FAMILY HISTORY    History reviewed. No pertinent family history.     SOCIAL HISTORY    Social History     Tobacco Use    Smoking status: Former     Current packs/day: 0.00     Types: Cigarettes     Quit date: 2021     Years since quittin.0    Smokeless tobacco: Never   Vaping Use    Vaping Use: Every day   Substance Use Topics    Alcohol use: No    Drug use: No     Comment: clean for 5 years       ALLERGIES    Allergies   Allergen Reactions    Hydrocodone Itching       MEDICATIONS    Current Outpatient Medications on File Prior to Encounter   Medication Sig Dispense Refill    sertraline (ZOLOFT) 50 MG tablet Take 1 tablet by mouth daily PATIENT TAKES WITH 100MG TABLET FOR TOTAL DOSE OF 150MG      mirtazapine
Guidelines      Electronically signed by Ying Espinosa RN on 12/27/2023 at 10:26 AM

## 2023-12-29 NOTE — PROGRESS NOTES
Physician Progress Note      PATIENT:               Edy Romero  [facility] #:                  [id number]  :                       1985  ADMIT DATE:       2023 9:54 AM  DISCH DATE:        2023 6:13 PM  RESPONDING  PROVIDER #:        [id number] TEXT:    Patient admitted with right lower extremity cellulitis. Noted documentation of   sepsis in IM PN 12/10 under active problems through  under resolved. In   order to support the diagnosis of sepsis, please include additional clinical   indicators in your documentation. Or please document if the diagnosis of   sepsis has been ruled out after further study    The medical record reflects the following:  Risk Factors: History of IV drug use, HTN, MRSA carrier  Clinical Indicators: on ; Temp 96.8 (36.0)   on - ;>110  RR   23>9, WBC 7.1>7.4>7.5>9.3>5.6   CRP 11.1  LA 2.4, ESR 74 , per H&P; Swelling,   tenderness and signs of injury (2 x open wounds to RLE distal leg/foot, black   eschar and purulent material, erythema thickening surrounding wounds)   present. right foot and leg x-rays showed soft tissue swelling without osseous   abnormalities. Culture Foot; STAPHYLOCOCCUS AUREUS , MRSA, DNA, Nasal;   positive  Treatment: Vanco and Zosyn IV,  1 L bolus of NS, debridement and wound vac,   Blood cultures  1 and 2 - no growth,    Thank you, Please call if questions  Bernice Horvath RN [facility]  [phone]  Options provided:  -- Sepsis present as evidenced by, Please document evidence. -- Sepsis was ruled out after study  -- Other - I will add my own diagnosis  -- Disagree - Not applicable / Not valid  -- Disagree - Clinically unable to determine / Unknown  -- Refer to Clinical Documentation Reviewer    PROVIDER RESPONSE TEXT:    Sepsis was ruled out after study.     Query created by: Cheryle Sullivan on 2023 7:46 AM      Electronically signed by:  Pearline Cheadle 2023 8:36 AM

## 2024-01-03 ENCOUNTER — OFFICE VISIT (OUTPATIENT)
Dept: PODIATRY | Age: 39
End: 2024-01-03
Payer: COMMERCIAL

## 2024-01-03 VITALS
HEART RATE: 82 BPM | BODY MASS INDEX: 25.73 KG/M2 | DIASTOLIC BLOOD PRESSURE: 90 MMHG | SYSTOLIC BLOOD PRESSURE: 162 MMHG | WEIGHT: 190 LBS | HEIGHT: 72 IN

## 2024-01-03 DIAGNOSIS — L97.913 NON-HEALING ULCER OF LOWER LEG, RIGHT, WITH NECROSIS OF MUSCLE (HCC): ICD-10-CM

## 2024-01-03 DIAGNOSIS — L97.912 ULCERS OF BOTH LOWER EXTREMITIES WITH FAT LAYER EXPOSED (HCC): Primary | ICD-10-CM

## 2024-01-03 DIAGNOSIS — L97.922 ULCERS OF BOTH LOWER EXTREMITIES WITH FAT LAYER EXPOSED (HCC): Primary | ICD-10-CM

## 2024-01-03 PROCEDURE — G8419 CALC BMI OUT NRM PARAM NOF/U: HCPCS

## 2024-01-03 PROCEDURE — 1036F TOBACCO NON-USER: CPT

## 2024-01-03 PROCEDURE — 99213 OFFICE O/P EST LOW 20 MIN: CPT | Performed by: PODIATRIST

## 2024-01-03 PROCEDURE — 99213 OFFICE O/P EST LOW 20 MIN: CPT

## 2024-01-03 PROCEDURE — G8484 FLU IMMUNIZE NO ADMIN: HCPCS

## 2024-01-03 PROCEDURE — G8427 DOCREV CUR MEDS BY ELIG CLIN: HCPCS

## 2024-01-03 PROCEDURE — 1111F DSCHRG MED/CURRENT MED MERGE: CPT

## 2024-01-03 NOTE — PROGRESS NOTES
[facility]  [address]   [address] [place] [address]  Tel: [phone]   Fax: [phone]    Subjective     CC: Postop visit for wound debridement with skin graft (DOS 12/9/2023)  POV #2    Interval history:  Patient returns today for his lower extremity wound assessment.  Patient is about 1 month out from application of Integra bilayer graft after debridement of his ulcerations.  Patient states she has been living in a clinic and has been sober and clean since exiting the hospital.  Patient had a wound VAC on his right lower extremity since his last visit which has been for over a week.  He is unable to get home health care out to his house due to insurance.  Patient also states he has been seeing Dr. Patel at [facility] for wound care but his appointments have been inconsistent.  Patient relates improved swelling and pain to the right lower extremity.  Wound VAC intact and running at 125 mmHg continuous pressure.    HPI:  Kole Vega is a 38 y.o. year old male who presents to clinic today for postoperative visit for placement of skin graft with wound VAC on 12/9/2023.  Patient states that he has been compliant with restrictions including keeping wound dressing clean and dry and intact at all times, changing filter on wound VAC as needed.  Patient states that he is currently partially weightbearing.  Patient denies any current pain, or any problems with the wound VAC.  Patient denies any signs of infection including nausea, vomiting, fever, chills, shortness of breath.  Patient to begin seeing wound clinic on 12/21/2023.  Primary care physician is File, Not On, FNP.    ROS:    Constitutional: Denies nausea, vomiting, fever, chills.  Neurologic: Denies numbness, tingling, and burning in the feet.    Vascular: Denies symptoms of lower extremity claudication.    Skin: Wounds to right lower extremity wounds  Otherwise negative except as noted in the HPI.     PMH:  Past Medical History:

## 2024-01-08 ENCOUNTER — OFFICE VISIT (OUTPATIENT)
Dept: PODIATRY | Age: 39
End: 2024-01-08
Payer: COMMERCIAL

## 2024-01-08 VITALS
DIASTOLIC BLOOD PRESSURE: 84 MMHG | SYSTOLIC BLOOD PRESSURE: 139 MMHG | HEART RATE: 92 BPM | WEIGHT: 190 LBS | BODY MASS INDEX: 25.77 KG/M2

## 2024-01-08 DIAGNOSIS — L97.913 NON-HEALING ULCER OF LOWER LEG, RIGHT, WITH NECROSIS OF MUSCLE (HCC): Primary | ICD-10-CM

## 2024-01-08 DIAGNOSIS — L03.115 CELLULITIS OF LEG, RIGHT: ICD-10-CM

## 2024-01-08 PROCEDURE — G8419 CALC BMI OUT NRM PARAM NOF/U: HCPCS

## 2024-01-08 PROCEDURE — G8427 DOCREV CUR MEDS BY ELIG CLIN: HCPCS

## 2024-01-08 PROCEDURE — G8484 FLU IMMUNIZE NO ADMIN: HCPCS

## 2024-01-08 PROCEDURE — 15271 SKIN SUB GRAFT TRNK/ARM/LEG: CPT

## 2024-01-08 PROCEDURE — 97597 DBRDMT OPN WND 1ST 20 CM/<: CPT

## 2024-01-08 PROCEDURE — 15271 SKIN SUB GRAFT TRNK/ARM/LEG: CPT | Performed by: PODIATRIST

## 2024-01-08 PROCEDURE — 1111F DSCHRG MED/CURRENT MED MERGE: CPT

## 2024-01-08 PROCEDURE — 1036F TOBACCO NON-USER: CPT

## 2024-01-08 PROCEDURE — 99213 OFFICE O/P EST LOW 20 MIN: CPT

## 2024-01-08 PROCEDURE — 99213 OFFICE O/P EST LOW 20 MIN: CPT | Performed by: PODIATRIST

## 2024-01-08 NOTE — PROGRESS NOTES
[facility]  [address]   [address] [place] [address]  Tel: [phone]   Fax: [phone]    Subjective     CC: Postop visit for wound debridement with skin graft (DOS 12/9/2023)  POV #3    Interval history:  Patient returns today for his lower extremity wound assessment.  Patient is about 1 month out from application of Integra bilayer graft after debridement of his ulcerations.  Patient was in last week for wound VAC replacement and wound assessment.  At that time he showed good interval healing with 2 wounds still remaining.  Today patient states he has been compliant and left the wound VAC on.  He denies any systemic symptoms.  He is on no antibiotics at this time.  Patient is set for application of PuraPly organogenesis graft in clinic to facilitate further healing.    HPI:  Kole Vega is a 38 y.o. year old male who presents to clinic today for postoperative visit for placement of skin graft with wound VAC on 12/9/2023.  Patient states that he has been compliant with restrictions including keeping wound dressing clean and dry and intact at all times, changing filter on wound VAC as needed.  Patient states that he is currently partially weightbearing.  Patient denies any current pain, or any problems with the wound VAC.  Patient denies any signs of infection including nausea, vomiting, fever, chills, shortness of breath.  Patient to begin seeing wound clinic on 12/21/2023.  Primary care physician is File, Not On, FNP.    ROS:    Constitutional: Denies nausea, vomiting, fever, chills.  Neurologic: Denies numbness, tingling, and burning in the feet.    Vascular: Denies symptoms of lower extremity claudication.    Skin: Wounds to right lower extremity wounds  Otherwise negative except as noted in the HPI.     PMH:  Past Medical History:   Diagnosis Date    Asthma     MRSA (methicillin resistant staph aureus) culture positive 9/1/2014    leg    Seizures (HCC)     Viral hepatitis C

## 2024-01-17 ENCOUNTER — OFFICE VISIT (OUTPATIENT)
Dept: PODIATRY | Age: 39
End: 2024-01-17
Payer: COMMERCIAL

## 2024-01-17 VITALS
SYSTOLIC BLOOD PRESSURE: 158 MMHG | WEIGHT: 190 LBS | BODY MASS INDEX: 25.73 KG/M2 | HEIGHT: 72 IN | HEART RATE: 130 BPM | DIASTOLIC BLOOD PRESSURE: 94 MMHG

## 2024-01-17 DIAGNOSIS — L97.913 NON-HEALING ULCER OF LOWER LEG, RIGHT, WITH NECROSIS OF MUSCLE (HCC): Primary | ICD-10-CM

## 2024-01-17 DIAGNOSIS — L03.115 CELLULITIS OF LEG, RIGHT: ICD-10-CM

## 2024-01-17 PROCEDURE — 97597 DBRDMT OPN WND 1ST 20 CM/<: CPT

## 2024-01-17 PROCEDURE — 15271 SKIN SUB GRAFT TRNK/ARM/LEG: CPT

## 2024-01-17 NOTE — PROGRESS NOTES
[facility]  [address]   [address] [place] [address]  Tel: [phone]   Fax: [phone]    Subjective     CC: Postop visit for wound debridement with skin graft (DOS 12/9/2023)  POV #4    Interval history:  Patient returns today for assessment of his right leg lower extremity wounds.  He underwent PuraPly graft application last week with reapplication of his wound VAC.  Patient states he did change at home twice due to the smell.  On examination the wounds are healing well.  Patient denies any new complaints other than some itching to the site.    HPI:  Kole Vega is a 38 y.o. year old male who presents to clinic today for postoperative visit for placement of skin graft with wound VAC on 12/9/2023.  Patient states that he has been compliant with restrictions including keeping wound dressing clean and dry and intact at all times, changing filter on wound VAC as needed.  Patient states that he is currently partially weightbearing.  Patient denies any current pain, or any problems with the wound VAC.  Patient denies any signs of infection including nausea, vomiting, fever, chills, shortness of breath.  Patient to begin seeing wound clinic on 12/21/2023.  Primary care physician is File, Not On, FNP.    ROS:    Constitutional: Denies nausea, vomiting, fever, chills.  Neurologic: Denies numbness, tingling, and burning in the feet.    Vascular: Denies symptoms of lower extremity claudication.    Skin: Wounds to right lower extremity.  Otherwise negative except as noted in the HPI.     PMH:  Past Medical History:   Diagnosis Date    Asthma     MRSA (methicillin resistant staph aureus) culture positive 9/1/2014    leg    Seizures (HCC)     Viral hepatitis C        Surgical History:   Past Surgical History:   Procedure Laterality Date    FOOT DEBRIDEMENT Right 12/11/2023    LOWER EXTREMITY I&D AND APPLICATION OF GRAFT AND WOUND VAC PLACEMENT performed by Ally Garcia DPM at [facility] OR

## 2024-01-31 ENCOUNTER — OFFICE VISIT (OUTPATIENT)
Dept: PODIATRY | Age: 39
End: 2024-01-31
Payer: COMMERCIAL

## 2024-01-31 VITALS
HEART RATE: 91 BPM | WEIGHT: 190 LBS | HEIGHT: 72 IN | SYSTOLIC BLOOD PRESSURE: 107 MMHG | BODY MASS INDEX: 25.73 KG/M2 | DIASTOLIC BLOOD PRESSURE: 78 MMHG

## 2024-01-31 DIAGNOSIS — L97.913 NON-HEALING ULCER OF LOWER LEG, RIGHT, WITH NECROSIS OF MUSCLE (HCC): Primary | ICD-10-CM

## 2024-01-31 DIAGNOSIS — L97.922 ULCERS OF BOTH LOWER EXTREMITIES WITH FAT LAYER EXPOSED (HCC): ICD-10-CM

## 2024-01-31 DIAGNOSIS — L97.912 ULCERS OF BOTH LOWER EXTREMITIES WITH FAT LAYER EXPOSED (HCC): ICD-10-CM

## 2024-01-31 DIAGNOSIS — L03.115 CELLULITIS OF LEG, RIGHT: ICD-10-CM

## 2024-01-31 PROCEDURE — 1036F TOBACCO NON-USER: CPT

## 2024-01-31 PROCEDURE — 15275 SKIN SUB GRAFT FACE/NK/HF/G: CPT

## 2024-01-31 PROCEDURE — 15275 SKIN SUB GRAFT FACE/NK/HF/G: CPT | Performed by: PODIATRIST

## 2024-01-31 PROCEDURE — G8427 DOCREV CUR MEDS BY ELIG CLIN: HCPCS

## 2024-01-31 PROCEDURE — G8484 FLU IMMUNIZE NO ADMIN: HCPCS

## 2024-01-31 PROCEDURE — 99213 OFFICE O/P EST LOW 20 MIN: CPT

## 2024-01-31 PROCEDURE — G8419 CALC BMI OUT NRM PARAM NOF/U: HCPCS

## 2024-02-05 ENCOUNTER — OFFICE VISIT (OUTPATIENT)
Dept: PODIATRY | Age: 39
End: 2024-02-05
Payer: COMMERCIAL

## 2024-02-05 VITALS
BODY MASS INDEX: 25.73 KG/M2 | HEART RATE: 95 BPM | DIASTOLIC BLOOD PRESSURE: 93 MMHG | HEIGHT: 72 IN | WEIGHT: 190 LBS | SYSTOLIC BLOOD PRESSURE: 155 MMHG

## 2024-02-05 DIAGNOSIS — L97.913 NON-HEALING ULCER OF LOWER LEG, RIGHT, WITH NECROSIS OF MUSCLE (HCC): ICD-10-CM

## 2024-02-05 DIAGNOSIS — L97.912 ULCER OF RIGHT LOWER EXTREMITY WITH FAT LAYER EXPOSED (HCC): Primary | ICD-10-CM

## 2024-02-05 PROCEDURE — G8484 FLU IMMUNIZE NO ADMIN: HCPCS | Performed by: PODIATRIST

## 2024-02-05 PROCEDURE — 99214 OFFICE O/P EST MOD 30 MIN: CPT | Performed by: PODIATRIST

## 2024-02-05 PROCEDURE — G8427 DOCREV CUR MEDS BY ELIG CLIN: HCPCS | Performed by: PODIATRIST

## 2024-02-05 PROCEDURE — 99213 OFFICE O/P EST LOW 20 MIN: CPT | Performed by: PODIATRIST

## 2024-02-05 PROCEDURE — G8419 CALC BMI OUT NRM PARAM NOF/U: HCPCS | Performed by: PODIATRIST

## 2024-02-05 PROCEDURE — 1036F TOBACCO NON-USER: CPT | Performed by: PODIATRIST

## 2024-02-05 PROCEDURE — 17250 CHEM CAUT OF GRANLTJ TISSUE: CPT | Performed by: PODIATRIST

## 2024-02-05 PROCEDURE — 11042 DBRDMT SUBQ TIS 1ST 20SQCM/<: CPT | Performed by: PODIATRIST

## 2024-02-05 NOTE — PROGRESS NOTES
[facility]  [address]   [address] [place] [address]  Tel: [phone]   Fax: [phone]    Subjective     CC: Postop visit for wound debridement with skin graft (DOS 12/9/2023)  POV #4    Interval history:  Patient returns today for assessment of his right leg lower extremity wounds.  He underwent PuraPly graft application last week with reapplication of his wound VAC.  Patient states he did change at home twice due to the smell.  On examination the wounds are healing well.  Patient denies any new complaints other than some itching to the site.    HPI:  Kole Vega is a 38 y.o. year old male who presents to clinic today for postoperative visit for placement of skin graft with wound VAC on 12/9/2023.  Patient states that he has been compliant with restrictions including keeping wound dressing clean and dry and intact at all times, changing filter on wound VAC as needed.  Patient states that he is currently partially weightbearing.  Patient denies any current pain, or any problems with the wound VAC.  Patient denies any signs of infection including nausea, vomiting, fever, chills, shortness of breath.  Patient to begin seeing wound clinic on 12/21/2023.  Primary care physician is File, Not On, FNP.    ROS:    Constitutional: Denies nausea, vomiting, fever, chills.  Neurologic: Denies numbness, tingling, and burning in the feet.    Vascular: Denies symptoms of lower extremity claudication.    Skin: Wounds to right lower extremity.  Otherwise negative except as noted in the HPI.     PMH:  Past Medical History:   Diagnosis Date    Asthma     MRSA (methicillin resistant staph aureus) culture positive 9/1/2014    leg    Seizures (HCC)     Viral hepatitis C        Surgical History:   Past Surgical History:   Procedure Laterality Date    FOOT DEBRIDEMENT Right 12/11/2023    LOWER EXTREMITY I&D AND APPLICATION OF GRAFT AND WOUND VAC PLACEMENT performed by Ally Garcia DPM at [facility] OR

## 2024-02-06 NOTE — PROGRESS NOTES
Patient instructed to remove shoes and socks and instructed to sit in exam chair.  Current PCP is File, Not On, FNP and date of last visit was .   Do you have a follow up visit scheduled?  No  If yes, the date is     
MD Pedro   diphenhydrAMINE (BENADRYL) 25 MG capsule Take 1 capsule by mouth every 6 hours as needed for Itching for up to 30 doses.  Patient not taking: Reported on 12/9/2023 10/9/14   Nicole Chaparro PA-C       Objective     Vitals:    01/31/24 1528   BP: 107/78   Pulse: 91       No results found for: \"LABA1C\"    Physical Exam:  General:  Alert and oriented x3. In no acute distress.     Lower Extremity Physical Exam:    Vascular: DP and PT pulses are  palpable, Bilateral. CFT <5 seconds to all digits, Bilateral.  No edema, Bilateral.  Hair growth is present to the level of the digits, Bilateral.     Neuro: Saph/sural/SP/DP/plantar sensation intact    Musculoskeletal: EHL/FHL/GS/TA gross motor intact.  No tenderness to palpation or gross deformity noted.    Dermatologic: Two open lesions to right lower leg measuring approximately 3 cm x 1.5 cm x 0.1 cm on the more proximal wound and 1.0 cm x 1.0 cm x 0.1 cm on the more distal wound.  Bases are granular and prepped for graft application.  No drainage is noted.  No contraction of skin noted with epithelization.    Clinical images from 1/31/2024              Imaging:   none      Assessment   Kole Vega is a 38 y.o. male with     Diagnosis Orders   1. Non-healing ulcer of lower leg, right, with necrosis of muscle (HCC)        2. Ulcers of both lower extremities with fat layer exposed (HCC)        3. Cellulitis of leg, right                 Plan   A comprehensive history and physical examination were preformed. The patient was educated on clinical and radiographic findings, diagnosis and treatment plans. Patient states that he understands all that has been explained and all questions were answered to his apparent satisfaction.   Patient to continue with wound VAC therapy per wound care instructions.  Patient to keep dressing clean dry and intact to right lower extremity except for wound care appointments.  We replaced the wound VAC today.  Both wounds

## 2024-02-21 ENCOUNTER — OFFICE VISIT (OUTPATIENT)
Dept: PODIATRY | Age: 39
End: 2024-02-21
Payer: COMMERCIAL

## 2024-02-21 VITALS
BODY MASS INDEX: 25.73 KG/M2 | WEIGHT: 190 LBS | HEART RATE: 128 BPM | HEIGHT: 72 IN | DIASTOLIC BLOOD PRESSURE: 79 MMHG | SYSTOLIC BLOOD PRESSURE: 129 MMHG

## 2024-02-21 DIAGNOSIS — S41.102A OPEN WOUND OF LEFT UPPER ARM, INITIAL ENCOUNTER: ICD-10-CM

## 2024-02-21 DIAGNOSIS — L97.511 ULCER OF FOOT, CHRONIC, RIGHT, LIMITED TO BREAKDOWN OF SKIN (HCC): Primary | ICD-10-CM

## 2024-02-21 PROCEDURE — 99213 OFFICE O/P EST LOW 20 MIN: CPT

## 2024-02-21 PROCEDURE — 99213 OFFICE O/P EST LOW 20 MIN: CPT | Performed by: PODIATRIST

## 2024-02-21 PROCEDURE — G8427 DOCREV CUR MEDS BY ELIG CLIN: HCPCS

## 2024-02-21 PROCEDURE — G8484 FLU IMMUNIZE NO ADMIN: HCPCS

## 2024-02-21 PROCEDURE — G8419 CALC BMI OUT NRM PARAM NOF/U: HCPCS

## 2024-02-21 PROCEDURE — 1036F TOBACCO NON-USER: CPT

## 2024-02-21 RX ORDER — DOXYCYCLINE HYCLATE 100 MG
100 TABLET ORAL 2 TIMES DAILY
Qty: 20 TABLET | Refills: 0 | Status: SHIPPED | OUTPATIENT
Start: 2024-02-21 | End: 2024-03-02

## 2024-03-06 ENCOUNTER — FOLLOWUP TELEPHONE ENCOUNTER (OUTPATIENT)
Dept: WOUND CARE | Age: 39
End: 2024-03-06

## 2024-03-06 NOTE — PROGRESS NOTES
Pt no called no showed for [facility] today, called pt and left voice message asking pt to call clinic back to reschedule.

## 2024-07-15 NOTE — DISCHARGE INSTRUCTIONS
[facility] -Phone: [phone] Fax: [phone]   Visit  Discharge Instructions / Physician Orders    DATE: 7/18/2024     Home Care:      SUPPLIES ORDERED THRU:      Wound Location:      Cleanse with: Liquid antibacterial soap and water, rinse well      Dressing Orders:      Frequency:      Additional Orders: Increase protein to diet (meat, cheese, eggs, fish, peanut butter, nuts and beans)  ELEVATE LEGS AS MUCH AS POSSIBLE    Your next appointment with Wound Care Center is in 1 week     (Please note your next appointment above and if you are unable to keep, kindly give a 24 hour notice. Thank you.)  If more than 15 min late we cannot guarantee you will be seen due to clinician schedule  Per Policy, Excessive cancellation will call for dismissal from program.     If you experience any of the following, please call the Wound Care Center during business hours:  [phone]     * Increase in Pain  * Temperature over 101  * Increase in drainage from your wound  * Drainage with a foul odor  * Bleeding  * Increase in swelling  * Need for compression bandage changes due to slippage, breakthrough drainage.     If you need medical attention outside of the business hours of the Wound Care Centers please contact your PCP or go to the an urgent care or emergency department     The information contained in the After Visit Summary has been reviewed with me, the patient and/or responsible adult, by my health care provider(s). I had the opportunity to ask questions regarding this information. I have elected to receive;      []After Visit Summary  [x]Comprehensive Discharge Instruction      Patient signature______________________________________Date:________

## 2024-07-18 ENCOUNTER — HOSPITAL ENCOUNTER (OUTPATIENT)
Dept: WOUND CARE | Age: 39
Discharge: HOME | End: 2024-07-18

## 2024-10-27 ENCOUNTER — HOSPITAL ENCOUNTER (EMERGENCY)
Age: 39
Discharge: HOME | End: 2024-10-27
Attending: EMERGENCY MEDICINE
Payer: COMMERCIAL

## 2024-10-27 ENCOUNTER — APPOINTMENT (OUTPATIENT)
Dept: CT IMAGING | Age: 39
End: 2024-10-27
Payer: COMMERCIAL

## 2024-10-27 VITALS
WEIGHT: 160 LBS | BODY MASS INDEX: 21.67 KG/M2 | TEMPERATURE: 98.4 F | HEART RATE: 46 BPM | DIASTOLIC BLOOD PRESSURE: 68 MMHG | RESPIRATION RATE: 14 BRPM | SYSTOLIC BLOOD PRESSURE: 98 MMHG | OXYGEN SATURATION: 95 % | HEIGHT: 72 IN

## 2024-10-27 DIAGNOSIS — T40.601A OPIATE OVERDOSE, ACCIDENTAL OR UNINTENTIONAL, INITIAL ENCOUNTER (HCC): Primary | ICD-10-CM

## 2024-10-27 LAB
ALBUMIN SERPL-MCNC: 4.6 G/DL (ref 3.5–5.2)
ALBUMIN/GLOB SERPL: 2 {RATIO} (ref 1–2.5)
ALP SERPL-CCNC: 109 U/L (ref 40–129)
ALT SERPL-CCNC: 9 U/L (ref 10–50)
AMPHET UR QL SCN: NEGATIVE
ANION GAP SERPL CALCULATED.3IONS-SCNC: 12 MMOL/L (ref 9–16)
APAP SERPL-MCNC: <5 UG/ML (ref 10–30)
AST SERPL-CCNC: 28 U/L (ref 10–50)
BARBITURATES UR QL SCN: NEGATIVE
BASOPHILS # BLD: 0.04 K/UL (ref 0–0.2)
BASOPHILS NFR BLD: 1 % (ref 0–2)
BENZODIAZ UR QL: POSITIVE
BILIRUB SERPL-MCNC: 0.4 MG/DL (ref 0–1.2)
BUN SERPL-MCNC: 10 MG/DL (ref 6–20)
CALCIUM SERPL-MCNC: 9.6 MG/DL (ref 8.6–10.4)
CANNABINOIDS UR QL SCN: POSITIVE
CHLORIDE SERPL-SCNC: 104 MMOL/L (ref 98–107)
CO2 SERPL-SCNC: 24 MMOL/L (ref 20–31)
COCAINE UR QL SCN: NEGATIVE
CREAT SERPL-MCNC: 0.9 MG/DL (ref 0.7–1.2)
EOSINOPHIL # BLD: 0.28 K/UL (ref 0–0.44)
EOSINOPHILS RELATIVE PERCENT: 4 % (ref 1–4)
ERYTHROCYTE [DISTWIDTH] IN BLOOD BY AUTOMATED COUNT: 12.1 % (ref 11.8–14.4)
ETHANOL PERCENT: <0.01 %
ETHANOLAMINE SERPL-MCNC: <10 MG/DL (ref 0–0.08)
FENTANYL UR QL: POSITIVE
GFR, ESTIMATED: >90 ML/MIN/1.73M2
GLUCOSE SERPL-MCNC: 108 MG/DL (ref 74–99)
HCT VFR BLD AUTO: 33.2 % (ref 40.7–50.3)
HGB BLD-MCNC: 11 G/DL (ref 13–17)
IMM GRANULOCYTES # BLD AUTO: 0.03 K/UL (ref 0–0.3)
IMM GRANULOCYTES NFR BLD: 0 %
LYMPHOCYTES NFR BLD: 1.66 K/UL (ref 1.1–3.7)
LYMPHOCYTES RELATIVE PERCENT: 22 % (ref 24–43)
MAGNESIUM SERPL-MCNC: 2 MG/DL (ref 1.6–2.6)
MCH RBC QN AUTO: 29.2 PG (ref 25.2–33.5)
MCHC RBC AUTO-ENTMCNC: 33.1 G/DL (ref 28.4–34.8)
MCV RBC AUTO: 88.1 FL (ref 82.6–102.9)
METHADONE UR QL: POSITIVE
MONOCYTES NFR BLD: 0.45 K/UL (ref 0.1–1.2)
MONOCYTES NFR BLD: 6 % (ref 3–12)
NEUTROPHILS NFR BLD: 67 % (ref 36–65)
NEUTS SEG NFR BLD: 5.07 K/UL (ref 1.5–8.1)
NRBC BLD-RTO: 0 PER 100 WBC
OPIATES UR QL SCN: POSITIVE
OXYCODONE UR QL SCN: NEGATIVE
PCP UR QL SCN: NEGATIVE
PLATELET # BLD AUTO: 285 K/UL (ref 138–453)
PMV BLD AUTO: 10.1 FL (ref 8.1–13.5)
POTASSIUM SERPL-SCNC: 4 MMOL/L (ref 3.7–5.3)
PROT SERPL-MCNC: 7.6 G/DL (ref 6.6–8.7)
RBC # BLD AUTO: 3.77 M/UL (ref 4.21–5.77)
SALICYLATES SERPL-MCNC: 0.6 MG/DL (ref 0–10)
SODIUM SERPL-SCNC: 140 MMOL/L (ref 136–145)
TEST INFORMATION: ABNORMAL
WBC OTHER # BLD: 7.5 K/UL (ref 3.5–11.3)

## 2024-10-27 PROCEDURE — 83735 ASSAY OF MAGNESIUM: CPT

## 2024-10-27 PROCEDURE — 70450 CT HEAD/BRAIN W/O DYE: CPT

## 2024-10-27 PROCEDURE — 6370000000 HC RX 637 (ALT 250 FOR IP)

## 2024-10-27 PROCEDURE — G0480 DRUG TEST DEF 1-7 CLASSES: HCPCS

## 2024-10-27 PROCEDURE — 80307 DRUG TEST PRSMV CHEM ANLYZR: CPT

## 2024-10-27 PROCEDURE — 93005 ELECTROCARDIOGRAM TRACING: CPT

## 2024-10-27 PROCEDURE — 85025 COMPLETE CBC W/AUTO DIFF WBC: CPT

## 2024-10-27 PROCEDURE — 80143 DRUG ASSAY ACETAMINOPHEN: CPT

## 2024-10-27 PROCEDURE — 99284 EMERGENCY DEPT VISIT MOD MDM: CPT

## 2024-10-27 PROCEDURE — 80179 DRUG ASSAY SALICYLATE: CPT

## 2024-10-27 PROCEDURE — 80053 COMPREHEN METABOLIC PANEL: CPT

## 2024-10-27 ASSESSMENT — LIFESTYLE VARIABLES
HOW MANY STANDARD DRINKS CONTAINING ALCOHOL DO YOU HAVE ON A TYPICAL DAY: PATIENT DOES NOT DRINK
HOW OFTEN DO YOU HAVE A DRINK CONTAINING ALCOHOL: NEVER

## 2024-10-27 ASSESSMENT — PAIN - FUNCTIONAL ASSESSMENT: PAIN_FUNCTIONAL_ASSESSMENT: NONE - DENIES PAIN
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
## 2024-10-27 NOTE — ED NOTES
Pt. Resting in bed, eyes closed, nad. Pt responding to verbal stimuli. Pt denies any needs at this time. Pt provided another blanket. Resp even and non labored. Pt remains on full cardiac monitor. Will continue with plan of care.
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
## 2024-10-28 LAB
EKG ATRIAL RATE: 50 BPM
EKG P AXIS: 57 DEGREES
EKG P-R INTERVAL: 172 MS
EKG Q-T INTERVAL: 458 MS
EKG QRS DURATION: 88 MS
EKG QTC CALCULATION (BAZETT): 417 MS
EKG R AXIS: 75 DEGREES
EKG T AXIS: 44 DEGREES
EKG VENTRICULAR RATE: 50 BPM

## 2024-10-28 PROCEDURE — 93010 ELECTROCARDIOGRAM REPORT: CPT | Performed by: INTERNAL MEDICINE

## 2024-10-28 ASSESSMENT — ENCOUNTER SYMPTOMS
NAUSEA: 0
COUGH: 0
ABDOMINAL PAIN: 0
DIARRHEA: 0
VOMITING: 0
SHORTNESS OF BREATH: 0

## 2024-10-28 NOTE — DISCHARGE INSTRUCTIONS
You are seen in the ER today.  EMS had to give you some Narcan, you likely had an overdose on opiates.  You are advised not to use any opiates.  Your urine drug screen tested positive for opiates, fentanyl, benzos, cannabis and methadone.  You were provided with a Narcan kit, this could be lifesaving.  Please learn how to use the Narcan kit and teach people around you how to administer it.      Call today or tomorrow to follow up with your PCP in 2 days.  If you not have a PCP have read the number for our family medicine clinic.     Return to the Emergency Department for worsening of pain, fever > 101.5, excessive nausea or vomiting, vomiting any blood, blood in your stool, or any other care or concern.

## 2024-10-28 NOTE — ED NOTES
Discharge instructions reviewed with patient. Writer offered pt resources for detox, but he declined at this time. Pt has no further questions at this time.

## 2024-11-05 ENCOUNTER — APPOINTMENT (OUTPATIENT)
Dept: GENERAL RADIOLOGY | Age: 39
DRG: 720 | End: 2024-11-05
Payer: COMMERCIAL

## 2024-11-05 ENCOUNTER — APPOINTMENT (OUTPATIENT)
Dept: CT IMAGING | Age: 39
DRG: 720 | End: 2024-11-05
Payer: COMMERCIAL

## 2024-11-05 ENCOUNTER — HOSPITAL ENCOUNTER (INPATIENT)
Age: 39
LOS: 7 days | Discharge: HOME | DRG: 720 | End: 2024-11-12
Attending: EMERGENCY MEDICINE | Admitting: INTERNAL MEDICINE
Payer: COMMERCIAL

## 2024-11-05 DIAGNOSIS — J96.02 ACUTE RESPIRATORY FAILURE WITH HYPOXIA AND HYPERCAPNIA: ICD-10-CM

## 2024-11-05 DIAGNOSIS — R41.82 ALTERED MENTAL STATUS, UNSPECIFIED ALTERED MENTAL STATUS TYPE: Primary | ICD-10-CM

## 2024-11-05 DIAGNOSIS — J96.01 ACUTE RESPIRATORY FAILURE WITH HYPOXIA AND HYPERCAPNIA: ICD-10-CM

## 2024-11-05 PROBLEM — R56.9 SEIZURE (HCC): Status: ACTIVE | Noted: 2024-11-05

## 2024-11-05 PROBLEM — T50.901A ACCIDENTAL DRUG OVERDOSE: Status: ACTIVE | Noted: 2024-11-05

## 2024-11-05 PROBLEM — G92.8 TOXIC METABOLIC ENCEPHALOPATHY: Status: ACTIVE | Noted: 2024-11-05

## 2024-11-05 PROBLEM — F13.20 BENZODIAZEPINE DEPENDENCE, CONTINUOUS (HCC): Status: ACTIVE | Noted: 2024-11-05

## 2024-11-05 LAB
ALBUMIN SERPL-MCNC: 4.7 G/DL (ref 3.5–5.2)
ALBUMIN/GLOB SERPL: 1.2 {RATIO} (ref 1–2.5)
ALLEN TEST: POSITIVE
ALLEN TEST: POSITIVE
ALP SERPL-CCNC: 112 U/L (ref 40–129)
ALT SERPL-CCNC: 8 U/L (ref 10–50)
AMPHET UR QL SCN: NEGATIVE
ANION GAP SERPL CALCULATED.3IONS-SCNC: 14 MMOL/L (ref 9–16)
ANION GAP SERPL CALCULATED.3IONS-SCNC: 17 MMOL/L (ref 9–16)
APAP SERPL-MCNC: <5 UG/ML (ref 10–30)
AST SERPL-CCNC: 21 U/L (ref 10–50)
BARBITURATES UR QL SCN: NEGATIVE
BASOPHILS # BLD: 0.04 K/UL (ref 0–0.2)
BASOPHILS NFR BLD: 0 % (ref 0–2)
BENZODIAZ UR QL: POSITIVE
BILIRUB SERPL-MCNC: 0.6 MG/DL (ref 0–1.2)
BILIRUB UR QL STRIP: NEGATIVE
BUN BLD-MCNC: 23 MG/DL (ref 8–26)
BUN SERPL-MCNC: 18 MG/DL (ref 6–20)
BUN SERPL-MCNC: 23 MG/DL (ref 6–20)
CA-I BLD-SCNC: 1.21 MMOL/L (ref 1.15–1.33)
CALCIUM SERPL-MCNC: 10.4 MG/DL (ref 8.6–10.4)
CALCIUM SERPL-MCNC: 8.9 MG/DL (ref 8.6–10.4)
CANNABINOIDS UR QL SCN: POSITIVE
CASTS #/AREA URNS LPF: NORMAL /LPF (ref 0–2)
CASTS #/AREA URNS LPF: NORMAL /LPF (ref 0–2)
CHLORIDE BLD-SCNC: 100 MMOL/L (ref 98–107)
CHLORIDE SERPL-SCNC: 102 MMOL/L (ref 98–107)
CHLORIDE SERPL-SCNC: 95 MMOL/L (ref 98–107)
CLARITY UR: CLEAR
CO2 BLD CALC-SCNC: 31 MMOL/L (ref 22–30)
CO2 SERPL-SCNC: 22 MMOL/L (ref 20–31)
CO2 SERPL-SCNC: 26 MMOL/L (ref 20–31)
COCAINE UR QL SCN: NEGATIVE
COLOR UR: YELLOW
CREAT SERPL-MCNC: 0.7 MG/DL (ref 0.7–1.2)
CREAT SERPL-MCNC: 1 MG/DL (ref 0.7–1.2)
EGFR, POC: >90 ML/MIN/1.73M2
EOSINOPHIL # BLD: <0.03 K/UL (ref 0–0.44)
EOSINOPHILS RELATIVE PERCENT: 0 % (ref 1–4)
EPI CELLS #/AREA URNS HPF: NORMAL /HPF (ref 0–5)
ERYTHROCYTE [DISTWIDTH] IN BLOOD BY AUTOMATED COUNT: 12 % (ref 11.8–14.4)
ERYTHROCYTE [DISTWIDTH] IN BLOOD BY AUTOMATED COUNT: 12.2 % (ref 11.8–14.4)
ETHANOL PERCENT: <0.01 %
ETHANOLAMINE SERPL-MCNC: <10 MG/DL (ref 0–0.08)
FENTANYL UR QL: POSITIVE
FIO2: 100
FIO2: 80
GFR, ESTIMATED: >90 ML/MIN/1.73M2
GFR, ESTIMATED: >90 ML/MIN/1.73M2
GLUCOSE BLD-MCNC: 126 MG/DL (ref 74–100)
GLUCOSE SERPL-MCNC: 106 MG/DL (ref 74–99)
GLUCOSE SERPL-MCNC: 108 MG/DL (ref 74–99)
GLUCOSE UR STRIP-MCNC: NEGATIVE MG/DL
HCO3 VENOUS: 30.4 MMOL/L (ref 22–29)
HCT VFR BLD AUTO: 46.8 % (ref 40.7–50.3)
HCT VFR BLD AUTO: 47 % (ref 41–53)
HCT VFR BLD AUTO: 47.9 % (ref 40.7–50.3)
HGB BLD-MCNC: 14.9 G/DL (ref 13–17)
HGB BLD-MCNC: 15.2 G/DL (ref 13–17)
HGB UR QL STRIP.AUTO: NEGATIVE
IMM GRANULOCYTES # BLD AUTO: 0.15 K/UL (ref 0–0.3)
IMM GRANULOCYTES NFR BLD: 1 %
KETONES UR STRIP-MCNC: ABNORMAL MG/DL
LEUKOCYTE ESTERASE UR QL STRIP: NEGATIVE
LYMPHOCYTES NFR BLD: 1.72 K/UL (ref 1.1–3.7)
LYMPHOCYTES RELATIVE PERCENT: 8 % (ref 24–43)
MAGNESIUM SERPL-MCNC: 2.4 MG/DL (ref 1.6–2.6)
MCH RBC QN AUTO: 28.5 PG (ref 25.2–33.5)
MCH RBC QN AUTO: 28.7 PG (ref 25.2–33.5)
MCHC RBC AUTO-ENTMCNC: 31.1 G/DL (ref 28.4–34.8)
MCHC RBC AUTO-ENTMCNC: 32.5 G/DL (ref 28.4–34.8)
MCV RBC AUTO: 87.8 FL (ref 82.6–102.9)
MCV RBC AUTO: 92.1 FL (ref 82.6–102.9)
METHADONE UR QL: POSITIVE
MODE: ABNORMAL
MONOCYTES NFR BLD: 1.32 K/UL (ref 0.1–1.2)
MONOCYTES NFR BLD: 6 % (ref 3–12)
NEUTROPHILS NFR BLD: 85 % (ref 36–65)
NEUTS SEG NFR BLD: 18.4 K/UL (ref 1.5–8.1)
NITRITE UR QL STRIP: NEGATIVE
NRBC BLD-RTO: 0 PER 100 WBC
NRBC BLD-RTO: 0 PER 100 WBC
O2 DELIVERY DEVICE: ABNORMAL
O2 DELIVERY DEVICE: ABNORMAL
O2 SAT, VEN: 64.7 % (ref 60–85)
OPIATES UR QL SCN: NEGATIVE
OXYCODONE UR QL SCN: NEGATIVE
PATIENT TEMP: 38.9
PCO2 VENOUS: 40.9 MM HG (ref 41–51)
PCP UR QL SCN: NEGATIVE
PH UR STRIP: 7 [PH] (ref 5–8)
PH VENOUS: 7.48 (ref 7.32–7.43)
PLATELET # BLD AUTO: 343 K/UL (ref 138–453)
PLATELET # BLD AUTO: 403 K/UL (ref 138–453)
PMV BLD AUTO: 9.8 FL (ref 8.1–13.5)
PMV BLD AUTO: 9.9 FL (ref 8.1–13.5)
PO2 VENOUS: 31.2 MM HG (ref 30–50)
POC ANION GAP: 12 MMOL/L (ref 7–16)
POC CREATININE: 0.8 MG/DL (ref 0.51–1.19)
POC HCO3: 25.9 MMOL/L (ref 21–28)
POC HCO3: 27.1 MMOL/L (ref 21–28)
POC HEMOGLOBIN (CALC): 16.1 G/DL (ref 13.5–17.5)
POC LACTIC ACID: 1.1 MMOL/L (ref 0.56–1.39)
POC O2 SATURATION: 97.7 % (ref 94–98)
POC O2 SATURATION: 98.5 % (ref 94–98)
POC PCO2 TEMP: 37.2 MM HG
POC PCO2: 34.2 MM HG (ref 35–48)
POC PCO2: 34.2 MM HG (ref 35–48)
POC PH TEMP: 7.48
POC PH: 7.49 (ref 7.35–7.45)
POC PH: 7.51 (ref 7.35–7.45)
POC PO2 TEMP: 114.8 MM HG
POC PO2: 102.7 MM HG (ref 83–108)
POC PO2: 90.5 MM HG (ref 83–108)
POSITIVE BASE EXCESS, ART: 2.8 MMOL/L (ref 0–3)
POSITIVE BASE EXCESS, ART: 4.2 MMOL/L (ref 0–3)
POSITIVE BASE EXCESS, VEN: 6.3 MMOL/L (ref 0–3)
POTASSIUM BLD-SCNC: 3.2 MMOL/L (ref 3.5–4.5)
POTASSIUM SERPL-SCNC: 2.9 MMOL/L (ref 3.7–5.3)
POTASSIUM SERPL-SCNC: 3.4 MMOL/L (ref 3.7–5.3)
POTASSIUM SERPL-SCNC: 3.6 MMOL/L (ref 3.7–5.3)
PROT SERPL-MCNC: 8.7 G/DL (ref 6.6–8.7)
PROT UR STRIP-MCNC: ABNORMAL MG/DL
RBC # BLD AUTO: 5.2 M/UL (ref 4.21–5.77)
RBC # BLD AUTO: 5.33 M/UL (ref 4.21–5.77)
RBC #/AREA URNS HPF: NORMAL /HPF (ref 0–2)
SALICYLATES SERPL-MCNC: <0.5 MG/DL (ref 0–10)
SAMPLE SITE: ABNORMAL
SAMPLE SITE: ABNORMAL
SODIUM BLD-SCNC: 141 MMOL/L (ref 138–146)
SODIUM SERPL-SCNC: 138 MMOL/L (ref 136–145)
SODIUM SERPL-SCNC: 138 MMOL/L (ref 136–145)
SP GR UR STRIP: 1.03 (ref 1–1.03)
TEST INFORMATION: ABNORMAL
TROPONIN I SERPL HS-MCNC: 7 NG/L (ref 0–22)
TROPONIN I SERPL HS-MCNC: 7 NG/L (ref 0–22)
UROBILINOGEN UR STRIP-ACNC: NORMAL EU/DL (ref 0–1)
WBC #/AREA URNS HPF: NORMAL /HPF (ref 0–5)
WBC OTHER # BLD: 11.9 K/UL (ref 3.5–11.3)
WBC OTHER # BLD: 21.6 K/UL (ref 3.5–11.3)

## 2024-11-05 PROCEDURE — 99285 EMERGENCY DEPT VISIT HI MDM: CPT

## 2024-11-05 PROCEDURE — 87205 SMEAR GRAM STAIN: CPT

## 2024-11-05 PROCEDURE — 80051 ELECTROLYTE PANEL: CPT

## 2024-11-05 PROCEDURE — 6370000000 HC RX 637 (ALT 250 FOR IP): Performed by: INTERNAL MEDICINE

## 2024-11-05 PROCEDURE — 80307 DRUG TEST PRSMV CHEM ANLYZR: CPT

## 2024-11-05 PROCEDURE — 89220 SPUTUM SPECIMEN COLLECTION: CPT

## 2024-11-05 PROCEDURE — 2580000003 HC RX 258

## 2024-11-05 PROCEDURE — 95813 EEG EXTND MNTR 61-119 MIN: CPT | Performed by: PSYCHIATRY & NEUROLOGY

## 2024-11-05 PROCEDURE — 94640 AIRWAY INHALATION TREATMENT: CPT

## 2024-11-05 PROCEDURE — 6360000004 HC RX CONTRAST MEDICATION

## 2024-11-05 PROCEDURE — 82565 ASSAY OF CREATININE: CPT

## 2024-11-05 PROCEDURE — 82947 ASSAY GLUCOSE BLOOD QUANT: CPT

## 2024-11-05 PROCEDURE — 80179 DRUG ASSAY SALICYLATE: CPT

## 2024-11-05 PROCEDURE — 6360000002 HC RX W HCPCS

## 2024-11-05 PROCEDURE — 31500 INSERT EMERGENCY AIRWAY: CPT

## 2024-11-05 PROCEDURE — 95700 EEG CONT REC W/VID EEG TECH: CPT

## 2024-11-05 PROCEDURE — 87077 CULTURE AEROBIC IDENTIFY: CPT

## 2024-11-05 PROCEDURE — 80053 COMPREHEN METABOLIC PANEL: CPT

## 2024-11-05 PROCEDURE — 94761 N-INVAS EAR/PLS OXIMETRY MLT: CPT

## 2024-11-05 PROCEDURE — 87185 SC STD ENZYME DETCJ PER NZM: CPT

## 2024-11-05 PROCEDURE — G0480 DRUG TEST DEF 1-7 CLASSES: HCPCS

## 2024-11-05 PROCEDURE — 81001 URINALYSIS AUTO W/SCOPE: CPT

## 2024-11-05 PROCEDURE — 95816 EEG AWAKE AND DROWSY: CPT

## 2024-11-05 PROCEDURE — 71260 CT THORAX DX C+: CPT

## 2024-11-05 PROCEDURE — 71045 X-RAY EXAM CHEST 1 VIEW: CPT

## 2024-11-05 PROCEDURE — 87641 MR-STAPH DNA AMP PROBE: CPT

## 2024-11-05 PROCEDURE — 36416 COLLJ CAPILLARY BLOOD SPEC: CPT

## 2024-11-05 PROCEDURE — 2000000000 HC ICU R&B

## 2024-11-05 PROCEDURE — 80048 BASIC METABOLIC PNL TOTAL CA: CPT

## 2024-11-05 PROCEDURE — 70450 CT HEAD/BRAIN W/O DYE: CPT

## 2024-11-05 PROCEDURE — 2500000003 HC RX 250 WO HCPCS: Performed by: INTERNAL MEDICINE

## 2024-11-05 PROCEDURE — 93005 ELECTROCARDIOGRAM TRACING: CPT

## 2024-11-05 PROCEDURE — 96365 THER/PROPH/DIAG IV INF INIT: CPT

## 2024-11-05 PROCEDURE — 0BH17EZ INSERTION OF ENDOTRACHEAL AIRWAY INTO TRACHEA, VIA NATURAL OR ARTIFICIAL OPENING: ICD-10-PCS | Performed by: EMERGENCY MEDICINE

## 2024-11-05 PROCEDURE — 87040 BLOOD CULTURE FOR BACTERIA: CPT

## 2024-11-05 PROCEDURE — 80143 DRUG ASSAY ACETAMINOPHEN: CPT

## 2024-11-05 PROCEDURE — 84520 ASSAY OF UREA NITROGEN: CPT

## 2024-11-05 PROCEDURE — 84132 ASSAY OF SERUM POTASSIUM: CPT

## 2024-11-05 PROCEDURE — 95711 VEEG 2-12 HR UNMONITORED: CPT

## 2024-11-05 PROCEDURE — 83735 ASSAY OF MAGNESIUM: CPT

## 2024-11-05 PROCEDURE — 99222 1ST HOSP IP/OBS MODERATE 55: CPT | Performed by: STUDENT IN AN ORGANIZED HEALTH CARE EDUCATION/TRAINING PROGRAM

## 2024-11-05 PROCEDURE — 96375 TX/PRO/DX INJ NEW DRUG ADDON: CPT

## 2024-11-05 PROCEDURE — 2500000003 HC RX 250 WO HCPCS

## 2024-11-05 PROCEDURE — 36415 COLL VENOUS BLD VENIPUNCTURE: CPT

## 2024-11-05 PROCEDURE — 83605 ASSAY OF LACTIC ACID: CPT

## 2024-11-05 PROCEDURE — 0DH67UZ INSERTION OF FEEDING DEVICE INTO STOMACH, VIA NATURAL OR ARTIFICIAL OPENING: ICD-10-PCS

## 2024-11-05 PROCEDURE — 99291 CRITICAL CARE FIRST HOUR: CPT | Performed by: INTERNAL MEDICINE

## 2024-11-05 PROCEDURE — 85027 COMPLETE CBC AUTOMATED: CPT

## 2024-11-05 PROCEDURE — 5A1945Z RESPIRATORY VENTILATION, 24-96 CONSECUTIVE HOURS: ICD-10-PCS | Performed by: EMERGENCY MEDICINE

## 2024-11-05 PROCEDURE — 36600 WITHDRAWAL OF ARTERIAL BLOOD: CPT

## 2024-11-05 PROCEDURE — 84484 ASSAY OF TROPONIN QUANT: CPT

## 2024-11-05 PROCEDURE — 82803 BLOOD GASES ANY COMBINATION: CPT

## 2024-11-05 PROCEDURE — 82330 ASSAY OF CALCIUM: CPT

## 2024-11-05 PROCEDURE — 6370000000 HC RX 637 (ALT 250 FOR IP)

## 2024-11-05 PROCEDURE — 87070 CULTURE OTHR SPECIMN AEROBIC: CPT

## 2024-11-05 PROCEDURE — 85014 HEMATOCRIT: CPT

## 2024-11-05 PROCEDURE — 94002 VENT MGMT INPAT INIT DAY: CPT

## 2024-11-05 PROCEDURE — 2700000000 HC OXYGEN THERAPY PER DAY

## 2024-11-05 PROCEDURE — 5A0935Z ASSISTANCE WITH RESPIRATORY VENTILATION, LESS THAN 24 CONSECUTIVE HOURS: ICD-10-PCS | Performed by: EMERGENCY MEDICINE

## 2024-11-05 PROCEDURE — 85025 COMPLETE CBC W/AUTO DIFF WBC: CPT

## 2024-11-05 PROCEDURE — 74018 RADEX ABDOMEN 1 VIEW: CPT

## 2024-11-05 RX ORDER — ENOXAPARIN SODIUM 100 MG/ML
40 INJECTION SUBCUTANEOUS DAILY
Status: DISCONTINUED | OUTPATIENT
Start: 2024-11-05 | End: 2024-11-05

## 2024-11-05 RX ORDER — POLYETHYLENE GLYCOL 3350 17 G/17G
17 POWDER, FOR SOLUTION ORAL DAILY PRN
Status: DISCONTINUED | OUTPATIENT
Start: 2024-11-05 | End: 2024-11-12 | Stop reason: HOSPADM

## 2024-11-05 RX ORDER — LEVETIRACETAM 10 MG/ML
INJECTION INTRAVASCULAR
Status: COMPLETED
Start: 2024-11-05 | End: 2024-11-05

## 2024-11-05 RX ORDER — SODIUM CHLORIDE 0.9 % (FLUSH) 0.9 %
3 SYRINGE (ML) INJECTION EVERY 8 HOURS
Status: DISCONTINUED | OUTPATIENT
Start: 2024-11-05 | End: 2024-11-05

## 2024-11-05 RX ORDER — POTASSIUM CHLORIDE 7.45 MG/ML
10 INJECTION INTRAVENOUS PRN
Status: DISPENSED | OUTPATIENT
Start: 2024-11-05 | End: 2024-11-09

## 2024-11-05 RX ORDER — FENTANYL CITRATE-0.9 % NACL/PF 10 MCG/ML
25-200 PLASTIC BAG, INJECTION (ML) INTRAVENOUS CONTINUOUS
Status: DISCONTINUED | OUTPATIENT
Start: 2024-11-05 | End: 2024-11-05

## 2024-11-05 RX ORDER — FENTANYL CITRATE-0.9 % NACL/PF 20 MCG/2ML
50 SYRINGE (ML) INTRAVENOUS EVERY 30 MIN PRN
Status: DISCONTINUED | OUTPATIENT
Start: 2024-11-05 | End: 2024-11-12 | Stop reason: HOSPADM

## 2024-11-05 RX ORDER — GABAPENTIN 300 MG/1
300 CAPSULE ORAL 3 TIMES DAILY
Status: DISCONTINUED | OUTPATIENT
Start: 2024-11-05 | End: 2024-11-12 | Stop reason: HOSPADM

## 2024-11-05 RX ORDER — LORAZEPAM 2 MG/ML
INJECTION INTRAMUSCULAR
Status: COMPLETED
Start: 2024-11-05 | End: 2024-11-05

## 2024-11-05 RX ORDER — SODIUM CHLORIDE 9 MG/ML
INJECTION, SOLUTION INTRAVENOUS CONTINUOUS
Status: DISCONTINUED | OUTPATIENT
Start: 2024-11-05 | End: 2024-11-08

## 2024-11-05 RX ORDER — PROPOFOL 10 MG/ML
5-50 INJECTION, EMULSION INTRAVENOUS CONTINUOUS
Status: DISCONTINUED | OUTPATIENT
Start: 2024-11-05 | End: 2024-11-06

## 2024-11-05 RX ORDER — MAGNESIUM SULFATE IN WATER 40 MG/ML
2000 INJECTION, SOLUTION INTRAVENOUS PRN
Status: DISCONTINUED | OUTPATIENT
Start: 2024-11-05 | End: 2024-11-12 | Stop reason: HOSPADM

## 2024-11-05 RX ORDER — 0.9 % SODIUM CHLORIDE 0.9 %
1000 INTRAVENOUS SOLUTION INTRAVENOUS ONCE
Status: COMPLETED | OUTPATIENT
Start: 2024-11-05 | End: 2024-11-05

## 2024-11-05 RX ORDER — ONDANSETRON 2 MG/ML
4 INJECTION INTRAMUSCULAR; INTRAVENOUS EVERY 6 HOURS PRN
Status: DISCONTINUED | OUTPATIENT
Start: 2024-11-05 | End: 2024-11-12 | Stop reason: HOSPADM

## 2024-11-05 RX ORDER — MIDAZOLAM HYDROCHLORIDE 1 MG/ML
1-10 INJECTION, SOLUTION INTRAVENOUS CONTINUOUS
Status: DISCONTINUED | OUTPATIENT
Start: 2024-11-05 | End: 2024-11-05

## 2024-11-05 RX ORDER — TRAZODONE HYDROCHLORIDE 50 MG/1
50 TABLET, FILM COATED ORAL
Status: DISCONTINUED | OUTPATIENT
Start: 2024-11-05 | End: 2024-11-12 | Stop reason: HOSPADM

## 2024-11-05 RX ORDER — DIAZEPAM 5 MG/1
20 TABLET ORAL 2 TIMES DAILY
Status: DISCONTINUED | OUTPATIENT
Start: 2024-11-05 | End: 2024-11-08

## 2024-11-05 RX ORDER — IPRATROPIUM BROMIDE AND ALBUTEROL SULFATE 2.5; .5 MG/3ML; MG/3ML
1 SOLUTION RESPIRATORY (INHALATION)
Status: DISCONTINUED | OUTPATIENT
Start: 2024-11-05 | End: 2024-11-07

## 2024-11-05 RX ORDER — POTASSIUM CHLORIDE 29.8 MG/ML
20 INJECTION INTRAVENOUS PRN
Status: ACTIVE | OUTPATIENT
Start: 2024-11-05 | End: 2024-11-09

## 2024-11-05 RX ORDER — ACETAMINOPHEN 650 MG/1
650 SUPPOSITORY RECTAL EVERY 6 HOURS PRN
Status: DISCONTINUED | OUTPATIENT
Start: 2024-11-05 | End: 2024-11-12 | Stop reason: HOSPADM

## 2024-11-05 RX ORDER — ONDANSETRON 4 MG/1
4 TABLET, ORALLY DISINTEGRATING ORAL EVERY 8 HOURS PRN
Status: DISCONTINUED | OUTPATIENT
Start: 2024-11-05 | End: 2024-11-12 | Stop reason: HOSPADM

## 2024-11-05 RX ORDER — DIAZEPAM 10 MG/1
20 TABLET ORAL 2 TIMES DAILY
COMMUNITY

## 2024-11-05 RX ORDER — IOPAMIDOL 755 MG/ML
75 INJECTION, SOLUTION INTRAVASCULAR
Status: COMPLETED | OUTPATIENT
Start: 2024-11-05 | End: 2024-11-05

## 2024-11-05 RX ORDER — LEVETIRACETAM 10 MG/ML
2000 INJECTION INTRAVASCULAR ONCE
Status: COMPLETED | OUTPATIENT
Start: 2024-11-05 | End: 2024-11-05

## 2024-11-05 RX ORDER — SODIUM CHLORIDE 0.9 % (FLUSH) 0.9 %
5-40 SYRINGE (ML) INJECTION EVERY 12 HOURS SCHEDULED
Status: DISCONTINUED | OUTPATIENT
Start: 2024-11-05 | End: 2024-11-12 | Stop reason: HOSPADM

## 2024-11-05 RX ORDER — FENTANYL CITRATE 50 UG/ML
50 INJECTION, SOLUTION INTRAMUSCULAR; INTRAVENOUS ONCE
Status: DISCONTINUED | OUTPATIENT
Start: 2024-11-05 | End: 2024-11-06

## 2024-11-05 RX ORDER — PROPOFOL 10 MG/ML
INJECTION, EMULSION INTRAVENOUS
Status: COMPLETED
Start: 2024-11-05 | End: 2024-11-05

## 2024-11-05 RX ORDER — LORAZEPAM 2 MG/ML
2 INJECTION INTRAMUSCULAR ONCE
Status: COMPLETED | OUTPATIENT
Start: 2024-11-05 | End: 2024-11-05

## 2024-11-05 RX ORDER — ACETAMINOPHEN 325 MG/1
650 TABLET ORAL EVERY 6 HOURS PRN
Status: DISCONTINUED | OUTPATIENT
Start: 2024-11-05 | End: 2024-11-12 | Stop reason: HOSPADM

## 2024-11-05 RX ORDER — MIDAZOLAM HYDROCHLORIDE 2 MG/2ML
2 INJECTION, SOLUTION INTRAMUSCULAR; INTRAVENOUS ONCE
Status: COMPLETED | OUTPATIENT
Start: 2024-11-05 | End: 2024-11-05

## 2024-11-05 RX ORDER — SODIUM CHLORIDE 0.9 % (FLUSH) 0.9 %
5-40 SYRINGE (ML) INJECTION PRN
Status: DISCONTINUED | OUTPATIENT
Start: 2024-11-05 | End: 2024-11-12 | Stop reason: HOSPADM

## 2024-11-05 RX ORDER — POTASSIUM CHLORIDE 7.45 MG/ML
10 INJECTION INTRAVENOUS
Status: DISPENSED | OUTPATIENT
Start: 2024-11-05 | End: 2024-11-05

## 2024-11-05 RX ORDER — SODIUM CHLORIDE 9 MG/ML
INJECTION, SOLUTION INTRAVENOUS PRN
Status: DISCONTINUED | OUTPATIENT
Start: 2024-11-05 | End: 2024-11-12 | Stop reason: HOSPADM

## 2024-11-05 RX ADMIN — POTASSIUM CHLORIDE 10 MEQ: 7.45 INJECTION INTRAVENOUS at 21:14

## 2024-11-05 RX ADMIN — LORAZEPAM 2 MG: 2 INJECTION INTRAMUSCULAR at 08:30

## 2024-11-05 RX ADMIN — LEVETIRACETAM 2000 MG: 10 INJECTION, SOLUTION INTRAVENOUS at 08:43

## 2024-11-05 RX ADMIN — MIDAZOLAM HYDROCHLORIDE 2 MG: 1 INJECTION, SOLUTION INTRAMUSCULAR; INTRAVENOUS at 20:34

## 2024-11-05 RX ADMIN — IOPAMIDOL 75 ML: 755 INJECTION, SOLUTION INTRAVENOUS at 11:15

## 2024-11-05 RX ADMIN — PROPOFOL 30 MCG/KG/MIN: 10 INJECTION, EMULSION INTRAVENOUS at 08:35

## 2024-11-05 RX ADMIN — PROPOFOL 50 MCG/KG/MIN: 10 INJECTION, EMULSION INTRAVENOUS at 11:37

## 2024-11-05 RX ADMIN — Medication 50 MCG/HR: at 14:39

## 2024-11-05 RX ADMIN — PROPOFOL 50 MCG/KG/MIN: 10 INJECTION, EMULSION INTRAVENOUS at 16:04

## 2024-11-05 RX ADMIN — SODIUM CHLORIDE 1000 ML: 9 INJECTION, SOLUTION INTRAVENOUS at 08:25

## 2024-11-05 RX ADMIN — IPRATROPIUM BROMIDE AND ALBUTEROL SULFATE 1 DOSE: .5; 2.5 SOLUTION RESPIRATORY (INHALATION) at 19:36

## 2024-11-05 RX ADMIN — FENTANYL CITRATE 50 MCG: 50 INJECTION, SOLUTION INTRAMUSCULAR; INTRAVENOUS at 15:05

## 2024-11-05 RX ADMIN — PROPOFOL 50 MCG/KG/MIN: 10 INJECTION, EMULSION INTRAVENOUS at 20:52

## 2024-11-05 RX ADMIN — SODIUM CHLORIDE 3000 MG: 900 INJECTION INTRAVENOUS at 15:52

## 2024-11-05 RX ADMIN — LEVETIRACETAM 2000 MG: 10 INJECTION INTRAVASCULAR at 08:43

## 2024-11-05 RX ADMIN — LORAZEPAM 2 MG: 2 INJECTION INTRAMUSCULAR; INTRAVENOUS at 08:30

## 2024-11-05 RX ADMIN — POTASSIUM CHLORIDE 10 MEQ: 7.45 INJECTION INTRAVENOUS at 20:08

## 2024-11-05 RX ADMIN — ACETAMINOPHEN 650 MG: 325 TABLET ORAL at 14:49

## 2024-11-05 RX ADMIN — DIAZEPAM 20 MG: 5 TABLET ORAL at 16:40

## 2024-11-05 RX ADMIN — SODIUM CHLORIDE, PRESERVATIVE FREE 20 ML: 5 INJECTION INTRAVENOUS at 14:50

## 2024-11-05 RX ADMIN — SERTRALINE HYDROCHLORIDE 150 MG: 50 TABLET ORAL at 14:49

## 2024-11-05 RX ADMIN — POTASSIUM CHLORIDE 10 MEQ: 7.45 INJECTION INTRAVENOUS at 23:16

## 2024-11-05 RX ADMIN — PROPOFOL 30 MCG/KG/MIN: 10 INJECTION, EMULSION INTRAVENOUS at 08:29

## 2024-11-05 RX ADMIN — ENOXAPARIN SODIUM 40 MG: 100 INJECTION SUBCUTANEOUS at 14:50

## 2024-11-05 RX ADMIN — POTASSIUM CHLORIDE 10 MEQ: 7.45 INJECTION INTRAVENOUS at 18:07

## 2024-11-05 RX ADMIN — GABAPENTIN 300 MG: 300 CAPSULE ORAL at 14:49

## 2024-11-05 RX ADMIN — SODIUM CHLORIDE, PRESERVATIVE FREE 10 ML: 5 INJECTION INTRAVENOUS at 20:13

## 2024-11-05 RX ADMIN — SODIUM CHLORIDE: 9 INJECTION, SOLUTION INTRAVENOUS at 12:13

## 2024-11-05 RX ADMIN — PANTOPRAZOLE SODIUM 40 MG: 40 INJECTION, POWDER, FOR SOLUTION INTRAVENOUS at 16:40

## 2024-11-05 RX ADMIN — IPRATROPIUM BROMIDE AND ALBUTEROL SULFATE 1 DOSE: .5; 2.5 SOLUTION RESPIRATORY (INHALATION) at 15:49

## 2024-11-05 RX ADMIN — POTASSIUM CHLORIDE 10 MEQ: 7.45 INJECTION INTRAVENOUS at 22:15

## 2024-11-05 RX ADMIN — GABAPENTIN 300 MG: 300 CAPSULE ORAL at 20:11

## 2024-11-05 ASSESSMENT — PULMONARY FUNCTION TESTS
PIF_VALUE: 30
PIF_VALUE: 28
PIF_VALUE: 21
PIF_VALUE: 16
PIF_VALUE: 19
PIF_VALUE: 30

## 2024-11-05 ASSESSMENT — LIFESTYLE VARIABLES
HOW OFTEN DO YOU HAVE A DRINK CONTAINING ALCOHOL: NEVER
HOW MANY STANDARD DRINKS CONTAINING ALCOHOL DO YOU HAVE ON A TYPICAL DAY: PATIENT DOES NOT DRINK

## 2024-11-05 NOTE — ED NOTES
Labeled urine specimen sent to lab via tube system.     [Mother] : mother [Breast milk] : breast milk [Formula ___ oz/feed] : [unfilled] oz of formula per feed [___ stools every other day] : [unfilled]  stools every other day [___ voids per day] : [unfilled] voids per day [Normal] : Normal [No] : No cigarette smoke exposure [Rear facing car seat in back seat] : Rear facing car seat in back seat [Water heater temperature set at <120 degrees F] : Water heater temperature set at <120 degrees F [Carbon Monoxide Detectors] : Carbon monoxide detectors [Smoke Detectors] : Smoke detectors [Up to date] : Up to date [Infant walker] : No Infant walker [At risk for exposure to TB] : Not at risk for exposure to Tuberculosis  [At risk for exposure to lead] : Not at risk for exposure to lead  [Gun in Home] : No gun in home

## 2024-11-05 NOTE — ED NOTES
Per Eunice Caballero, EEG Pt is ok to be unhooked from monitor to go to CT then she will come back to bedside to rehook pt up to EEG.

## 2024-11-05 NOTE — ED NOTES
Labeled blood specimens sent to lab via tube system.    [x] Green/yellow  [x] Lavender   [] on ice   [x] Blue   [x] Green/black [] on ice  [] Yellow  [] on ice  [x] Red  [] Pink  [] Blood Cultures  [] x1 [] X2    [] Ped Green  [] on ice  [] Ped Lavender  [] on ice    [] Ped Yellow  [] on ice  [] Ped Red

## 2024-11-05 NOTE — PROCEDURES
PROCEDURE NOTE  Date: 11/5/2024   Name: Kole Vega  YOB: 1985    Procedures            Date: 11/5/2024  Referring physician: Dr. Live    Indication  Patient aged 39 y with encephalopathy. EEG done to assess for epileptiform activity.    Introduction  This routine 90-minute EEG was recorded using the [employer] one band system. Automated seizure detection algorithms were applied.    Description  During the maximal alert state, a poorly-regulated, symmetric, and reactive 6-7 Hz posterior dominant rhythm was seen. No consistent focal slowing or interhemispheric asymmetry was noted. Stage I and stage II sleep were observed. There were no interictal epileptiform discharges or electrographic seizures.    Activations  Hyperventilation was not performed. Intermittent photic stimulation was performed and demonstrated no posterior driving response.    Impression  Abnormal awake EEG. The slowing mentioned above suggests mild-moderate non specific encephalopathy.     No epileptiform discharges were identified. Please note the absence of such activity on this record cannot conclusively rule out an epileptic disorder. If such is still clinically suspected, a repeat study with sleep deprivation and/or prolonged sampling may be helpful.    Please note this EEG was meant to screen for emergent condition and is prone to artifact and with some limitations. The interpretation of this EEG result should be taken only with clinical correlation. Ideally regular EEG with full leads should be considered when possible.     Yohana Dorsey MD  Epilepsy Board Certified.  Neurology Board Certified.    Electronically Signed

## 2024-11-05 NOTE — ED NOTES
Pt arrived to ED unresponsive from group home.   Per report patient has been unresponsive since approx 5 am.   Patient currently being treated for withdrawal with a hx of seizures with his withdrawal.   Pt placed on several new medications recently per report.   Pt not responsive on arrival, eye fluttering and facial spasms noted.   Pt placed on 4L via NC d/t O2 desaturation to 88%.  Pt placed on full cardiac monitor on arrival

## 2024-11-05 NOTE — ED PROVIDER NOTES
[facility]  Emergency Department Encounter  Emergency Medicine Resident     Pt Name:Kole Vega  MRN: [id number]  Birthdate 1985  Date of evaluation: 24  PCP:  File, Not On, MD  Note Started: 9:53 AM EST      CHIEF COMPLAINT       Chief Complaint   Patient presents with    Altered Mental Status       HISTORY OF PRESENT ILLNESS  (Location/Symptom, Timing/Onset, Context/Setting, Quality, Duration, Modifying Factors, Severity.)      Kole Vega is a 39 y.o. male who presents with altered mental status.  Patient presents to the ER from [facility], was found unresponsive in his cell.  Received his morning meds at 5 AM and when they went to recheck on him at around 6 to 6:30 AM he was found unresponsive.  He is currently getting treated for benzo withdrawal in [facility] per staff.  Patient is unable to provide any history.      PAST MEDICAL / SURGICAL / SOCIAL / FAMILY HISTORY      has a past medical history of Asthma, MRSA (methicillin resistant staph aureus) culture positive, Seizures (HCC), and Viral hepatitis C.     has a past surgical history that includes Incision and drainage foot (Right, 2023) and Foot Debridement (Right, 2023).    Social History     Socioeconomic History    Marital status: Single     Spouse name: Not on file    Number of children: Not on file    Years of education: Not on file    Highest education level: Not on file   Occupational History    Not on file   Tobacco Use    Smoking status: Former     Current packs/day: 0.00     Types: Cigarettes     Quit date: 2021     Years since quittin.9    Smokeless tobacco: Never   Vaping Use    Vaping status: Every Day   Substance and Sexual Activity    Alcohol use: No    Drug use: No     Comment: clean for 5 years    Sexual activity: Not on file   Other Topics Concern    Not on file   Social History Narrative    Not on file     Social Determinants of Health     Financial Resource Strain: Low Risk  (2023)

## 2024-11-05 NOTE — ED NOTES
office released pt from their custody. Removed all items of  property, and they left from bedside.

## 2024-11-05 NOTE — ED PROVIDER NOTES
[facility]     Emergency Department     Faculty Note/ Attestation      8:42 AM EST  Pt Name: Kole Vega                                       MRN: [id number]  Birthdate 1985  Date of evaluation: 11/5/2024  Patients PCP:    File, Not On, MD    Attestation  I performed a history and physical examination of the patient/ or directly observed  and discussed management with the resident. I reviewed the resident’s note and agree with the documented findings and plan of care. Any areas of disagreement are noted on the chart. I was personally present for the key portions of any procedures. I have documented in the chart those procedures where I was not present during the key portions. I have reviewed the emergency nurses triage note. I agree with the chief complaint, past medical history, past surgical history, allergies, medications, social and family history as documented unless otherwise noted below.    For Physician Assistant/ Nurse Practitioner cases/documentation I have personally evaluated this patient and have completed at least one if not all key elements of the E/M (history, physical exam, and MDM). Additional findings are as noted.       Initial Screens:     -------------------------------------     ----------------------------------------     ------------------------------------------------------------------------------------------------------------  Vitals:    Vitals:    11/05/24 0745 11/05/24 0824   BP: (!) 132/94    Pulse: 85    Resp: (!) 34    SpO2: (!) 88%    Weight:  70 kg (154 lb 5.2 oz)       Chief Complaint    No chief complaint on file.         weight is 70 kg (154 lb 5.2 oz). His blood pressure is 132/94 (abnormal) and his pulse is 85. His respiration is 34 (abnormal) and oxygen saturation is 88% (abnormal).            DIAGNOSTIC RESULTS       RADIOLOGY:   CT HEAD WO CONTRAST    (Results Pending)   XR CHEST PORTABLE    (Results Pending)         LABS:  Labs Reviewed

## 2024-11-05 NOTE — PROCEDURES
EEG REPORT       Patient: Kole Vega Age: 39 y.o.  MRN: [id number]      Referring Provider: Valeriano Humphrey MD  Attending Physician:  Cameron Live MD      History: This is a routine scalp EEG recorded with time-locked video monitoring.  Patient is being evaluated for seizure disorder.    This EEG was performed to evaluate for focal and epileptiform abnormalities.       Kole Vega   Current Facility-Administered Medications   Medication Dose Route Frequency Provider Last Rate Last Admin    propofol infusion  5-50 mcg/kg/min IntraVENous Continuous Tatyana Marcelo MD 21 mL/hr at 11/05/24 1604 50 mcg/kg/min at 11/05/24 1604    0.9 % sodium chloride infusion   IntraVENous Continuous Tatyana Marcelo  mL/hr at 11/05/24 1444 Rate Verify at 11/05/24 1444    sodium chloride flush 0.9 % injection 5-40 mL  5-40 mL IntraVENous 2 times per day Percy Palomo MD        sodium chloride flush 0.9 % injection 5-40 mL  5-40 mL IntraVENous PRN Percy Palomo MD   20 mL at 11/05/24 1450    0.9 % sodium chloride infusion   IntraVENous PRN Percy Palomo MD        potassium chloride 20 mEq/50 mL IVPB (Central Line)  20 mEq IntraVENous PRN Percy Palomo MD        Or    potassium chloride 10 mEq/100 mL IVPB (Peripheral Line)  10 mEq IntraVENous PRN Percy Palomo MD        magnesium sulfate 2000 mg in 50 mL IVPB premix  2,000 mg IntraVENous PRN Percy Palomo MD        ondansetron (ZOFRAN-ODT) disintegrating tablet 4 mg  4 mg Oral Q8H PRN Percy Palomo MD        Or    ondansetron (ZOFRAN) injection 4 mg  4 mg IntraVENous Q6H PRN Percy Palomo MD        polyethylene glycol (GLYCOLAX) packet 17 g  17 g Oral Daily PRN Percy Palomo MD        acetaminophen (TYLENOL) tablet 650 mg  650 mg Oral Q6H PRN Percy Palomo MD   650 mg at 11/05/24 1449    Or    acetaminophen (TYLENOL) suppository 650 mg  650 mg Rectal Q6H

## 2024-11-05 NOTE — ED NOTES
Propofol Infusion increased to 60mcg/kg Per Critical care resident  Fluids increased to 200ml/hr per Critical care  20mcg propofol bolus given per Dr. Hernandez

## 2024-11-05 NOTE — H&P
HISTORY: unresponsive TECHNOLOGIST PROVIDED HISTORY: unresponsive Decision Support Exception - unselect if not a suspected or confirmed emergency medical condition->Emergency Medical Condition (MA) CT BRAIN FINDINGS: BRAIN/VENTRICLES: The cerebral hemispheres, brainstem, and cerebellum have a normal appearance . The falx is midline. The ventricles and peripheral sulci are normal.  There is no sign of a space occupying lesion, infarction, or hemorrhage. Orbits: Portion of the orbits demonstrate no acute abnormality. SINUSES: Soft tissue opacification in the left maxillary, ethmoid and sphenoid sinuses..  The remaining imaged portions of the paranasal sinuses are clear.  The mastoids and the middle ear chambers are clear. SOFT TISSUES/SKULL:  No acute abnormality of the visualized skull or soft tissues.     No acute intracranial abnormalities are noted.     XR ABDOMEN FOR NG/OG/NE TUBE PLACEMENT    Result Date: 11/5/2024  EXAMINATION: ONE SUPINE XRAY VIEW(S) OF THE ABDOMEN 11/5/2024 10:52 am COMPARISON: None. HISTORY: ORDERING SYSTEM PROVIDED HISTORY: Confirmation of course of NG/OG/NE tube and location of tip of tube TECHNOLOGIST PROVIDED HISTORY: Confirmation of course of NG/OG/NE tube and location of tip of tube Portable?->Yes Reason for Exam: supine FINDINGS: There is an orogastric tube in place.  Distal tip overlies the fundus the stomach.  The side port overlies the GE junction.  I recommend advancing the tube another 10 cm.     Recommend advancing the orogastric tube another 10 cm. The distal tip overlies the fundus the stomach. The side port overlies the GE junction.     XR CHEST PORTABLE    Result Date: 11/5/2024  EXAMINATION: ONE XRAY VIEW OF THE CHEST 11/5/2024 10:52 am COMPARISON: 11/05/2024, 8:16 a.m. HISTORY: ORDERING SYSTEM PROVIDED HISTORY: reassess TECHNOLOGIST PROVIDED HISTORY: reassess Reason for Exam: supine FINDINGS: ET tube is in good position in the midtrachea.  There is a gastric tube in place

## 2024-11-05 NOTE — FLOWSHEET NOTE
RN called inpatient pharmacy after pharmacy tech came over without potassium bags and the medication has not been tubed. Talked with Willie, stated he will send the bags over now.

## 2024-11-05 NOTE — CONSULTS
[facility] NEUROLOGY & NEUROSCIENCE  IN-PATIENT SERVICE    NEUROLOGY CONSULT  NOTE    Date:   11/5/2024  Patient name:  Kole Vega  Date of admission:  11/5/2024  YOB: 1985    Chief Complaint:     Chief Complaint   Patient presents with    Altered Mental Status       Reason for Consult:      Concern for nonconvulsive seizures    History of Present Illness:     The patient is a right handed, 39 y.o. male with past medical hx of polysubstance abuse, hepatitis C, previous benzodiazepine withdrawal seizure in 2021, chronic benzodiazepine use, presented with altered mental status.  History unclear but patient was found in his cell unresponsive. low GCS and was intubated for airway protection.  Transient concern for possible facial twitching.  Patient was hooked up to ceribel, and reading 80%.  Patient was loaded with 2 g of Keppra in the ER and neurology consulted.    On evaluation patient is intubated, sedated on propofol.  With sedation paused patient is opening eyes spontaneously following commands and moving all 4 extremities grossly intact.    On scheduled Valium at home 20 mg twice daily.  Lactate within normal limits    Past Medical History:     Past Medical History:   Diagnosis Date    Asthma     MRSA (methicillin resistant staph aureus) culture positive 9/1/2014    leg    Seizures (HCC)     Viral hepatitis C         Past Surgical History:     Past Surgical History:   Procedure Laterality Date    FOOT DEBRIDEMENT Right 12/11/2023    LOWER EXTREMITY I&D AND APPLICATION OF GRAFT AND WOUND VAC PLACEMENT performed by Ally Garcia DPM at [facility] OR    INCISION AND DRAINAGE FOOT Right 12/11/2023    LOWER EXTREMITY I&D AND APPLICATION OF GRAFT - Right        Medications Prior to Admission:     Prior to Admission medications    Medication Sig Start Date End Date Taking? Authorizing Provider   diazePAM (VALIUM) 10 MG tablet Take 2 tablets by mouth in the morning and 2 tablets in the

## 2024-11-05 NOTE — ED NOTES
Writer followed up with EEG that Pt has returned from CT. EEG states they will be down within the hour.

## 2024-11-06 ENCOUNTER — APPOINTMENT (OUTPATIENT)
Age: 39
DRG: 720 | End: 2024-11-06
Payer: COMMERCIAL

## 2024-11-06 ENCOUNTER — APPOINTMENT (OUTPATIENT)
Dept: CT IMAGING | Age: 39
DRG: 720 | End: 2024-11-06
Payer: COMMERCIAL

## 2024-11-06 ENCOUNTER — APPOINTMENT (OUTPATIENT)
Dept: GENERAL RADIOLOGY | Age: 39
DRG: 720 | End: 2024-11-06
Payer: COMMERCIAL

## 2024-11-06 LAB
ANION GAP SERPL CALCULATED.3IONS-SCNC: 9 MMOL/L (ref 9–16)
BACTERIA URNS QL MICRO: NORMAL
BASOPHILS # BLD: 0.04 K/UL (ref 0–0.2)
BASOPHILS NFR BLD: 0 % (ref 0–2)
BILIRUB UR QL STRIP: NEGATIVE
BUN SERPL-MCNC: 17 MG/DL (ref 6–20)
CALCIUM SERPL-MCNC: 8.8 MG/DL (ref 8.6–10.4)
CASTS #/AREA URNS LPF: NORMAL /LPF (ref 0–8)
CHLORIDE SERPL-SCNC: 104 MMOL/L (ref 98–107)
CLARITY UR: CLEAR
CO2 SERPL-SCNC: 25 MMOL/L (ref 20–31)
COLOR UR: ABNORMAL
CREAT SERPL-MCNC: 0.7 MG/DL (ref 0.7–1.2)
ECHO AO ASC DIAM: 3.8 CM
ECHO AO ASCENDING AORTA INDEX: 2.01 CM/M2
ECHO AO ROOT DIAM: 4.3 CM
ECHO AO ROOT INDEX: 2.28 CM/M2
ECHO AV AREA PEAK VELOCITY: 3.2 CM2
ECHO AV AREA VTI: 2.7 CM2
ECHO AV AREA/BSA PEAK VELOCITY: 1.7 CM2/M2
ECHO AV AREA/BSA VTI: 1.4 CM2/M2
ECHO AV MEAN GRADIENT: 3 MMHG
ECHO AV MEAN VELOCITY: 0.7 M/S
ECHO AV PEAK GRADIENT: 5 MMHG
ECHO AV PEAK VELOCITY: 1.2 M/S
ECHO AV VELOCITY RATIO: 0.75
ECHO AV VTI: 25.5 CM
ECHO BSA: 1.86 M2
ECHO LA AREA 2C: 13.8 CM2
ECHO LA AREA 4C: 14.2 CM2
ECHO LA DIAMETER INDEX: 1.22 CM/M2
ECHO LA DIAMETER: 2.3 CM
ECHO LA MAJOR AXIS: 4.9 CM
ECHO LA MINOR AXIS: 4.4 CM
ECHO LA TO AORTIC ROOT RATIO: 0.53
ECHO LA VOL BP: 35 ML (ref 18–58)
ECHO LA VOL MOD A2C: 35 ML (ref 18–58)
ECHO LA VOL MOD A4C: 32 ML (ref 18–58)
ECHO LA VOL/BSA BIPLANE: 19 ML/M2 (ref 16–34)
ECHO LA VOLUME INDEX MOD A2C: 19 ML/M2 (ref 16–34)
ECHO LA VOLUME INDEX MOD A4C: 17 ML/M2 (ref 16–34)
ECHO LV E' LATERAL VELOCITY: 14.1 CM/S
ECHO LV E' SEPTAL VELOCITY: 10.2 CM/S
ECHO LV EDV A2C: 78 ML
ECHO LV EDV A4C: 65 ML
ECHO LV EDV INDEX A4C: 34 ML/M2
ECHO LV EDV NDEX A2C: 41 ML/M2
ECHO LV EJECTION FRACTION A2C: 55 %
ECHO LV EJECTION FRACTION A4C: 58 %
ECHO LV EJECTION FRACTION BIPLANE: 57 % (ref 55–100)
ECHO LV ESV A2C: 35 ML
ECHO LV ESV A4C: 28 ML
ECHO LV ESV INDEX A2C: 19 ML/M2
ECHO LV ESV INDEX A4C: 15 ML/M2
ECHO LV FRACTIONAL SHORTENING: 31 % (ref 28–44)
ECHO LV INTERNAL DIMENSION DIASTOLE INDEX: 2.38 CM/M2
ECHO LV INTERNAL DIMENSION DIASTOLIC: 4.5 CM (ref 4.2–5.9)
ECHO LV INTERNAL DIMENSION SYSTOLIC INDEX: 1.64 CM/M2
ECHO LV INTERNAL DIMENSION SYSTOLIC: 3.1 CM
ECHO LV IVSD: 0.7 CM (ref 0.6–1)
ECHO LV MASS 2D: 95.7 G (ref 88–224)
ECHO LV MASS INDEX 2D: 50.6 G/M2 (ref 49–115)
ECHO LV POSTERIOR WALL DIASTOLIC: 0.7 CM (ref 0.6–1)
ECHO LV RELATIVE WALL THICKNESS RATIO: 0.31
ECHO LVOT AREA: 4.2 CM2
ECHO LVOT AV VTI INDEX: 0.66
ECHO LVOT DIAM: 2.3 CM
ECHO LVOT MEAN GRADIENT: 1 MMHG
ECHO LVOT PEAK GRADIENT: 3 MMHG
ECHO LVOT PEAK VELOCITY: 0.9 M/S
ECHO LVOT STROKE VOLUME INDEX: 36.9 ML/M2
ECHO LVOT SV: 69.8 ML
ECHO LVOT VTI: 16.8 CM
ECHO MV A VELOCITY: 0.59 M/S
ECHO MV AREA VTI: 2.9 CM2
ECHO MV E DECELERATION TIME (DT): 121 MS
ECHO MV E VELOCITY: 0.68 M/S
ECHO MV E/A RATIO: 1.15
ECHO MV E/E' LATERAL: 4.82
ECHO MV E/E' RATIO (AVERAGED): 5.74
ECHO MV E/E' SEPTAL: 6.67
ECHO MV LVOT VTI INDEX: 1.45
ECHO MV MAX VELOCITY: 0.8 M/S
ECHO MV MEAN GRADIENT: 1 MMHG
ECHO MV MEAN VELOCITY: 0.4 M/S
ECHO MV PEAK GRADIENT: 3 MMHG
ECHO MV VTI: 24.3 CM
ECHO PV MAX VELOCITY: 0.8 M/S
ECHO PV PEAK GRADIENT: 3 MMHG
ECHO RV BASAL DIMENSION: 3.3 CM
ECHO RV FREE WALL PEAK S': 13.9 CM/S
ECHO TV REGURGITANT MAX VELOCITY: 1.51 M/S
ECHO TV REGURGITANT PEAK GRADIENT: 9 MMHG
EKG ATRIAL RATE: 115 BPM
EKG P AXIS: 76 DEGREES
EKG P-R INTERVAL: 124 MS
EKG Q-T INTERVAL: 328 MS
EKG QRS DURATION: 94 MS
EKG QTC CALCULATION (BAZETT): 453 MS
EKG R AXIS: 85 DEGREES
EKG T AXIS: -45 DEGREES
EKG VENTRICULAR RATE: 115 BPM
EOSINOPHIL # BLD: 0.18 K/UL (ref 0–0.44)
EOSINOPHILS RELATIVE PERCENT: 1 % (ref 1–4)
EPI CELLS #/AREA URNS HPF: NORMAL /HPF (ref 0–5)
ERYTHROCYTE [DISTWIDTH] IN BLOOD BY AUTOMATED COUNT: 12.4 % (ref 11.8–14.4)
FIO2: 60
GFR, ESTIMATED: >90 ML/MIN/1.73M2
GLUCOSE BLD-MCNC: 110 MG/DL (ref 74–100)
GLUCOSE SERPL-MCNC: 97 MG/DL (ref 74–99)
GLUCOSE UR STRIP-MCNC: NEGATIVE MG/DL
HCT VFR BLD AUTO: 35.8 % (ref 40.7–50.3)
HGB BLD-MCNC: 11.4 G/DL (ref 13–17)
HGB UR QL STRIP.AUTO: ABNORMAL
IMM GRANULOCYTES # BLD AUTO: 0.13 K/UL (ref 0–0.3)
IMM GRANULOCYTES NFR BLD: 1 %
KETONES UR STRIP-MCNC: ABNORMAL MG/DL
LEUKOCYTE ESTERASE UR QL STRIP: NEGATIVE
LYMPHOCYTES NFR BLD: 2.24 K/UL (ref 1.1–3.7)
LYMPHOCYTES RELATIVE PERCENT: 11 % (ref 24–43)
MCH RBC QN AUTO: 28.9 PG (ref 25.2–33.5)
MCHC RBC AUTO-ENTMCNC: 31.8 G/DL (ref 28.4–34.8)
MCV RBC AUTO: 90.6 FL (ref 82.6–102.9)
MONOCYTES NFR BLD: 1.12 K/UL (ref 0.1–1.2)
MONOCYTES NFR BLD: 5 % (ref 3–12)
NEUTROPHILS NFR BLD: 82 % (ref 36–65)
NEUTS SEG NFR BLD: 17.12 K/UL (ref 1.5–8.1)
NITRITE UR QL STRIP: NEGATIVE
NRBC BLD-RTO: 0 PER 100 WBC
PATIENT TEMP: 36.9
PH UR STRIP: 6.5 [PH] (ref 5–8)
PLATELET # BLD AUTO: 279 K/UL (ref 138–453)
PMV BLD AUTO: 10.1 FL (ref 8.1–13.5)
POC HCO3: 26.9 MMOL/L (ref 21–28)
POC O2 SATURATION: 99.8 % (ref 94–98)
POC PCO2: 36.1 MM HG (ref 35–48)
POC PH: 7.48 (ref 7.35–7.45)
POC PO2: 222.4 MM HG (ref 83–108)
POSITIVE BASE EXCESS, ART: 3.4 MMOL/L (ref 0–3)
POTASSIUM SERPL-SCNC: 3.7 MMOL/L (ref 3.7–5.3)
PROT UR STRIP-MCNC: ABNORMAL MG/DL
RBC # BLD AUTO: 3.95 M/UL (ref 4.21–5.77)
RBC #/AREA URNS HPF: NORMAL /HPF (ref 0–4)
SAMPLE SITE: ABNORMAL
SODIUM SERPL-SCNC: 138 MMOL/L (ref 136–145)
SP GR UR STRIP: 1.04 (ref 1–1.03)
UROBILINOGEN UR STRIP-ACNC: NORMAL EU/DL (ref 0–1)
WBC #/AREA URNS HPF: NORMAL /HPF (ref 0–5)
WBC OTHER # BLD: 20.8 K/UL (ref 3.5–11.3)

## 2024-11-06 PROCEDURE — 93306 TTE W/DOPPLER COMPLETE: CPT

## 2024-11-06 PROCEDURE — 80048 BASIC METABOLIC PNL TOTAL CA: CPT

## 2024-11-06 PROCEDURE — 6360000002 HC RX W HCPCS

## 2024-11-06 PROCEDURE — 2700000000 HC OXYGEN THERAPY PER DAY

## 2024-11-06 PROCEDURE — 6370000000 HC RX 637 (ALT 250 FOR IP)

## 2024-11-06 PROCEDURE — 6370000000 HC RX 637 (ALT 250 FOR IP): Performed by: INTERNAL MEDICINE

## 2024-11-06 PROCEDURE — 99232 SBSQ HOSP IP/OBS MODERATE 35: CPT | Performed by: STUDENT IN AN ORGANIZED HEALTH CARE EDUCATION/TRAINING PROGRAM

## 2024-11-06 PROCEDURE — 93306 TTE W/DOPPLER COMPLETE: CPT | Performed by: INTERNAL MEDICINE

## 2024-11-06 PROCEDURE — 3E0G76Z INTRODUCTION OF NUTRITIONAL SUBSTANCE INTO UPPER GI, VIA NATURAL OR ARTIFICIAL OPENING: ICD-10-PCS | Performed by: INTERNAL MEDICINE

## 2024-11-06 PROCEDURE — 94003 VENT MGMT INPAT SUBQ DAY: CPT

## 2024-11-06 PROCEDURE — 36600 WITHDRAWAL OF ARTERIAL BLOOD: CPT

## 2024-11-06 PROCEDURE — 6360000004 HC RX CONTRAST MEDICATION

## 2024-11-06 PROCEDURE — 2580000003 HC RX 258

## 2024-11-06 PROCEDURE — 85025 COMPLETE CBC W/AUTO DIFF WBC: CPT

## 2024-11-06 PROCEDURE — 82947 ASSAY GLUCOSE BLOOD QUANT: CPT

## 2024-11-06 PROCEDURE — 36415 COLL VENOUS BLD VENIPUNCTURE: CPT

## 2024-11-06 PROCEDURE — 81001 URINALYSIS AUTO W/SCOPE: CPT

## 2024-11-06 PROCEDURE — 71045 X-RAY EXAM CHEST 1 VIEW: CPT

## 2024-11-06 PROCEDURE — 70450 CT HEAD/BRAIN W/O DYE: CPT

## 2024-11-06 PROCEDURE — 99291 CRITICAL CARE FIRST HOUR: CPT | Performed by: INTERNAL MEDICINE

## 2024-11-06 PROCEDURE — 94640 AIRWAY INHALATION TREATMENT: CPT

## 2024-11-06 PROCEDURE — 70490 CT SOFT TISSUE NECK W/O DYE: CPT

## 2024-11-06 PROCEDURE — 95714 VEEG EA 12-26 HR UNMNTR: CPT

## 2024-11-06 PROCEDURE — 82803 BLOOD GASES ANY COMBINATION: CPT

## 2024-11-06 PROCEDURE — 70498 CT ANGIOGRAPHY NECK: CPT

## 2024-11-06 PROCEDURE — 71250 CT THORAX DX C-: CPT

## 2024-11-06 PROCEDURE — 2500000003 HC RX 250 WO HCPCS: Performed by: INTERNAL MEDICINE

## 2024-11-06 PROCEDURE — 2000000000 HC ICU R&B

## 2024-11-06 PROCEDURE — 95718 EEG PHYS/QHP 2-12 HR W/VEEG: CPT | Performed by: PSYCHIATRY & NEUROLOGY

## 2024-11-06 PROCEDURE — 94761 N-INVAS EAR/PLS OXIMETRY MLT: CPT

## 2024-11-06 RX ORDER — IOPAMIDOL 755 MG/ML
90 INJECTION, SOLUTION INTRAVASCULAR
Status: COMPLETED | OUTPATIENT
Start: 2024-11-06 | End: 2024-11-06

## 2024-11-06 RX ORDER — LEVETIRACETAM 500 MG/5ML
INJECTION, SOLUTION, CONCENTRATE INTRAVENOUS
Status: COMPLETED
Start: 2024-11-06 | End: 2024-11-06

## 2024-11-06 RX ORDER — LORAZEPAM 2 MG/ML
2 INJECTION INTRAMUSCULAR ONCE
Status: DISCONTINUED | OUTPATIENT
Start: 2024-11-06 | End: 2024-11-06

## 2024-11-06 RX ORDER — DEXMEDETOMIDINE HYDROCHLORIDE 4 UG/ML
.1-1.5 INJECTION, SOLUTION INTRAVENOUS CONTINUOUS
Status: DISCONTINUED | OUTPATIENT
Start: 2024-11-07 | End: 2024-11-08

## 2024-11-06 RX ORDER — LORAZEPAM 2 MG/ML
4 INJECTION INTRAMUSCULAR ONCE
Status: COMPLETED | OUTPATIENT
Start: 2024-11-06 | End: 2024-11-06

## 2024-11-06 RX ORDER — LANSOPRAZOLE 30 MG/1
30 TABLET, ORALLY DISINTEGRATING, DELAYED RELEASE ORAL
Status: DISCONTINUED | OUTPATIENT
Start: 2024-11-07 | End: 2024-11-12 | Stop reason: HOSPADM

## 2024-11-06 RX ORDER — LEVETIRACETAM 500 MG/5ML
INJECTION, SOLUTION, CONCENTRATE INTRAVENOUS
Status: DISPENSED
Start: 2024-11-06 | End: 2024-11-07

## 2024-11-06 RX ORDER — LEVETIRACETAM 500 MG/5ML
1500 INJECTION, SOLUTION, CONCENTRATE INTRAVENOUS ONCE
Status: COMPLETED | OUTPATIENT
Start: 2024-11-06 | End: 2024-11-06

## 2024-11-06 RX ORDER — LORAZEPAM 2 MG/ML
INJECTION INTRAMUSCULAR
Status: COMPLETED
Start: 2024-11-06 | End: 2024-11-06

## 2024-11-06 RX ORDER — PROPOFOL 10 MG/ML
100 INJECTION, EMULSION INTRAVENOUS ONCE
Status: DISCONTINUED | OUTPATIENT
Start: 2024-11-06 | End: 2024-11-08

## 2024-11-06 RX ORDER — LEVETIRACETAM 15 MG/ML
1500 INJECTION INTRAVASCULAR ONCE
Status: DISCONTINUED | OUTPATIENT
Start: 2024-11-06 | End: 2024-11-06

## 2024-11-06 RX ORDER — PROPOFOL 10 MG/ML
5-50 INJECTION, EMULSION INTRAVENOUS CONTINUOUS
Status: DISCONTINUED | OUTPATIENT
Start: 2024-11-06 | End: 2024-11-07

## 2024-11-06 RX ORDER — SODIUM CHLORIDE, SODIUM LACTATE, POTASSIUM CHLORIDE, AND CALCIUM CHLORIDE .6; .31; .03; .02 G/100ML; G/100ML; G/100ML; G/100ML
1000 INJECTION, SOLUTION INTRAVENOUS ONCE
Status: COMPLETED | OUTPATIENT
Start: 2024-11-06 | End: 2024-11-06

## 2024-11-06 RX ORDER — MIDAZOLAM HYDROCHLORIDE 2 MG/2ML
4 INJECTION, SOLUTION INTRAMUSCULAR; INTRAVENOUS ONCE
Status: COMPLETED | OUTPATIENT
Start: 2024-11-06 | End: 2024-11-06

## 2024-11-06 RX ORDER — MIDAZOLAM HYDROCHLORIDE 1 MG/ML
INJECTION, SOLUTION INTRAMUSCULAR; INTRAVENOUS
Status: COMPLETED
Start: 2024-11-06 | End: 2024-11-06

## 2024-11-06 RX ADMIN — PROPOFOL 50 MCG/KG/MIN: 10 INJECTION, EMULSION INTRAVENOUS at 02:03

## 2024-11-06 RX ADMIN — PANTOPRAZOLE SODIUM 40 MG: 40 INJECTION, POWDER, FOR SOLUTION INTRAVENOUS at 08:57

## 2024-11-06 RX ADMIN — TRAZODONE HYDROCHLORIDE 50 MG: 50 TABLET ORAL at 20:58

## 2024-11-06 RX ADMIN — Medication 200 MCG/HR: at 04:28

## 2024-11-06 RX ADMIN — Medication 175 MCG/HR: at 14:43

## 2024-11-06 RX ADMIN — IPRATROPIUM BROMIDE AND ALBUTEROL SULFATE 1 DOSE: .5; 2.5 SOLUTION RESPIRATORY (INHALATION) at 19:53

## 2024-11-06 RX ADMIN — DIAZEPAM 20 MG: 5 TABLET ORAL at 16:33

## 2024-11-06 RX ADMIN — SODIUM CHLORIDE, POTASSIUM CHLORIDE, SODIUM LACTATE AND CALCIUM CHLORIDE 1000 ML: 600; 310; 30; 20 INJECTION, SOLUTION INTRAVENOUS at 15:09

## 2024-11-06 RX ADMIN — SODIUM CHLORIDE, PRESERVATIVE FREE 10 ML: 5 INJECTION INTRAVENOUS at 20:51

## 2024-11-06 RX ADMIN — MIDAZOLAM HYDROCHLORIDE 4 MG: 1 INJECTION, SOLUTION INTRAMUSCULAR; INTRAVENOUS at 21:27

## 2024-11-06 RX ADMIN — SODIUM CHLORIDE: 9 INJECTION, SOLUTION INTRAVENOUS at 14:36

## 2024-11-06 RX ADMIN — IPRATROPIUM BROMIDE AND ALBUTEROL SULFATE 1 DOSE: .5; 2.5 SOLUTION RESPIRATORY (INHALATION) at 08:20

## 2024-11-06 RX ADMIN — SODIUM CHLORIDE, POTASSIUM CHLORIDE, SODIUM LACTATE AND CALCIUM CHLORIDE 1000 ML: 600; 310; 30; 20 INJECTION, SOLUTION INTRAVENOUS at 02:23

## 2024-11-06 RX ADMIN — SODIUM CHLORIDE, PRESERVATIVE FREE 10 ML: 5 INJECTION INTRAVENOUS at 08:57

## 2024-11-06 RX ADMIN — PROPOFOL 50 MCG/KG/MIN: 10 INJECTION, EMULSION INTRAVENOUS at 17:45

## 2024-11-06 RX ADMIN — LORAZEPAM 4 MG: 2 INJECTION INTRAMUSCULAR at 21:24

## 2024-11-06 RX ADMIN — LORAZEPAM 4 MG: 2 INJECTION INTRAMUSCULAR; INTRAVENOUS at 21:24

## 2024-11-06 RX ADMIN — GABAPENTIN 300 MG: 300 CAPSULE ORAL at 20:58

## 2024-11-06 RX ADMIN — DIAZEPAM 20 MG: 5 TABLET ORAL at 08:57

## 2024-11-06 RX ADMIN — GABAPENTIN 300 MG: 300 CAPSULE ORAL at 08:57

## 2024-11-06 RX ADMIN — MIDAZOLAM HYDROCHLORIDE 4 MG: 2 INJECTION, SOLUTION INTRAMUSCULAR; INTRAVENOUS at 21:27

## 2024-11-06 RX ADMIN — SODIUM CHLORIDE 3000 MG: 900 INJECTION INTRAVENOUS at 01:29

## 2024-11-06 RX ADMIN — LEVETIRACETAM 1500 MG: 500 INJECTION, SOLUTION, CONCENTRATE INTRAVENOUS at 21:26

## 2024-11-06 RX ADMIN — LEVETIRACETAM 1500 MG: 100 INJECTION INTRAVENOUS at 21:26

## 2024-11-06 RX ADMIN — SODIUM CHLORIDE 3000 MG: 900 INJECTION INTRAVENOUS at 09:00

## 2024-11-06 RX ADMIN — SODIUM CHLORIDE 3000 MG: 900 INJECTION INTRAVENOUS at 17:32

## 2024-11-06 RX ADMIN — LEVETIRACETAM 1500 MG: 100 INJECTION INTRAVENOUS at 22:01

## 2024-11-06 RX ADMIN — PROPOFOL 45 MCG/KG/MIN: 10 INJECTION, EMULSION INTRAVENOUS at 11:41

## 2024-11-06 RX ADMIN — POTASSIUM CHLORIDE 10 MEQ: 7.45 INJECTION INTRAVENOUS at 00:22

## 2024-11-06 RX ADMIN — IPRATROPIUM BROMIDE AND ALBUTEROL SULFATE 1 DOSE: .5; 2.5 SOLUTION RESPIRATORY (INHALATION) at 15:49

## 2024-11-06 RX ADMIN — GABAPENTIN 300 MG: 300 CAPSULE ORAL at 15:14

## 2024-11-06 RX ADMIN — SODIUM CHLORIDE 3000 MG: 900 INJECTION INTRAVENOUS at 23:46

## 2024-11-06 RX ADMIN — SODIUM CHLORIDE: 9 INJECTION, SOLUTION INTRAVENOUS at 02:04

## 2024-11-06 RX ADMIN — IPRATROPIUM BROMIDE AND ALBUTEROL SULFATE 1 DOSE: .5; 2.5 SOLUTION RESPIRATORY (INHALATION) at 12:08

## 2024-11-06 RX ADMIN — PROPOFOL 50 MCG/KG/MIN: 10 INJECTION, EMULSION INTRAVENOUS at 21:05

## 2024-11-06 RX ADMIN — IOPAMIDOL 90 ML: 755 INJECTION, SOLUTION INTRAVENOUS at 23:00

## 2024-11-06 RX ADMIN — PROPOFOL 45 MCG/KG/MIN: 10 INJECTION, EMULSION INTRAVENOUS at 05:58

## 2024-11-06 RX ADMIN — SERTRALINE HYDROCHLORIDE 50 MG: 50 TABLET ORAL at 08:57

## 2024-11-06 ASSESSMENT — PULMONARY FUNCTION TESTS
PIF_VALUE: 24
PIF_VALUE: 23
PIF_VALUE: 21
PIF_VALUE: 21
PIF_VALUE: 18
PIF_VALUE: 24
PIF_VALUE: 27
PIF_VALUE: 20
PIF_VALUE: 56
PIF_VALUE: 22
PIF_VALUE: 24
PIF_VALUE: 23

## 2024-11-06 NOTE — PROCEDURES
PROCEDURE NOTE  Date: 11/6/2024   Name: Kole Vega  YOB: 1985    Procedures            Date: 11/5/2024  Referring physician: Dr. Live    Indication  Patient aged 39 y with encephalopathy. EEG done to assess for epileptiform activity.    Introduction  This routine 6 hrs vEEG was recorded using the [employer] one band system. Automated seizure detection algorithms were applied.    Description  During the maximal alert state, a poorly-regulated, symmetric, and reactive 7-8 Hz posterior dominant rhythm was seen admixed with faster beta activity likely due to medication effect. No consistent focal slowing or interhemispheric asymmetry was noted. Stage I and stage II sleep were observed. There were no interictal epileptiform discharges or electrographic seizures.    Activations  Hyperventilation was not performed. Intermittent photic stimulation was performed and demonstrated no posterior driving response.    Impression  Abnormal awake EEG. The slowing mentioned above suggests mild non specific encephalopathy.     No epileptiform discharges were identified. Please note the absence of such activity on this record cannot conclusively rule out an epileptic disorder. If such is still clinically suspected, a repeat study with sleep deprivation and/or prolonged sampling may be helpful.    Please note this EEG was meant to screen for emergent condition and is prone to artifact and with some limitations. The interpretation of this EEG result should be taken only with clinical correlation. Ideally regular EEG with full leads should be considered when possible.     Yohana Dorsey MD  Epilepsy Board Certified.  Neurology Board Certified.    Electronically Signed

## 2024-11-06 NOTE — FLOWSHEET NOTE
Patient spontaneously awake. Calm, pleasant, but tachypenic with vent. Communicates with RN via American Sign Language alphabet and written communication. States he would like his Valium as it helps keep him sedated.

## 2024-11-07 ENCOUNTER — APPOINTMENT (OUTPATIENT)
Dept: GENERAL RADIOLOGY | Age: 39
DRG: 720 | End: 2024-11-07
Payer: COMMERCIAL

## 2024-11-07 ENCOUNTER — APPOINTMENT (OUTPATIENT)
Dept: INTERVENTIONAL RADIOLOGY/VASCULAR | Age: 39
DRG: 720 | End: 2024-11-07
Payer: COMMERCIAL

## 2024-11-07 PROBLEM — A39.9: Status: ACTIVE | Noted: 2024-11-07

## 2024-11-07 PROBLEM — J18.9 COMMUNITY ACQUIRED PNEUMONIA OF LEFT LOWER LOBE OF LUNG: Status: ACTIVE | Noted: 2024-11-07

## 2024-11-07 PROBLEM — J98.2 MEDIASTINAL EMPHYSEMA (PNEUMOMEDIASTINUM) (HCC): Status: ACTIVE | Noted: 2024-11-07

## 2024-11-07 PROBLEM — D72.825 BANDEMIA: Status: ACTIVE | Noted: 2024-11-07

## 2024-11-07 PROBLEM — F19.10 MULTIPLE SUBSTANCE ABUSE (HCC): Status: ACTIVE | Noted: 2024-11-07

## 2024-11-07 LAB
ANION GAP SERPL CALCULATED.3IONS-SCNC: 9 MMOL/L (ref 9–16)
APPEARANCE CSF: CLEAR
BASOPHILS # BLD: 0.04 K/UL (ref 0–0.2)
BASOPHILS NFR BLD: 0 % (ref 0–2)
BUN SERPL-MCNC: 9 MG/DL (ref 6–20)
C GATTII+NEOFOR DNA CSF QL NAA+NON-PROBE: NOT DETECTED
CALCIUM SERPL-MCNC: 8.7 MG/DL (ref 8.6–10.4)
CHLORIDE SERPL-SCNC: 107 MMOL/L (ref 98–107)
CLOT CHECK: ABNORMAL
CMV DNA CSF QL NAA+NON-PROBE: NOT DETECTED
CO2 SERPL-SCNC: 25 MMOL/L (ref 20–31)
COLOR CSF: COLORLESS
CREAT SERPL-MCNC: 0.6 MG/DL (ref 0.7–1.2)
E COLI K1 DNA CSF QL NAA+NON-PROBE: NOT DETECTED
EKG ATRIAL RATE: 95 BPM
EKG P AXIS: 90 DEGREES
EKG P-R INTERVAL: 136 MS
EKG Q-T INTERVAL: 344 MS
EKG QRS DURATION: 96 MS
EKG QTC CALCULATION (BAZETT): 432 MS
EKG R AXIS: 98 DEGREES
EKG T AXIS: 50 DEGREES
EKG VENTRICULAR RATE: 95 BPM
EOSINOPHIL # BLD: 0.45 K/UL (ref 0–0.44)
EOSINOPHILS RELATIVE PERCENT: 4 % (ref 1–4)
ERYTHROCYTE [DISTWIDTH] IN BLOOD BY AUTOMATED COUNT: 11.9 % (ref 11.8–14.4)
EV RNA CSF QL NAA+NON-PROBE: NOT DETECTED
GFR, ESTIMATED: >90 ML/MIN/1.73M2
GLUCOSE BLD-MCNC: 98 MG/DL (ref 74–100)
GLUCOSE CSF-MCNC: 62 MG/DL (ref 40–70)
GLUCOSE SERPL-MCNC: 101 MG/DL (ref 74–99)
GP B STREP DNA CSF QL NAA+NON-PROBE: NOT DETECTED
HAEM INFLU DNA CSF QL NAA+NON-PROBE: NOT DETECTED
HCT VFR BLD AUTO: 32.2 % (ref 40.7–50.3)
HGB BLD-MCNC: 10.4 G/DL (ref 13–17)
HHV6 DNA CSF QL NAA+NON-PROBE: NOT DETECTED
HSV1 DNA CSF QL NAA+NON-PROBE: NOT DETECTED
HSV2 DNA CSF QL NAA+NON-PROBE: NOT DETECTED
IMM GRANULOCYTES # BLD AUTO: 0.14 K/UL (ref 0–0.3)
IMM GRANULOCYTES NFR BLD: 1 %
L MONOCYTOG DNA CSF QL NAA+NON-PROBE: NOT DETECTED
LYMPHOCYTES NFR BLD: 2.05 K/UL (ref 1.1–3.7)
LYMPHOCYTES RELATIVE PERCENT: 16 % (ref 24–43)
MAGNESIUM SERPL-MCNC: 2.1 MG/DL (ref 1.6–2.6)
MCH RBC QN AUTO: 28.7 PG (ref 25.2–33.5)
MCHC RBC AUTO-ENTMCNC: 32.3 G/DL (ref 28.4–34.8)
MCV RBC AUTO: 88.7 FL (ref 82.6–102.9)
MICROORGANISM SPEC CULT: ABNORMAL
MICROORGANISM SPEC CULT: ABNORMAL
MICROORGANISM/AGENT SPEC: ABNORMAL
MONOCYTES NFR BLD: 1.08 K/UL (ref 0.1–1.2)
MONOCYTES NFR BLD: 9 % (ref 3–12)
N MEN DNA CSF QL NAA+NON-PROBE: NOT DETECTED
NEUTROPHILS NFR BLD: 71 % (ref 36–65)
NEUTS SEG NFR BLD: 8.99 K/UL (ref 1.5–8.1)
NRBC BLD-RTO: 0 PER 100 WBC
PARECHOVIRUS A RNA CSF QL NAA+NON-PROBE: NOT DETECTED
PLATELET # BLD AUTO: 244 K/UL (ref 138–453)
PMV BLD AUTO: 9.9 FL (ref 8.1–13.5)
POC HCO3: 25.8 MMOL/L (ref 21–28)
POC O2 SATURATION: 96.5 % (ref 94–98)
POC PCO2: 37.1 MM HG (ref 35–48)
POC PH: 7.45 (ref 7.35–7.45)
POC PO2: 81.3 MM HG (ref 83–108)
POSITIVE BASE EXCESS, ART: 1.7 MMOL/L (ref 0–3)
POTASSIUM SERPL-SCNC: 3.3 MMOL/L (ref 3.7–5.3)
PROT CSF-MCNC: 56.5 MG/DL (ref 15–45)
RBC # BLD AUTO: 3.63 M/UL (ref 4.21–5.77)
RBC # FLD MANUAL: 3 CELLS/UL
S PNEUM DNA CSF QL NAA+NON-PROBE: NOT DETECTED
SAMPLE SITE: ABNORMAL
SERVICE CMNT-IMP: ABNORMAL
SODIUM SERPL-SCNC: 141 MMOL/L (ref 136–145)
SPECIMEN DESCRIPTION: ABNORMAL
SPECIMEN DESCRIPTION: NORMAL
SPECIMEN VOL CSF: 8 ML
TOTAL CELLS COUNTED BRONCH: 1 CELLS/UL (ref 0–5)
TRIGL SERPL-MCNC: 245 MG/DL
TUBE # CSF: 3
VZV DNA CSF QL NAA+NON-PROBE: NOT DETECTED
WBC OTHER # BLD: 12.8 K/UL (ref 3.5–11.3)
XANTHOCHROMIA CSF QL: ABNORMAL

## 2024-11-07 PROCEDURE — 88112 CYTOPATH CELL ENHANCE TECH: CPT

## 2024-11-07 PROCEDURE — 2580000003 HC RX 258

## 2024-11-07 PROCEDURE — 83735 ASSAY OF MAGNESIUM: CPT

## 2024-11-07 PROCEDURE — 36415 COLL VENOUS BLD VENIPUNCTURE: CPT

## 2024-11-07 PROCEDURE — 94761 N-INVAS EAR/PLS OXIMETRY MLT: CPT

## 2024-11-07 PROCEDURE — 94003 VENT MGMT INPAT SUBQ DAY: CPT

## 2024-11-07 PROCEDURE — 85025 COMPLETE CBC W/AUTO DIFF WBC: CPT

## 2024-11-07 PROCEDURE — 82803 BLOOD GASES ANY COMBINATION: CPT

## 2024-11-07 PROCEDURE — 2580000003 HC RX 258: Performed by: INTERNAL MEDICINE

## 2024-11-07 PROCEDURE — 51702 INSERT TEMP BLADDER CATH: CPT

## 2024-11-07 PROCEDURE — 95708 EEG WO VID EA 12-26HR UNMNTR: CPT

## 2024-11-07 PROCEDURE — 99255 IP/OBS CONSLTJ NEW/EST HI 80: CPT | Performed by: INTERNAL MEDICINE

## 2024-11-07 PROCEDURE — 6360000002 HC RX W HCPCS: Performed by: INTERNAL MEDICINE

## 2024-11-07 PROCEDURE — 93010 ELECTROCARDIOGRAM REPORT: CPT | Performed by: INTERNAL MEDICINE

## 2024-11-07 PROCEDURE — 6370000000 HC RX 637 (ALT 250 FOR IP)

## 2024-11-07 PROCEDURE — 6370000000 HC RX 637 (ALT 250 FOR IP): Performed by: INTERNAL MEDICINE

## 2024-11-07 PROCEDURE — 009U3ZX DRAINAGE OF SPINAL CANAL, PERCUTANEOUS APPROACH, DIAGNOSTIC: ICD-10-PCS

## 2024-11-07 PROCEDURE — 6360000002 HC RX W HCPCS

## 2024-11-07 PROCEDURE — 87205 SMEAR GRAM STAIN: CPT

## 2024-11-07 PROCEDURE — 2700000000 HC OXYGEN THERAPY PER DAY

## 2024-11-07 PROCEDURE — 99232 SBSQ HOSP IP/OBS MODERATE 35: CPT | Performed by: PSYCHIATRY & NEUROLOGY

## 2024-11-07 PROCEDURE — 2000000000 HC ICU R&B

## 2024-11-07 PROCEDURE — 51798 US URINE CAPACITY MEASURE: CPT

## 2024-11-07 PROCEDURE — 74018 RADEX ABDOMEN 1 VIEW: CPT

## 2024-11-07 PROCEDURE — 99291 CRITICAL CARE FIRST HOUR: CPT | Performed by: INTERNAL MEDICINE

## 2024-11-07 PROCEDURE — 71045 X-RAY EXAM CHEST 1 VIEW: CPT

## 2024-11-07 PROCEDURE — 89050 BODY FLUID CELL COUNT: CPT

## 2024-11-07 PROCEDURE — 87483 CNS DNA AMP PROBE TYPE 12-25: CPT

## 2024-11-07 PROCEDURE — 95720 EEG PHY/QHP EA INCR W/VEEG: CPT | Performed by: PSYCHIATRY & NEUROLOGY

## 2024-11-07 PROCEDURE — 82945 GLUCOSE OTHER FLUID: CPT

## 2024-11-07 PROCEDURE — 62328 DX LMBR SPI PNXR W/FLUOR/CT: CPT

## 2024-11-07 PROCEDURE — 51701 INSERT BLADDER CATHETER: CPT

## 2024-11-07 PROCEDURE — 36600 WITHDRAWAL OF ARTERIAL BLOOD: CPT

## 2024-11-07 PROCEDURE — 84478 ASSAY OF TRIGLYCERIDES: CPT

## 2024-11-07 PROCEDURE — 80048 BASIC METABOLIC PNL TOTAL CA: CPT

## 2024-11-07 PROCEDURE — 2500000003 HC RX 250 WO HCPCS

## 2024-11-07 PROCEDURE — APPSS30 APP SPLIT SHARED TIME 16-30 MINUTES: Performed by: NURSE PRACTITIONER

## 2024-11-07 PROCEDURE — 82947 ASSAY GLUCOSE BLOOD QUANT: CPT

## 2024-11-07 PROCEDURE — 84157 ASSAY OF PROTEIN OTHER: CPT

## 2024-11-07 PROCEDURE — 94640 AIRWAY INHALATION TREATMENT: CPT

## 2024-11-07 PROCEDURE — 87070 CULTURE OTHR SPECIMN AEROBIC: CPT

## 2024-11-07 RX ORDER — CLONIDINE HYDROCHLORIDE 0.1 MG/1
0.1 TABLET ORAL EVERY MORNING
Status: CANCELLED | OUTPATIENT
Start: 2024-11-07

## 2024-11-07 RX ORDER — CLONIDINE HYDROCHLORIDE 0.1 MG/1
0.2 TABLET ORAL EVERY EVENING
Status: CANCELLED | OUTPATIENT
Start: 2024-11-07

## 2024-11-07 RX ORDER — PROPOFOL 10 MG/ML
5-50 INJECTION, EMULSION INTRAVENOUS CONTINUOUS
Status: DISCONTINUED | OUTPATIENT
Start: 2024-11-07 | End: 2024-11-07

## 2024-11-07 RX ORDER — FENTANYL CITRATE 50 UG/ML
50 INJECTION, SOLUTION INTRAMUSCULAR; INTRAVENOUS
Status: DISCONTINUED | OUTPATIENT
Start: 2024-11-07 | End: 2024-11-07

## 2024-11-07 RX ORDER — IPRATROPIUM BROMIDE AND ALBUTEROL SULFATE 2.5; .5 MG/3ML; MG/3ML
1 SOLUTION RESPIRATORY (INHALATION) EVERY 4 HOURS PRN
Status: DISCONTINUED | OUTPATIENT
Start: 2024-11-07 | End: 2024-11-12 | Stop reason: HOSPADM

## 2024-11-07 RX ORDER — FENTANYL CITRATE 50 UG/ML
100 INJECTION, SOLUTION INTRAMUSCULAR; INTRAVENOUS
Status: DISCONTINUED | OUTPATIENT
Start: 2024-11-07 | End: 2024-11-12 | Stop reason: HOSPADM

## 2024-11-07 RX ORDER — FENTANYL CITRATE 50 UG/ML
50 INJECTION, SOLUTION INTRAMUSCULAR; INTRAVENOUS
Status: DISCONTINUED | OUTPATIENT
Start: 2024-11-07 | End: 2024-11-12 | Stop reason: HOSPADM

## 2024-11-07 RX ADMIN — SODIUM CHLORIDE 3000 MG: 900 INJECTION INTRAVENOUS at 10:04

## 2024-11-07 RX ADMIN — FENTANYL CITRATE 100 MCG: 50 INJECTION, SOLUTION INTRAMUSCULAR; INTRAVENOUS at 16:57

## 2024-11-07 RX ADMIN — CEFTRIAXONE SODIUM 2000 MG: 2 INJECTION, POWDER, FOR SOLUTION INTRAMUSCULAR; INTRAVENOUS at 20:37

## 2024-11-07 RX ADMIN — PROPOFOL 50 MCG/KG/MIN: 10 INJECTION, EMULSION INTRAVENOUS at 10:55

## 2024-11-07 RX ADMIN — SERTRALINE HYDROCHLORIDE 50 MG: 50 TABLET ORAL at 08:15

## 2024-11-07 RX ADMIN — IPRATROPIUM BROMIDE AND ALBUTEROL SULFATE 1 DOSE: .5; 2.5 SOLUTION RESPIRATORY (INHALATION) at 11:23

## 2024-11-07 RX ADMIN — PROPOFOL 50 MCG/KG/MIN: 10 INJECTION, EMULSION INTRAVENOUS at 00:19

## 2024-11-07 RX ADMIN — DEXMEDETOMIDINE HYDROCHLORIDE 0.2 MCG/KG/HR: 400 INJECTION, SOLUTION INTRAVENOUS at 08:04

## 2024-11-07 RX ADMIN — Medication 150 MCG/HR: at 12:31

## 2024-11-07 RX ADMIN — GABAPENTIN 300 MG: 300 CAPSULE ORAL at 20:37

## 2024-11-07 RX ADMIN — SODIUM CHLORIDE, PRESERVATIVE FREE 10 ML: 5 INJECTION INTRAVENOUS at 20:37

## 2024-11-07 RX ADMIN — DIAZEPAM 20 MG: 5 TABLET ORAL at 08:16

## 2024-11-07 RX ADMIN — IPRATROPIUM BROMIDE AND ALBUTEROL SULFATE 1 DOSE: .5; 2.5 SOLUTION RESPIRATORY (INHALATION) at 16:01

## 2024-11-07 RX ADMIN — SODIUM CHLORIDE, PRESERVATIVE FREE 10 ML: 5 INJECTION INTRAVENOUS at 08:15

## 2024-11-07 RX ADMIN — GABAPENTIN 300 MG: 300 CAPSULE ORAL at 08:15

## 2024-11-07 RX ADMIN — POTASSIUM CHLORIDE 10 MEQ: 7.45 INJECTION INTRAVENOUS at 06:38

## 2024-11-07 RX ADMIN — Medication 200 MCG/HR: at 03:33

## 2024-11-07 RX ADMIN — POTASSIUM CHLORIDE 10 MEQ: 7.45 INJECTION INTRAVENOUS at 10:56

## 2024-11-07 RX ADMIN — POTASSIUM CHLORIDE 10 MEQ: 7.45 INJECTION INTRAVENOUS at 12:36

## 2024-11-07 RX ADMIN — FENTANYL CITRATE 100 MCG: 50 INJECTION, SOLUTION INTRAMUSCULAR; INTRAVENOUS at 23:48

## 2024-11-07 RX ADMIN — SODIUM CHLORIDE: 9 INJECTION, SOLUTION INTRAVENOUS at 05:02

## 2024-11-07 RX ADMIN — GABAPENTIN 300 MG: 300 CAPSULE ORAL at 15:37

## 2024-11-07 RX ADMIN — IPRATROPIUM BROMIDE AND ALBUTEROL SULFATE 1 DOSE: .5; 2.5 SOLUTION RESPIRATORY (INHALATION) at 08:29

## 2024-11-07 RX ADMIN — DIAZEPAM 20 MG: 5 TABLET ORAL at 20:37

## 2024-11-07 RX ADMIN — PROPOFOL 50 MCG/KG/MIN: 10 INJECTION, EMULSION INTRAVENOUS at 05:23

## 2024-11-07 RX ADMIN — POTASSIUM CHLORIDE 10 MEQ: 7.45 INJECTION INTRAVENOUS at 08:06

## 2024-11-07 RX ADMIN — LANSOPRAZOLE 30 MG: 30 TABLET, ORALLY DISINTEGRATING ORAL at 08:15

## 2024-11-07 RX ADMIN — SODIUM CHLORIDE: 9 INJECTION, SOLUTION INTRAVENOUS at 23:40

## 2024-11-07 RX ADMIN — SODIUM CHLORIDE 3000 MG: 900 INJECTION INTRAVENOUS at 17:31

## 2024-11-07 ASSESSMENT — PAIN SCALES - GENERAL
PAINLEVEL_OUTOF10: 10
PAINLEVEL_OUTOF10: 8
PAINLEVEL_OUTOF10: 0
PAINLEVEL_OUTOF10: 0

## 2024-11-07 ASSESSMENT — PAIN DESCRIPTION - LOCATION
LOCATION: JAW;ARM
LOCATION: ABDOMEN

## 2024-11-07 ASSESSMENT — PULMONARY FUNCTION TESTS
PIF_VALUE: 20
PIF_VALUE: 18
PIF_VALUE: 21
PIF_VALUE: 18
PIF_VALUE: 20
PIF_VALUE: 13
PIF_VALUE: 11
PIF_VALUE: 19

## 2024-11-07 ASSESSMENT — PAIN DESCRIPTION - DESCRIPTORS
DESCRIPTORS: ACHING
DESCRIPTORS: SHARP

## 2024-11-07 ASSESSMENT — PAIN DESCRIPTION - ORIENTATION
ORIENTATION: RIGHT
ORIENTATION: MID

## 2024-11-07 NOTE — CONSULTS
[facility] -   Infectious diseases evaluation  admission date 11/5/2024    reason for consultation:   Neisseria meningitis cx    Impression :   Current:  LLL pneumonia   Pneumomediastinum  R sub chest wall emphysema  Bandemia 21 -20 - 12  Fever at admission - resolved  Acute toxic encephalopathy  Multidrug abuse, with overdose seems to be the concern  History of seizure related to benzodiazepine withdrawal  Intubated due to acute encephalopathy    Other:  Multi substance abuse, hepatitis C, asthma,  Discussion / summary of stay / plan of care/ Recommendations:     HENCE:   Due to concern for seizures he is receiving LTM EEG  Left lower lobe pneumonia with surrounding groundglass opacity concerning for aspiration pneumonia.  He had sputum with Neisseria meningitidis  Airborne isolation for 72 hours since the beginning of ceftriaxone on 11/7  I called the lab to have another look at the place and confirm that it is truly Neisseria meningitidis and not Moraxella catarrhalis  , Patient has been down 11/7 and has only 1 WBC, not a picture of bacterial meningitis, awaiting culture and PCR panel  Switch the patient to ceftriaxone 2 g a day    Infection Control Recommendations   Custar Precautions  Airborne isolation x 72 hours since treatt started      Antimicrobial Stewardship Recommendations   Simplification of therapy  Targeted therapy      History of Present Illness:   Initial history:  Kole Vega is a 39 y.o.-year-old male who was found unresponsive brought to the ER and was intubated, able to follow simple commands, was found to have a left pneumothorax and a right lower lobe pneumonia with surrounding groundglass opacities.    Sputum culture grew Neisseria meningitidis, lumbar puncture was done and is negative for any infection, infectious was consulted for antibiotic management mostly that it is Neisseria.  His white count upon admission was 21 down to

## 2024-11-07 NOTE — PROCEDURES
PROCEDURE NOTE  Date: 11/7/2024   Name: Kole Vega  YOB: 1985    Procedures            Date: 11/7/2024  Start date: 11/6/2024 @ 1900  End date: 11/7/2024 @ 1900  Referring physician: Dr. Live    Indication  Patient aged 39 y with encephalopathy. EEG done to assess for epileptiform activity.    Introduction  This continuous vEEG was recorded using the 10-20 system on [employer]. Automated seizure detection algorithms were applied.    Description  During the maximal alert state, a poorly-regulated, symmetric, and reactive 7-8 Hz posterior dominant rhythm was seen admixed with faster beta activity likely due to medication effect. No consistent focal slowing or interhemispheric asymmetry was noted. Stage I and stage II sleep were observed. There were no interictal epileptiform discharges or electrographic seizures.    Events:  11/6/2024 @ 2100  Clinically: patient had seizure like activity, eyes seem closed, whole body tense then clonic activity.  EEG: did not correlate with electrographic seizures.    During the event.       Impression  Abnormal awake EEG. The slowing mentioned above suggests mild non specific encephalopathy.     The event captured did not correlate with electrographic seizure.    No epileptiform discharges were identified. Please note the absence of such activity on this record cannot conclusively rule out an epileptic disorder. If such is still clinically suspected, a repeat study with sleep deprivation and/or prolonged sampling may be helpful.    Please note this EEG was meant to screen for emergent condition and is prone to artifact and with some limitations. The interpretation of this EEG result should be taken only with clinical correlation. Ideally regular EEG with full leads should be considered when possible.     Yohana Dorsey MD  Epilepsy Board Certified.  Neurology Board Certified.    Electronically Signed

## 2024-11-07 NOTE — BRIEF OP NOTE
Brief Postoperative Note Lumbar Puncture    Kole Vega  YOB: 1985  [id number]    Pre-operative Diagnosis: AMS    Post-operative Diagnosis: Same    Procedure: Fluoro guided Lumbar Puncture    Anesthesia: 1% Lidocaine    Surgeons/Assistants: Tommie Ortega PA-C    Complications: None    EBL: Minimal    Specimens: Obtained and sent to lab    Fluoroscopy guided lumbar puncture. 20 gauge spinal needle. L3-L4. 8 ml clear CSF obtained.  Dressing applied. Instructions given.    Electronically signed by JOHNATHAN Guerrero on 11/7/2024 at 2:00 PM

## 2024-11-07 NOTE — SIGNIFICANT EVENT
Critical care resident called to bedside for concern for seizure activity. Patient found to be exhibiting movements consistent with generalized tonic-clonic activity. Neurology messaged. Patient given a total of 4 mg Ativan, 4 mg Versed, 1.5 g Keppra, and 100 mg propofol, which finally ceased seizurelike activity. Neurology arrived at bedside and repeated the Keppra dose, and due to changes on exam ordered stat CT head and CTA head and neck, which were ultimately unremarkable. Patient did return to more responsive state later in the night.     Harley Jaramillo MD  Emergency Medicine Resident   11/07/24 1:09 AM

## 2024-11-08 ENCOUNTER — APPOINTMENT (OUTPATIENT)
Dept: GENERAL RADIOLOGY | Age: 39
DRG: 720 | End: 2024-11-08
Payer: COMMERCIAL

## 2024-11-08 LAB
ANION GAP SERPL CALCULATED.3IONS-SCNC: 14 MMOL/L (ref 9–16)
BASOPHILS # BLD: 0.05 K/UL (ref 0–0.2)
BASOPHILS NFR BLD: 1 % (ref 0–2)
BUN SERPL-MCNC: 10 MG/DL (ref 6–20)
CALCIUM SERPL-MCNC: 9.3 MG/DL (ref 8.6–10.4)
CASE NUMBER:: NORMAL
CHLORIDE SERPL-SCNC: 106 MMOL/L (ref 98–107)
CO2 SERPL-SCNC: 18 MMOL/L (ref 20–31)
CREAT SERPL-MCNC: 0.6 MG/DL (ref 0.7–1.2)
EOSINOPHIL # BLD: 0.28 K/UL (ref 0–0.44)
EOSINOPHILS RELATIVE PERCENT: 3 % (ref 1–4)
ERYTHROCYTE [DISTWIDTH] IN BLOOD BY AUTOMATED COUNT: 11.9 % (ref 11.8–14.4)
GFR, ESTIMATED: >90 ML/MIN/1.73M2
GLUCOSE SERPL-MCNC: 98 MG/DL (ref 74–99)
HCT VFR BLD AUTO: 33.8 % (ref 40.7–50.3)
HGB BLD-MCNC: 11.2 G/DL (ref 13–17)
IMM GRANULOCYTES # BLD AUTO: 0.27 K/UL (ref 0–0.3)
IMM GRANULOCYTES NFR BLD: 3 %
LYMPHOCYTES NFR BLD: 1.1 K/UL (ref 1.1–3.7)
LYMPHOCYTES RELATIVE PERCENT: 11 % (ref 24–43)
MCH RBC QN AUTO: 29 PG (ref 25.2–33.5)
MCHC RBC AUTO-ENTMCNC: 33.1 G/DL (ref 28.4–34.8)
MCV RBC AUTO: 87.6 FL (ref 82.6–102.9)
MONOCYTES NFR BLD: 0.48 K/UL (ref 0.1–1.2)
MONOCYTES NFR BLD: 5 % (ref 3–12)
MRSA, DNA, NASAL: ABNORMAL
NEUTROPHILS NFR BLD: 77 % (ref 36–65)
NEUTS SEG NFR BLD: 8.09 K/UL (ref 1.5–8.1)
NRBC BLD-RTO: 0 PER 100 WBC
PLATELET # BLD AUTO: 357 K/UL (ref 138–453)
PMV BLD AUTO: 10 FL (ref 8.1–13.5)
POTASSIUM SERPL-SCNC: 3.8 MMOL/L (ref 3.7–5.3)
RBC # BLD AUTO: 3.86 M/UL (ref 4.21–5.77)
SODIUM SERPL-SCNC: 138 MMOL/L (ref 136–145)
SPECIMEN DESCRIPTION: ABNORMAL
SURGICAL PATHOLOGY REPORT: NORMAL
WBC OTHER # BLD: 10.3 K/UL (ref 3.5–11.3)

## 2024-11-08 PROCEDURE — 6360000002 HC RX W HCPCS: Performed by: INTERNAL MEDICINE

## 2024-11-08 PROCEDURE — 6370000000 HC RX 637 (ALT 250 FOR IP)

## 2024-11-08 PROCEDURE — 99232 SBSQ HOSP IP/OBS MODERATE 35: CPT | Performed by: PSYCHIATRY & NEUROLOGY

## 2024-11-08 PROCEDURE — 1200000000 HC SEMI PRIVATE

## 2024-11-08 PROCEDURE — 85025 COMPLETE CBC W/AUTO DIFF WBC: CPT

## 2024-11-08 PROCEDURE — 2580000003 HC RX 258: Performed by: INTERNAL MEDICINE

## 2024-11-08 PROCEDURE — 2580000003 HC RX 258

## 2024-11-08 PROCEDURE — 71045 X-RAY EXAM CHEST 1 VIEW: CPT

## 2024-11-08 PROCEDURE — 95714 VEEG EA 12-26 HR UNMNTR: CPT

## 2024-11-08 PROCEDURE — 2500000003 HC RX 250 WO HCPCS

## 2024-11-08 PROCEDURE — 99291 CRITICAL CARE FIRST HOUR: CPT | Performed by: INTERNAL MEDICINE

## 2024-11-08 PROCEDURE — 95720 EEG PHY/QHP EA INCR W/VEEG: CPT | Performed by: PSYCHIATRY & NEUROLOGY

## 2024-11-08 PROCEDURE — 80048 BASIC METABOLIC PNL TOTAL CA: CPT

## 2024-11-08 PROCEDURE — APPSS30 APP SPLIT SHARED TIME 16-30 MINUTES: Performed by: NURSE PRACTITIONER

## 2024-11-08 PROCEDURE — 36415 COLL VENOUS BLD VENIPUNCTURE: CPT

## 2024-11-08 PROCEDURE — 99233 SBSQ HOSP IP/OBS HIGH 50: CPT | Performed by: INTERNAL MEDICINE

## 2024-11-08 PROCEDURE — 6370000000 HC RX 637 (ALT 250 FOR IP): Performed by: INTERNAL MEDICINE

## 2024-11-08 RX ORDER — DIAZEPAM 5 MG/1
20 TABLET ORAL
Status: COMPLETED | OUTPATIENT
Start: 2024-11-08 | End: 2024-11-08

## 2024-11-08 RX ORDER — LORAZEPAM 0.5 MG/1
0.5 TABLET ORAL EVERY 6 HOURS PRN
Status: DISCONTINUED | OUTPATIENT
Start: 2024-11-08 | End: 2024-11-08

## 2024-11-08 RX ORDER — HYDROXYZINE HYDROCHLORIDE 50 MG/1
50 TABLET, FILM COATED ORAL 3 TIMES DAILY PRN
Status: DISCONTINUED | OUTPATIENT
Start: 2024-11-08 | End: 2024-11-12 | Stop reason: HOSPADM

## 2024-11-08 RX ORDER — LORAZEPAM 1 MG/1
1 TABLET ORAL EVERY 6 HOURS PRN
Status: COMPLETED | OUTPATIENT
Start: 2024-11-08 | End: 2024-11-11

## 2024-11-08 RX ORDER — ENOXAPARIN SODIUM 100 MG/ML
40 INJECTION SUBCUTANEOUS DAILY
Status: DISCONTINUED | OUTPATIENT
Start: 2024-11-09 | End: 2024-11-12 | Stop reason: HOSPADM

## 2024-11-08 RX ORDER — ENOXAPARIN SODIUM 100 MG/ML
30 INJECTION SUBCUTANEOUS 2 TIMES DAILY
Status: DISCONTINUED | OUTPATIENT
Start: 2024-11-08 | End: 2024-11-08

## 2024-11-08 RX ORDER — DIAZEPAM 5 MG/1
20 TABLET ORAL 2 TIMES DAILY
Status: DISCONTINUED | OUTPATIENT
Start: 2024-11-09 | End: 2024-11-12 | Stop reason: HOSPADM

## 2024-11-08 RX ORDER — METHADONE HYDROCHLORIDE 10 MG/ML
135 CONCENTRATE ORAL DAILY
Status: DISCONTINUED | OUTPATIENT
Start: 2024-11-08 | End: 2024-11-12 | Stop reason: HOSPADM

## 2024-11-08 RX ORDER — METHADONE HYDROCHLORIDE 10 MG/ML
105 CONCENTRATE ORAL DAILY
Status: DISCONTINUED | OUTPATIENT
Start: 2024-11-08 | End: 2024-11-12 | Stop reason: HOSPADM

## 2024-11-08 RX ORDER — DIAZEPAM 5 MG/1
20 TABLET ORAL 3 TIMES DAILY
Status: DISCONTINUED | OUTPATIENT
Start: 2024-11-08 | End: 2024-11-08

## 2024-11-08 RX ADMIN — FENTANYL CITRATE 100 MCG: 50 INJECTION, SOLUTION INTRAMUSCULAR; INTRAVENOUS at 09:08

## 2024-11-08 RX ADMIN — DIAZEPAM 20 MG: 5 TABLET ORAL at 15:37

## 2024-11-08 RX ADMIN — TRAZODONE HYDROCHLORIDE 50 MG: 50 TABLET ORAL at 21:40

## 2024-11-08 RX ADMIN — FENTANYL CITRATE 100 MCG: 50 INJECTION, SOLUTION INTRAMUSCULAR; INTRAVENOUS at 14:50

## 2024-11-08 RX ADMIN — DIAZEPAM 20 MG: 5 TABLET ORAL at 09:07

## 2024-11-08 RX ADMIN — CEFTRIAXONE SODIUM 2000 MG: 2 INJECTION, POWDER, FOR SOLUTION INTRAMUSCULAR; INTRAVENOUS at 21:38

## 2024-11-08 RX ADMIN — METHADONE HYDROCHLORIDE 135 MG: 10 CONCENTRATE ORAL at 21:40

## 2024-11-08 RX ADMIN — DIAZEPAM 20 MG: 5 TABLET ORAL at 22:43

## 2024-11-08 RX ADMIN — FENTANYL CITRATE 100 MCG: 50 INJECTION, SOLUTION INTRAMUSCULAR; INTRAVENOUS at 05:47

## 2024-11-08 RX ADMIN — METHADONE HYDROCHLORIDE 105 MG: 10 CONCENTRATE ORAL at 11:22

## 2024-11-08 RX ADMIN — GABAPENTIN 300 MG: 300 CAPSULE ORAL at 21:40

## 2024-11-08 RX ADMIN — HYDROXYZINE HYDROCHLORIDE 50 MG: 25 TABLET, FILM COATED ORAL at 18:11

## 2024-11-08 RX ADMIN — SERTRALINE HYDROCHLORIDE 50 MG: 50 TABLET ORAL at 09:10

## 2024-11-08 RX ADMIN — DEXMEDETOMIDINE HYDROCHLORIDE 0.2 MCG/KG/HR: 400 INJECTION, SOLUTION INTRAVENOUS at 01:02

## 2024-11-08 RX ADMIN — LANSOPRAZOLE 30 MG: 30 TABLET, ORALLY DISINTEGRATING ORAL at 09:08

## 2024-11-08 RX ADMIN — FENTANYL CITRATE 100 MCG: 50 INJECTION, SOLUTION INTRAMUSCULAR; INTRAVENOUS at 02:14

## 2024-11-08 RX ADMIN — SODIUM CHLORIDE, PRESERVATIVE FREE 10 ML: 5 INJECTION INTRAVENOUS at 09:10

## 2024-11-08 RX ADMIN — FENTANYL CITRATE 100 MCG: 50 INJECTION, SOLUTION INTRAMUSCULAR; INTRAVENOUS at 21:39

## 2024-11-08 RX ADMIN — LORAZEPAM 1 MG: 1 TABLET ORAL at 18:10

## 2024-11-08 ASSESSMENT — PAIN DESCRIPTION - LOCATION
LOCATION: RIB CAGE
LOCATION: OTHER (COMMENT)
LOCATION: ABDOMEN

## 2024-11-08 ASSESSMENT — PAIN SCALES - GENERAL
PAINLEVEL_OUTOF10: 8
PAINLEVEL_OUTOF10: 0
PAINLEVEL_OUTOF10: 0
PAINLEVEL_OUTOF10: 8
PAINLEVEL_OUTOF10: 8
PAINLEVEL_OUTOF10: 6
PAINLEVEL_OUTOF10: 0
PAINLEVEL_OUTOF10: 8
PAINLEVEL_OUTOF10: 0
PAINLEVEL_OUTOF10: 7
PAINLEVEL_OUTOF10: 0
PAINLEVEL_OUTOF10: 10
PAINLEVEL_OUTOF10: 6
PAINLEVEL_OUTOF10: 8
PAINLEVEL_OUTOF10: 7
PAINLEVEL_OUTOF10: 8
PAINLEVEL_OUTOF10: 0
PAINLEVEL_OUTOF10: 8
PAINLEVEL_OUTOF10: 10
PAINLEVEL_OUTOF10: 0
PAINLEVEL_OUTOF10: 0
PAINLEVEL_OUTOF10: 8
PAINLEVEL_OUTOF10: 7
PAINLEVEL_OUTOF10: 8
PAINLEVEL_OUTOF10: 8
PAINLEVEL_OUTOF10: 10
PAINLEVEL_OUTOF10: 0
PAINLEVEL_OUTOF10: 7
PAINLEVEL_OUTOF10: 5

## 2024-11-08 ASSESSMENT — PAIN SCALES - WONG BAKER

## 2024-11-08 ASSESSMENT — PAIN DESCRIPTION - DESCRIPTORS: DESCRIPTORS: NAGGING

## 2024-11-08 ASSESSMENT — PAIN DESCRIPTION - ORIENTATION
ORIENTATION: RIGHT
ORIENTATION: RIGHT

## 2024-11-08 NOTE — TRANSITION OF CARE
Critical care team - Resident sign-out to medicine service      Date and time: 11/8/2024 12:28 PM  Patient's name:  Kole Vega  Medical Record Number: [id number]  Patient's account/billing number: [id number]  Patient's YOB: 1985  Age: 39 y.o.  Date of Admission: 11/5/2024  7:28 AM  Length of stay during current admission: 3    Primary Care Physician: File, Not On, MD    Code Status: Full Code    Mode of physician to physician communication:        [x] Via telephone   [] In person     Date and time of sign-out: 11/8/2024 12:28 PM    Accepting Internal Medicine resident: Dr. Peña    Accepting Medicine team: IM Team 1    Accepting team's attending: Dr. Whalen    Patient's current ICU Bed:  3014     Patient's assigned bed on floor:  510        [x] Med-Surg Monitored [] Step-down       [] Psychiatry ICU       [] Psych floor     Reason for ICU admission:     Altered mental status - intubated     ICU course summary:     Presented to ER from [facility] after being unconscious - mentation remain altered and oxygen requirement kept on increasing - patient was intubated in ER - seizure activity was noted afterwards as well - given Keppra - neurology on board - impression is seizures were secondary to benzodiazepines withdrawal - not on AEDs currently.     He was also started on treatment of aspiration pneumonia with unasyn. His resp culture grew Neisseria Meningitidis. LP was unremarkable and ID was consulted. Antibiotics was changed to ceftriaxone on 11/7 and awaiting results of blood cultures. ID following.     He improved and was successfully extubated. He did have SC emphysema and small pneumomediastinum on CT scan. It is likely secondary to barotrauma. Serial chest x-rays have continued to show improvement in the size of emphysema.     Patient has been started on home dose of valium 20 mg and his methadone dose was confirmed to be 105 and 135 mg (morning and evening). These have been prescribed.

## 2024-11-08 NOTE — PROCEDURES
PROCEDURE NOTE  Date: 11/8/2024   Name: Kole Vega  YOB: 1985    Procedures            Date: 11/8/2024  Start date: 11/7/2024 @ 1900  End date: 11/8/2024 @ 1100  Referring physician: Dr. Live    Indication  Patient aged 39 y with encephalopathy. EEG done to assess for epileptiform activity.    Introduction  This  vEEG was recorded using the 10-20 system on [employer]. Automated seizure detection algorithms were applied.    Description  During the maximal alert state, a poorly-regulated, symmetric, and reactive 7-8 Hz posterior dominant rhythm was seen admixed with faster beta activity likely due to medication effect. No consistent focal slowing or interhemispheric asymmetry was noted. Stage I and stage II sleep were observed. There were no interictal epileptiform discharges or electrographic seizures.    Events:  11/6/2024 @ 2100  Clinically: patient had seizure like activity, eyes seem closed, whole body tense then clonic activity.  EEG: did not correlate with electrographic seizures.    11/7-8/2024:  No events reported.     During the event.       Impression  Abnormal awake EEG. The slowing mentioned above suggests mild non specific encephalopathy.     \   EKG lead did not show clear arrhythmia, if still in concern consider formal EKG or correlation with telemetry.     No epileptiform discharges were identified. Please note the absence of such activity on this record cannot conclusively rule out an epileptic disorder. If such is still clinically suspected, a repeat study with sleep deprivation and/or prolonged sampling may be helpful.      Yohana Dorsey MD  Epilepsy Board Certified.  Neurology Board Certified.    Electronically Signed

## 2024-11-08 NOTE — RT PROTOCOL NOTE
unless patient has one or more of the following: on home nebulizer, not able to hold breath for 10 seconds, is not alert and oriented, cannot activate and use MDI correctly, or respiratory rate 25 breaths per minute or more, then use the equivalent nebulizer order(s) with same Frequency and PRN reasons based on the score.  If a patient is on this medication at home then do not decrease Frequency below that used at home.    0-3 - enter or revise RT bronchodilator order(s) to equivalent RT Bronchodilator order with Frequency of every 4 hours PRN for wheezing or increased work of breathing using Per Protocol order mode.        4-6 - enter or revise RT Bronchodilator order(s) to two equivalent RT bronchodilator orders with one order with BID Frequency and one order with Frequency of every 4 hours PRN wheezing or increased work of breathing using Per Protocol order mode.        7-10 - enter or revise RT Bronchodilator order(s) to two equivalent RT bronchodilator orders with one order with TID Frequency and one order with Frequency of every 4 hours PRN wheezing or increased work of breathing using Per Protocol order mode.       11-13 - enter or revise RT Bronchodilator order(s) to one equivalent RT bronchodilator order with QID Frequency and an Albuterol order with Frequency of every 4 hours PRN wheezing or increased work of breathing using Per Protocol order mode.      Greater than 13 - enter or revise RT Bronchodilator order(s) to one equivalent RT bronchodilator order with every 4 hours Frequency and an Albuterol order with Frequency of every 2 hours PRN wheezing or increased work of breathing using Per Protocol order mode.     RT to enter RT Home Evaluation for COPD & MDI Assessment order using Per Protocol order mode.    Electronically signed by Phoebe Danielle RCP on 11/7/2024 at 7:24 PM  
0 = understands/communicates without difficulty

## 2024-11-09 ENCOUNTER — APPOINTMENT (OUTPATIENT)
Dept: GENERAL RADIOLOGY | Age: 39
DRG: 720 | End: 2024-11-09
Payer: COMMERCIAL

## 2024-11-09 PROBLEM — A39.9 INFECTION DUE TO NEISSERIA MENINGITIDIS: Status: ACTIVE | Noted: 2024-11-09

## 2024-11-09 PROBLEM — T79.7XXA SUBCUTANEOUS EMPHYSEMA (HCC): Status: ACTIVE | Noted: 2024-11-09

## 2024-11-09 LAB
ANION GAP SERPL CALCULATED.3IONS-SCNC: 11 MMOL/L (ref 9–16)
BASOPHILS # BLD: 0.16 K/UL (ref 0–0.2)
BASOPHILS NFR BLD: 2 % (ref 0–2)
BUN SERPL-MCNC: 11 MG/DL (ref 6–20)
CALCIUM SERPL-MCNC: 9 MG/DL (ref 8.6–10.4)
CHLORIDE SERPL-SCNC: 105 MMOL/L (ref 98–107)
CO2 SERPL-SCNC: 20 MMOL/L (ref 20–31)
CREAT SERPL-MCNC: 0.7 MG/DL (ref 0.7–1.2)
EOSINOPHIL # BLD: 0.33 K/UL (ref 0–0.44)
EOSINOPHILS RELATIVE PERCENT: 4 % (ref 1–4)
ERYTHROCYTE [DISTWIDTH] IN BLOOD BY AUTOMATED COUNT: 11.9 % (ref 11.8–14.4)
GFR, ESTIMATED: >90 ML/MIN/1.73M2
GLUCOSE BLD-MCNC: 103 MG/DL (ref 75–110)
GLUCOSE BLD-MCNC: 91 MG/DL (ref 75–110)
GLUCOSE SERPL-MCNC: 99 MG/DL (ref 74–99)
HCT VFR BLD AUTO: 35.6 % (ref 40.7–50.3)
HGB BLD-MCNC: 11.4 G/DL (ref 13–17)
IMM GRANULOCYTES # BLD AUTO: 0.39 K/UL (ref 0–0.3)
IMM GRANULOCYTES NFR BLD: 4 %
LYMPHOCYTES NFR BLD: 2.42 K/UL (ref 1.1–3.7)
LYMPHOCYTES RELATIVE PERCENT: 26 % (ref 24–43)
MAGNESIUM SERPL-MCNC: 2.3 MG/DL (ref 1.6–2.6)
MCH RBC QN AUTO: 29.6 PG (ref 25.2–33.5)
MCHC RBC AUTO-ENTMCNC: 32 G/DL (ref 28.4–34.8)
MCV RBC AUTO: 92.5 FL (ref 82.6–102.9)
MONOCYTES NFR BLD: 0.86 K/UL (ref 0.1–1.2)
MONOCYTES NFR BLD: 9 % (ref 3–12)
NEUTROPHILS NFR BLD: 55 % (ref 36–65)
NEUTS SEG NFR BLD: 5.29 K/UL (ref 1.5–8.1)
NRBC BLD-RTO: 0 PER 100 WBC
PLATELET # BLD AUTO: 325 K/UL (ref 138–453)
PMV BLD AUTO: 9.7 FL (ref 8.1–13.5)
POTASSIUM SERPL-SCNC: 3.2 MMOL/L (ref 3.7–5.3)
RBC # BLD AUTO: 3.85 M/UL (ref 4.21–5.77)
SODIUM SERPL-SCNC: 136 MMOL/L (ref 136–145)
WBC OTHER # BLD: 9.5 K/UL (ref 3.5–11.3)

## 2024-11-09 PROCEDURE — 6370000000 HC RX 637 (ALT 250 FOR IP)

## 2024-11-09 PROCEDURE — 6360000002 HC RX W HCPCS: Performed by: INTERNAL MEDICINE

## 2024-11-09 PROCEDURE — 71045 X-RAY EXAM CHEST 1 VIEW: CPT

## 2024-11-09 PROCEDURE — 85025 COMPLETE CBC W/AUTO DIFF WBC: CPT

## 2024-11-09 PROCEDURE — 6360000002 HC RX W HCPCS

## 2024-11-09 PROCEDURE — 6370000000 HC RX 637 (ALT 250 FOR IP): Performed by: STUDENT IN AN ORGANIZED HEALTH CARE EDUCATION/TRAINING PROGRAM

## 2024-11-09 PROCEDURE — 99233 SBSQ HOSP IP/OBS HIGH 50: CPT | Performed by: INTERNAL MEDICINE

## 2024-11-09 PROCEDURE — 99232 SBSQ HOSP IP/OBS MODERATE 35: CPT | Performed by: STUDENT IN AN ORGANIZED HEALTH CARE EDUCATION/TRAINING PROGRAM

## 2024-11-09 PROCEDURE — 36415 COLL VENOUS BLD VENIPUNCTURE: CPT

## 2024-11-09 PROCEDURE — 2580000003 HC RX 258

## 2024-11-09 PROCEDURE — 2580000003 HC RX 258: Performed by: INTERNAL MEDICINE

## 2024-11-09 PROCEDURE — 99232 SBSQ HOSP IP/OBS MODERATE 35: CPT | Performed by: INTERNAL MEDICINE

## 2024-11-09 PROCEDURE — 83735 ASSAY OF MAGNESIUM: CPT

## 2024-11-09 PROCEDURE — 6370000000 HC RX 637 (ALT 250 FOR IP): Performed by: INTERNAL MEDICINE

## 2024-11-09 PROCEDURE — 82947 ASSAY GLUCOSE BLOOD QUANT: CPT

## 2024-11-09 PROCEDURE — 1200000000 HC SEMI PRIVATE

## 2024-11-09 PROCEDURE — 94761 N-INVAS EAR/PLS OXIMETRY MLT: CPT

## 2024-11-09 PROCEDURE — 80048 BASIC METABOLIC PNL TOTAL CA: CPT

## 2024-11-09 RX ORDER — CLONIDINE HYDROCHLORIDE 0.1 MG/1
0.1 TABLET ORAL DAILY
Status: DISCONTINUED | OUTPATIENT
Start: 2024-11-09 | End: 2024-11-12 | Stop reason: HOSPADM

## 2024-11-09 RX ORDER — CYCLOBENZAPRINE HCL 10 MG
10 TABLET ORAL 3 TIMES DAILY PRN
Status: DISCONTINUED | OUTPATIENT
Start: 2024-11-09 | End: 2024-11-12 | Stop reason: HOSPADM

## 2024-11-09 RX ORDER — LIDOCAINE 4 G/G
1 PATCH TOPICAL DAILY
Status: DISCONTINUED | OUTPATIENT
Start: 2024-11-09 | End: 2024-11-12 | Stop reason: HOSPADM

## 2024-11-09 RX ORDER — CLONIDINE HYDROCHLORIDE 0.1 MG/1
0.2 TABLET ORAL NIGHTLY
Status: DISCONTINUED | OUTPATIENT
Start: 2024-11-09 | End: 2024-11-12 | Stop reason: HOSPADM

## 2024-11-09 RX ORDER — POTASSIUM CHLORIDE 1500 MG/1
40 TABLET, EXTENDED RELEASE ORAL 2 TIMES DAILY WITH MEALS
Status: COMPLETED | OUTPATIENT
Start: 2024-11-09 | End: 2024-11-09

## 2024-11-09 RX ADMIN — ENOXAPARIN SODIUM 40 MG: 100 INJECTION SUBCUTANEOUS at 08:27

## 2024-11-09 RX ADMIN — GABAPENTIN 300 MG: 300 CAPSULE ORAL at 21:25

## 2024-11-09 RX ADMIN — GABAPENTIN 300 MG: 300 CAPSULE ORAL at 13:35

## 2024-11-09 RX ADMIN — CYCLOBENZAPRINE 10 MG: 10 TABLET, FILM COATED ORAL at 11:46

## 2024-11-09 RX ADMIN — POTASSIUM CHLORIDE 40 MEQ: 1500 TABLET, EXTENDED RELEASE ORAL at 18:09

## 2024-11-09 RX ADMIN — CLONIDINE HYDROCHLORIDE 0.1 MG: 0.1 TABLET ORAL at 08:26

## 2024-11-09 RX ADMIN — FENTANYL CITRATE 100 MCG: 50 INJECTION, SOLUTION INTRAMUSCULAR; INTRAVENOUS at 02:22

## 2024-11-09 RX ADMIN — SODIUM CHLORIDE, PRESERVATIVE FREE 10 ML: 5 INJECTION INTRAVENOUS at 23:58

## 2024-11-09 RX ADMIN — DIAZEPAM 20 MG: 5 TABLET ORAL at 08:26

## 2024-11-09 RX ADMIN — WATER 1000 MG: 1 INJECTION INTRAMUSCULAR; INTRAVENOUS; SUBCUTANEOUS at 21:24

## 2024-11-09 RX ADMIN — DIAZEPAM 20 MG: 5 TABLET ORAL at 21:24

## 2024-11-09 RX ADMIN — METHADONE HYDROCHLORIDE 135 MG: 10 CONCENTRATE ORAL at 21:25

## 2024-11-09 RX ADMIN — GABAPENTIN 300 MG: 300 CAPSULE ORAL at 08:26

## 2024-11-09 RX ADMIN — POTASSIUM CHLORIDE 40 MEQ: 1500 TABLET, EXTENDED RELEASE ORAL at 08:27

## 2024-11-09 RX ADMIN — FENTANYL CITRATE 100 MCG: 50 INJECTION, SOLUTION INTRAMUSCULAR; INTRAVENOUS at 23:57

## 2024-11-09 RX ADMIN — SODIUM CHLORIDE, PRESERVATIVE FREE 10 ML: 5 INJECTION INTRAVENOUS at 21:25

## 2024-11-09 RX ADMIN — FENTANYL CITRATE 100 MCG: 50 INJECTION, SOLUTION INTRAMUSCULAR; INTRAVENOUS at 18:08

## 2024-11-09 RX ADMIN — CLONIDINE HYDROCHLORIDE 0.2 MG: 0.1 TABLET ORAL at 21:24

## 2024-11-09 RX ADMIN — METHADONE HYDROCHLORIDE 105 MG: 10 CONCENTRATE ORAL at 09:38

## 2024-11-09 RX ADMIN — SERTRALINE HYDROCHLORIDE 50 MG: 50 TABLET ORAL at 08:27

## 2024-11-09 RX ADMIN — FENTANYL CITRATE 100 MCG: 50 INJECTION, SOLUTION INTRAMUSCULAR; INTRAVENOUS at 08:26

## 2024-11-09 RX ADMIN — SODIUM CHLORIDE, PRESERVATIVE FREE 10 ML: 5 INJECTION INTRAVENOUS at 08:27

## 2024-11-09 RX ADMIN — LANSOPRAZOLE 30 MG: 30 TABLET, ORALLY DISINTEGRATING ORAL at 08:27

## 2024-11-09 ASSESSMENT — PAIN SCALES - GENERAL
PAINLEVEL_OUTOF10: 10

## 2024-11-09 ASSESSMENT — PAIN DESCRIPTION - LOCATION
LOCATION: RIB CAGE

## 2024-11-09 ASSESSMENT — PAIN DESCRIPTION - ORIENTATION
ORIENTATION: RIGHT

## 2024-11-10 ENCOUNTER — APPOINTMENT (OUTPATIENT)
Dept: GENERAL RADIOLOGY | Age: 39
DRG: 720 | End: 2024-11-10
Payer: COMMERCIAL

## 2024-11-10 LAB
ANION GAP SERPL CALCULATED.3IONS-SCNC: 14 MMOL/L (ref 9–16)
BASOPHILS # BLD: 0 K/UL (ref 0–0.2)
BASOPHILS NFR BLD: 0 % (ref 0–2)
BUN SERPL-MCNC: 10 MG/DL (ref 6–20)
CALCIUM SERPL-MCNC: 10 MG/DL (ref 8.6–10.4)
CHLORIDE SERPL-SCNC: 107 MMOL/L (ref 98–107)
CO2 SERPL-SCNC: 19 MMOL/L (ref 20–31)
CREAT SERPL-MCNC: 0.8 MG/DL (ref 0.7–1.2)
EOSINOPHIL # BLD: 0.14 K/UL (ref 0–0.4)
EOSINOPHILS RELATIVE PERCENT: 2 % (ref 1–4)
ERYTHROCYTE [DISTWIDTH] IN BLOOD BY AUTOMATED COUNT: 12.3 % (ref 11.8–14.4)
GFR, ESTIMATED: >90 ML/MIN/1.73M2
GLUCOSE BLD-MCNC: 127 MG/DL (ref 75–110)
GLUCOSE BLD-MCNC: 133 MG/DL (ref 75–110)
GLUCOSE BLD-MCNC: 149 MG/DL (ref 75–110)
GLUCOSE SERPL-MCNC: 115 MG/DL (ref 74–99)
HCT VFR BLD AUTO: 37.1 % (ref 40.7–50.3)
HGB BLD-MCNC: 12 G/DL (ref 13–17)
IMM GRANULOCYTES # BLD AUTO: 0.29 K/UL (ref 0–0.3)
IMM GRANULOCYTES NFR BLD: 4 %
LYMPHOCYTES NFR BLD: 1.73 K/UL (ref 1–4.8)
LYMPHOCYTES RELATIVE PERCENT: 24 % (ref 24–44)
MCH RBC QN AUTO: 28.8 PG (ref 25.2–33.5)
MCHC RBC AUTO-ENTMCNC: 32.3 G/DL (ref 28.4–34.8)
MCV RBC AUTO: 89 FL (ref 82.6–102.9)
MICROORGANISM SPEC CULT: NORMAL
MICROORGANISM/AGENT SPEC: NORMAL
MONOCYTES NFR BLD: 0.5 K/UL (ref 0.1–0.8)
MONOCYTES NFR BLD: 7 % (ref 1–7)
MORPHOLOGY: NORMAL
NEUTROPHILS NFR BLD: 63 % (ref 36–66)
NEUTS SEG NFR BLD: 4.54 K/UL (ref 1.8–7.7)
NRBC BLD-RTO: 0 PER 100 WBC
PLATELET # BLD AUTO: 430 K/UL (ref 138–453)
PMV BLD AUTO: 9.4 FL (ref 8.1–13.5)
POTASSIUM SERPL-SCNC: 5 MMOL/L (ref 3.7–5.3)
RBC # BLD AUTO: 4.17 M/UL (ref 4.21–5.77)
SERVICE CMNT-IMP: NORMAL
SODIUM SERPL-SCNC: 140 MMOL/L (ref 136–145)
SPECIMEN DESCRIPTION: NORMAL
WBC OTHER # BLD: 7.2 K/UL (ref 3.5–11.3)

## 2024-11-10 PROCEDURE — 85025 COMPLETE CBC W/AUTO DIFF WBC: CPT

## 2024-11-10 PROCEDURE — 6370000000 HC RX 637 (ALT 250 FOR IP): Performed by: STUDENT IN AN ORGANIZED HEALTH CARE EDUCATION/TRAINING PROGRAM

## 2024-11-10 PROCEDURE — 6360000002 HC RX W HCPCS

## 2024-11-10 PROCEDURE — 6370000000 HC RX 637 (ALT 250 FOR IP)

## 2024-11-10 PROCEDURE — 93005 ELECTROCARDIOGRAM TRACING: CPT | Performed by: STUDENT IN AN ORGANIZED HEALTH CARE EDUCATION/TRAINING PROGRAM

## 2024-11-10 PROCEDURE — 2580000003 HC RX 258

## 2024-11-10 PROCEDURE — 99232 SBSQ HOSP IP/OBS MODERATE 35: CPT | Performed by: STUDENT IN AN ORGANIZED HEALTH CARE EDUCATION/TRAINING PROGRAM

## 2024-11-10 PROCEDURE — 36415 COLL VENOUS BLD VENIPUNCTURE: CPT

## 2024-11-10 PROCEDURE — 94761 N-INVAS EAR/PLS OXIMETRY MLT: CPT

## 2024-11-10 PROCEDURE — 1200000000 HC SEMI PRIVATE

## 2024-11-10 PROCEDURE — 97535 SELF CARE MNGMENT TRAINING: CPT

## 2024-11-10 PROCEDURE — 97166 OT EVAL MOD COMPLEX 45 MIN: CPT

## 2024-11-10 PROCEDURE — 99232 SBSQ HOSP IP/OBS MODERATE 35: CPT | Performed by: NURSE PRACTITIONER

## 2024-11-10 PROCEDURE — 97162 PT EVAL MOD COMPLEX 30 MIN: CPT

## 2024-11-10 PROCEDURE — 99232 SBSQ HOSP IP/OBS MODERATE 35: CPT | Performed by: INTERNAL MEDICINE

## 2024-11-10 PROCEDURE — 6360000002 HC RX W HCPCS: Performed by: INTERNAL MEDICINE

## 2024-11-10 PROCEDURE — 97530 THERAPEUTIC ACTIVITIES: CPT

## 2024-11-10 PROCEDURE — 80048 BASIC METABOLIC PNL TOTAL CA: CPT

## 2024-11-10 PROCEDURE — 87506 IADNA-DNA/RNA PROBE TQ 6-11: CPT

## 2024-11-10 PROCEDURE — 71045 X-RAY EXAM CHEST 1 VIEW: CPT

## 2024-11-10 PROCEDURE — 6370000000 HC RX 637 (ALT 250 FOR IP): Performed by: INTERNAL MEDICINE

## 2024-11-10 PROCEDURE — APPSS30 APP SPLIT SHARED TIME 16-30 MINUTES: Performed by: NURSE PRACTITIONER

## 2024-11-10 PROCEDURE — 2580000003 HC RX 258: Performed by: INTERNAL MEDICINE

## 2024-11-10 PROCEDURE — 82947 ASSAY GLUCOSE BLOOD QUANT: CPT

## 2024-11-10 RX ADMIN — SODIUM CHLORIDE, PRESERVATIVE FREE 10 ML: 5 INJECTION INTRAVENOUS at 20:27

## 2024-11-10 RX ADMIN — METHADONE HYDROCHLORIDE 135 MG: 10 CONCENTRATE ORAL at 22:03

## 2024-11-10 RX ADMIN — SERTRALINE HYDROCHLORIDE 50 MG: 50 TABLET ORAL at 09:34

## 2024-11-10 RX ADMIN — SODIUM CHLORIDE, PRESERVATIVE FREE 10 ML: 5 INJECTION INTRAVENOUS at 09:34

## 2024-11-10 RX ADMIN — GABAPENTIN 300 MG: 300 CAPSULE ORAL at 20:27

## 2024-11-10 RX ADMIN — ENOXAPARIN SODIUM 40 MG: 100 INJECTION SUBCUTANEOUS at 09:32

## 2024-11-10 RX ADMIN — SODIUM CHLORIDE, PRESERVATIVE FREE 10 ML: 5 INJECTION INTRAVENOUS at 22:03

## 2024-11-10 RX ADMIN — LORAZEPAM 1 MG: 1 TABLET ORAL at 17:30

## 2024-11-10 RX ADMIN — GABAPENTIN 300 MG: 300 CAPSULE ORAL at 09:33

## 2024-11-10 RX ADMIN — LANSOPRAZOLE 30 MG: 30 TABLET, ORALLY DISINTEGRATING ORAL at 09:33

## 2024-11-10 RX ADMIN — DIAZEPAM 20 MG: 5 TABLET ORAL at 20:27

## 2024-11-10 RX ADMIN — FENTANYL CITRATE 100 MCG: 50 INJECTION, SOLUTION INTRAMUSCULAR; INTRAVENOUS at 09:32

## 2024-11-10 RX ADMIN — METHADONE HYDROCHLORIDE 105 MG: 10 CONCENTRATE ORAL at 09:31

## 2024-11-10 RX ADMIN — WATER 1000 MG: 1 INJECTION INTRAMUSCULAR; INTRAVENOUS; SUBCUTANEOUS at 22:03

## 2024-11-10 RX ADMIN — GABAPENTIN 300 MG: 300 CAPSULE ORAL at 15:02

## 2024-11-10 RX ADMIN — CLONIDINE HYDROCHLORIDE 0.2 MG: 0.1 TABLET ORAL at 20:27

## 2024-11-10 RX ADMIN — FENTANYL CITRATE 100 MCG: 50 INJECTION, SOLUTION INTRAMUSCULAR; INTRAVENOUS at 20:26

## 2024-11-10 RX ADMIN — DIAZEPAM 20 MG: 5 TABLET ORAL at 09:33

## 2024-11-10 ASSESSMENT — PAIN SCALES - GENERAL
PAINLEVEL_OUTOF10: 8
PAINLEVEL_OUTOF10: 10
PAINLEVEL_OUTOF10: 10

## 2024-11-10 ASSESSMENT — PAIN DESCRIPTION - LOCATION
LOCATION: RIB CAGE
LOCATION: RIB CAGE

## 2024-11-10 ASSESSMENT — PAIN DESCRIPTION - ORIENTATION
ORIENTATION: RIGHT
ORIENTATION: RIGHT

## 2024-11-11 ENCOUNTER — APPOINTMENT (OUTPATIENT)
Dept: GENERAL RADIOLOGY | Age: 39
DRG: 720 | End: 2024-11-11
Payer: COMMERCIAL

## 2024-11-11 LAB
ANION GAP SERPL CALCULATED.3IONS-SCNC: 12 MMOL/L (ref 9–16)
BASOPHILS # BLD: 0.11 K/UL (ref 0–0.2)
BASOPHILS NFR BLD: 1 % (ref 0–2)
BUN SERPL-MCNC: 10 MG/DL (ref 6–20)
CALCIUM SERPL-MCNC: 9.8 MG/DL (ref 8.6–10.4)
CAMPYLOBACTER DNA SPEC NAA+PROBE: NORMAL
CHLORIDE SERPL-SCNC: 102 MMOL/L (ref 98–107)
CO2 SERPL-SCNC: 24 MMOL/L (ref 20–31)
CREAT SERPL-MCNC: 0.9 MG/DL (ref 0.7–1.2)
EKG ATRIAL RATE: 110 BPM
EKG P AXIS: 43 DEGREES
EKG P-R INTERVAL: 132 MS
EKG Q-T INTERVAL: 332 MS
EKG QRS DURATION: 86 MS
EKG QTC CALCULATION (BAZETT): 449 MS
EKG R AXIS: 24 DEGREES
EKG T AXIS: 58 DEGREES
EKG VENTRICULAR RATE: 110 BPM
EOSINOPHIL # BLD: 0.22 K/UL (ref 0–0.44)
EOSINOPHILS RELATIVE PERCENT: 2 % (ref 1–4)
ERYTHROCYTE [DISTWIDTH] IN BLOOD BY AUTOMATED COUNT: 12.4 % (ref 11.8–14.4)
ETEC ELTA+ESTB GENES STL QL NAA+PROBE: NORMAL
GFR, ESTIMATED: >90 ML/MIN/1.73M2
GLUCOSE BLD-MCNC: 107 MG/DL (ref 75–110)
GLUCOSE BLD-MCNC: 182 MG/DL (ref 75–110)
GLUCOSE BLD-MCNC: 95 MG/DL (ref 75–110)
GLUCOSE SERPL-MCNC: 108 MG/DL (ref 74–99)
HCT VFR BLD AUTO: 38 % (ref 40.7–50.3)
HGB BLD-MCNC: 12.9 G/DL (ref 13–17)
HIV 1+2 AB+HIV1 P24 AG SERPL QL IA: NONREACTIVE
IMM GRANULOCYTES # BLD AUTO: 0.67 K/UL (ref 0–0.3)
IMM GRANULOCYTES NFR BLD: 6 %
LYMPHOCYTES NFR BLD: 2.66 K/UL (ref 1.1–3.7)
LYMPHOCYTES RELATIVE PERCENT: 24 % (ref 24–43)
MCH RBC QN AUTO: 29 PG (ref 25.2–33.5)
MCHC RBC AUTO-ENTMCNC: 33.9 G/DL (ref 28.4–34.8)
MCV RBC AUTO: 85.4 FL (ref 82.6–102.9)
MONOCYTES NFR BLD: 1 K/UL (ref 0.1–1.2)
MONOCYTES NFR BLD: 9 % (ref 3–12)
MORPHOLOGY: NORMAL
NEUTROPHILS NFR BLD: 58 % (ref 36–65)
NEUTS SEG NFR BLD: 6.44 K/UL (ref 1.5–8.1)
NRBC BLD-RTO: 0 PER 100 WBC
P SHIGELLOIDES DNA STL QL NAA+PROBE: NORMAL
PLATELET # BLD AUTO: 445 K/UL (ref 138–453)
PMV BLD AUTO: 9.8 FL (ref 8.1–13.5)
POTASSIUM SERPL-SCNC: 4.3 MMOL/L (ref 3.7–5.3)
RBC # BLD AUTO: 4.45 M/UL (ref 4.21–5.77)
SALMONELLA DNA SPEC QL NAA+PROBE: NORMAL
SHIGA TOXIN STX GENE SPEC NAA+PROBE: NORMAL
SHIGELLA DNA SPEC QL NAA+PROBE: NORMAL
SODIUM SERPL-SCNC: 138 MMOL/L (ref 136–145)
SPECIMEN DESCRIPTION: NORMAL
TSH SERPL DL<=0.05 MIU/L-ACNC: 3.16 UIU/ML (ref 0.27–4.2)
V CHOL+PARA RFBL+TRKH+TNAA STL QL NAA+PR: NORMAL
WBC OTHER # BLD: 11.1 K/UL (ref 3.5–11.3)
Y ENTERO RECN STL QL NAA+PROBE: NORMAL

## 2024-11-11 PROCEDURE — 84443 ASSAY THYROID STIM HORMONE: CPT

## 2024-11-11 PROCEDURE — 6360000002 HC RX W HCPCS: Performed by: INTERNAL MEDICINE

## 2024-11-11 PROCEDURE — 6370000000 HC RX 637 (ALT 250 FOR IP)

## 2024-11-11 PROCEDURE — 6360000002 HC RX W HCPCS

## 2024-11-11 PROCEDURE — 93010 ELECTROCARDIOGRAM REPORT: CPT | Performed by: INTERNAL MEDICINE

## 2024-11-11 PROCEDURE — 1200000000 HC SEMI PRIVATE

## 2024-11-11 PROCEDURE — 36415 COLL VENOUS BLD VENIPUNCTURE: CPT

## 2024-11-11 PROCEDURE — 85025 COMPLETE CBC W/AUTO DIFF WBC: CPT

## 2024-11-11 PROCEDURE — 6370000000 HC RX 637 (ALT 250 FOR IP): Performed by: STUDENT IN AN ORGANIZED HEALTH CARE EDUCATION/TRAINING PROGRAM

## 2024-11-11 PROCEDURE — 99232 SBSQ HOSP IP/OBS MODERATE 35: CPT | Performed by: INTERNAL MEDICINE

## 2024-11-11 PROCEDURE — 2580000003 HC RX 258: Performed by: INTERNAL MEDICINE

## 2024-11-11 PROCEDURE — 71045 X-RAY EXAM CHEST 1 VIEW: CPT

## 2024-11-11 PROCEDURE — 82947 ASSAY GLUCOSE BLOOD QUANT: CPT

## 2024-11-11 PROCEDURE — 6370000000 HC RX 637 (ALT 250 FOR IP): Performed by: INTERNAL MEDICINE

## 2024-11-11 PROCEDURE — 2580000003 HC RX 258

## 2024-11-11 PROCEDURE — 99233 SBSQ HOSP IP/OBS HIGH 50: CPT | Performed by: INTERNAL MEDICINE

## 2024-11-11 PROCEDURE — 80048 BASIC METABOLIC PNL TOTAL CA: CPT

## 2024-11-11 PROCEDURE — 99223 1ST HOSP IP/OBS HIGH 75: CPT | Performed by: GENERAL PRACTICE

## 2024-11-11 PROCEDURE — 87389 HIV-1 AG W/HIV-1&-2 AB AG IA: CPT

## 2024-11-11 RX ORDER — LEVOFLOXACIN 750 MG/1
750 TABLET, FILM COATED ORAL DAILY
Qty: 2 TABLET | Refills: 0 | Status: SHIPPED | OUTPATIENT
Start: 2024-11-11 | End: 2024-11-13

## 2024-11-11 RX ORDER — LACTOBACILLUS RHAMNOSUS GG 10B CELL
1 CAPSULE ORAL
Status: DISCONTINUED | OUTPATIENT
Start: 2024-11-11 | End: 2024-11-12 | Stop reason: HOSPADM

## 2024-11-11 RX ORDER — LEVOFLOXACIN 750 MG/1
750 TABLET, FILM COATED ORAL DAILY
Qty: 2 TABLET | Refills: 0 | Status: SHIPPED | OUTPATIENT
Start: 2024-11-11 | End: 2024-11-11

## 2024-11-11 RX ADMIN — FENTANYL CITRATE 100 MCG: 50 INJECTION, SOLUTION INTRAMUSCULAR; INTRAVENOUS at 18:00

## 2024-11-11 RX ADMIN — GABAPENTIN 300 MG: 300 CAPSULE ORAL at 10:18

## 2024-11-11 RX ADMIN — METHADONE HYDROCHLORIDE 105 MG: 10 CONCENTRATE ORAL at 10:18

## 2024-11-11 RX ADMIN — WATER 1000 MG: 1 INJECTION INTRAMUSCULAR; INTRAVENOUS; SUBCUTANEOUS at 21:28

## 2024-11-11 RX ADMIN — FENTANYL CITRATE 100 MCG: 50 INJECTION, SOLUTION INTRAMUSCULAR; INTRAVENOUS at 23:07

## 2024-11-11 RX ADMIN — DIAZEPAM 20 MG: 5 TABLET ORAL at 10:18

## 2024-11-11 RX ADMIN — SODIUM CHLORIDE, PRESERVATIVE FREE 10 ML: 5 INJECTION INTRAVENOUS at 10:20

## 2024-11-11 RX ADMIN — FENTANYL CITRATE 100 MCG: 50 INJECTION, SOLUTION INTRAMUSCULAR; INTRAVENOUS at 10:23

## 2024-11-11 RX ADMIN — LANSOPRAZOLE 30 MG: 30 TABLET, ORALLY DISINTEGRATING ORAL at 10:18

## 2024-11-11 RX ADMIN — METHADONE HYDROCHLORIDE 135 MG: 10 CONCENTRATE ORAL at 21:37

## 2024-11-11 RX ADMIN — CLONIDINE HYDROCHLORIDE 0.1 MG: 0.1 TABLET ORAL at 10:18

## 2024-11-11 RX ADMIN — DIAZEPAM 20 MG: 5 TABLET ORAL at 21:34

## 2024-11-11 RX ADMIN — GABAPENTIN 300 MG: 300 CAPSULE ORAL at 21:34

## 2024-11-11 RX ADMIN — ENOXAPARIN SODIUM 40 MG: 100 INJECTION SUBCUTANEOUS at 10:18

## 2024-11-11 RX ADMIN — GABAPENTIN 300 MG: 300 CAPSULE ORAL at 14:58

## 2024-11-11 RX ADMIN — SODIUM CHLORIDE, PRESERVATIVE FREE 10 ML: 5 INJECTION INTRAVENOUS at 21:39

## 2024-11-11 RX ADMIN — LORAZEPAM 1 MG: 1 TABLET ORAL at 10:18

## 2024-11-11 RX ADMIN — CLONIDINE HYDROCHLORIDE 0.2 MG: 0.1 TABLET ORAL at 21:34

## 2024-11-11 RX ADMIN — Medication 1 CAPSULE: at 10:17

## 2024-11-11 RX ADMIN — SERTRALINE HYDROCHLORIDE 50 MG: 50 TABLET ORAL at 10:18

## 2024-11-11 ASSESSMENT — ENCOUNTER SYMPTOMS
ABDOMINAL DISTENTION: 0
PHOTOPHOBIA: 0
EYE REDNESS: 0
STRIDOR: 0
DIARRHEA: 0
COUGH: 0
CHEST TIGHTNESS: 0
SINUS PRESSURE: 0
EYE ITCHING: 0
EYE PAIN: 0
EYE DISCHARGE: 0
WHEEZING: 0
ABDOMINAL PAIN: 0
ANAL BLEEDING: 0
BACK PAIN: 0
CHOKING: 0
SHORTNESS OF BREATH: 0
FACIAL SWELLING: 0
COLOR CHANGE: 0

## 2024-11-11 ASSESSMENT — PAIN DESCRIPTION - LOCATION: LOCATION: RIB CAGE

## 2024-11-11 ASSESSMENT — PAIN SCALES - GENERAL: PAINLEVEL_OUTOF10: 10

## 2024-11-11 ASSESSMENT — PAIN DESCRIPTION - ORIENTATION: ORIENTATION: RIGHT

## 2024-11-11 NOTE — CONSULTS
Physical Medicine & Rehabilitation  Consult Note      Admitting Physician:   Shira Live MD    Primary Care Provider:   File, Not On, MD     Reason for Consult:  Acute Inpatient Rehabilitation    Chief Complaint: AMS    History of Present Illness:  Referring Provider is requesting an evaluation for appropriate placement upon discharge from acute care. History from chart review and patient interview.    Kole Vega is a 39 y.o. RHD male admitted to [facility] on 11/5/2024.      39 year old male with a history of hepatitis C, polysubstance abuse, seizure disorder, MDD, anxiety who presented to the emergency department on 11/05/24 from [facility] after he was found unconscious. Neurology was consulted for concerns of seizures and patient was found to have cerebellar device, which showed no seizure activity. In the ED patient was desaturating to the 90s so supplemental oxygen initiating without improvement and patient was subsequently intubated. Patient underwent EEG which showed mild, nonspecific encephalopathy. Urine drug screen positive for benzodiazepines, methadone, cannabinoids, and fentanyl. Patient was extubated on 11/07. He was noted to have leukocytosis and LLL pneumonia with surrounding groundglass opacity concerning for aspiration, sputum culture positive for Neisseria meningitidis, so infectious disease was consulted. Patient was placed in airborne isolation x72 hours and started on ceftriaxone 11/07.    Patient seen and examined. He reports he is doing much better than previous day. Feels his strength is returning. Discussed possible rehab placement with patient and he would prefer to discharge home to his brother.     Review of Systems:  Constitutional: negative for anorexia, chills, fatigue, fevers, sweats and weight loss  Eyes: negative for redness and visual disturbance  Ears, nose, mouth, throat, and face: negative for earaches, sore throat and tinnitus  Respiratory: negative for cough and

## 2024-11-12 VITALS
RESPIRATION RATE: 11 BRPM | DIASTOLIC BLOOD PRESSURE: 85 MMHG | SYSTOLIC BLOOD PRESSURE: 120 MMHG | OXYGEN SATURATION: 99 % | TEMPERATURE: 97.6 F | WEIGHT: 162.26 LBS | HEART RATE: 82 BPM | BODY MASS INDEX: 21.98 KG/M2 | HEIGHT: 72 IN

## 2024-11-12 PROCEDURE — 6370000000 HC RX 637 (ALT 250 FOR IP)

## 2024-11-12 PROCEDURE — 6360000002 HC RX W HCPCS: Performed by: INTERNAL MEDICINE

## 2024-11-12 PROCEDURE — 6370000000 HC RX 637 (ALT 250 FOR IP): Performed by: INTERNAL MEDICINE

## 2024-11-12 PROCEDURE — 2580000003 HC RX 258

## 2024-11-12 PROCEDURE — 6370000000 HC RX 637 (ALT 250 FOR IP): Performed by: STUDENT IN AN ORGANIZED HEALTH CARE EDUCATION/TRAINING PROGRAM

## 2024-11-12 PROCEDURE — 36600 WITHDRAWAL OF ARTERIAL BLOOD: CPT

## 2024-11-12 PROCEDURE — 6360000002 HC RX W HCPCS

## 2024-11-12 RX ORDER — LACTOBACILLUS RHAMNOSUS GG 10B CELL
1 CAPSULE ORAL
Qty: 30 CAPSULE | Refills: 0 | Status: SHIPPED | OUTPATIENT
Start: 2024-11-12 | End: 2024-12-12

## 2024-11-12 RX ADMIN — ENOXAPARIN SODIUM 40 MG: 100 INJECTION SUBCUTANEOUS at 09:49

## 2024-11-12 RX ADMIN — DIAZEPAM 20 MG: 5 TABLET ORAL at 09:47

## 2024-11-12 RX ADMIN — FENTANYL CITRATE 100 MCG: 50 INJECTION, SOLUTION INTRAMUSCULAR; INTRAVENOUS at 06:58

## 2024-11-12 RX ADMIN — SERTRALINE HYDROCHLORIDE 50 MG: 50 TABLET ORAL at 09:49

## 2024-11-12 RX ADMIN — GABAPENTIN 300 MG: 300 CAPSULE ORAL at 09:49

## 2024-11-12 RX ADMIN — FENTANYL CITRATE 100 MCG: 50 INJECTION, SOLUTION INTRAMUSCULAR; INTRAVENOUS at 09:46

## 2024-11-12 RX ADMIN — METHADONE HYDROCHLORIDE 105 MG: 10 CONCENTRATE ORAL at 09:51

## 2024-11-12 RX ADMIN — Medication 1 CAPSULE: at 09:49

## 2024-11-12 RX ADMIN — TRAZODONE HYDROCHLORIDE 50 MG: 50 TABLET ORAL at 01:00

## 2024-11-12 RX ADMIN — CLONIDINE HYDROCHLORIDE 0.1 MG: 0.1 TABLET ORAL at 09:49

## 2024-11-12 RX ADMIN — LANSOPRAZOLE 30 MG: 30 TABLET, ORALLY DISINTEGRATING ORAL at 09:49

## 2024-11-12 RX ADMIN — SODIUM CHLORIDE, PRESERVATIVE FREE 10 ML: 5 INJECTION INTRAVENOUS at 09:47

## 2024-11-12 ASSESSMENT — ENCOUNTER SYMPTOMS
DIARRHEA: 0
CHEST TIGHTNESS: 0
EYE DISCHARGE: 0
BACK PAIN: 0
SHORTNESS OF BREATH: 0
COUGH: 0
SINUS PRESSURE: 0
ABDOMINAL DISTENTION: 0
ABDOMINAL PAIN: 0
EYE PAIN: 0
STRIDOR: 0

## 2024-11-12 ASSESSMENT — PAIN DESCRIPTION - LOCATION
LOCATION: RIB CAGE
LOCATION: ABDOMEN
LOCATION: ABDOMEN
LOCATION: RIB CAGE

## 2024-11-12 ASSESSMENT — PAIN SCALES - GENERAL
PAINLEVEL_OUTOF10: 10
PAINLEVEL_OUTOF10: 9
PAINLEVEL_OUTOF10: 10

## 2024-11-12 ASSESSMENT — PAIN SCALES - WONG BAKER: WONGBAKER_NUMERICALRESPONSE: NO HURT

## 2024-11-12 NOTE — CARE COORDINATION
Discharge Report    [facility]  Clinical Case Management Department  Written by: Kelely Dooley RN    Patient Name: Kloe Vega  Attending Provider: No att. providers found  Admit Date: 2024  7:28 AM  MRN: [id number]  Account: [id number]                     : 1985  Discharge Date: 2024      Disposition: home    Kelley Dooley RN    
Transitional planning-talked with patient. Plan is to go home with his brother. Has ride. States he is getting around in his room.  
Transitional planning. Attempted to leave message for pt's brother, Jose, mailbox full, could not leave message.   
RN  Case Management Department  Ph: [phone] Fax: [phone]

## 2024-11-12 NOTE — PROGRESS NOTES
INTERNAL MEDICINE                                                                             ICU PATIENT TRANSFER NOTE        Patient: Kole Vega  YOB: 1985  MRN: [id number]     Acct: [id number]     Admit date: 11/5/2024    Code Status: Full Code      Reason for ICU Admission:     SUPPORT DEVICES: [] Ventilator  [] BIPAP  [] Nasal Cannula [x] Room Air    Consultations: [] Cardiology [] Nephrology  [] Hemo onco  [] GI                               [] ID [] ENT  [] Rheum [] Endo   [x] infectious disease      NUTRITION: [] NPO [] Tube Feeding (Specify: ) [] TPN [x] PO    Central Lines:  [x] No  [] Yes           If yes - Days/Date of Insertion.      Pt seen and examined at bedside. Chart and results reviewed.      ICU COURSE:   Patient is a 39-year-old male with a past medical history of anxiety, viral hepatitis C, polysubstance abuse, chronic benzodiazepine use, asthma, on methadone, history of seizures related to benzodiazepine withdrawal, MDD.  Patient initially presented to ER from longterm after being found unconscious, mentation remained altered and oxygen requirement kept on increasing patient was intubated in the ER, seizure activity was noted and patient was started on Keppra by neurology.  Seizure was likely secondary to prior diazepam withdrawal.  Patient was also started on Unasyn for aspiration pneumonia.  Respiratory cultures grew Neisseria meningitidis.  ID was consulted and patient was started on ceftriaxone on 11/7.  Blood cultures pending, lumbar puncture unremarkable.  He did have SC emphysema and small pneumomediastinum on CT scan. It is likely secondary to barotrauma. Serial chest x-rays have continued to show improvement in the size of emphysema.      Patient has been started on home dose of valium 20 mg and his methadone dose was 
            [facility] -   Infectious diseases evaluation  admission date 11/5/2024    reason for consultation:   Neisseria meningitis cx    Impression :   Current:  LLL pneumonia - meningococcus on sp cx   Pneumomediastinum  R sub chest wall emphysema  Bandemia 21 -20 - 12  Fever at admission - resolved  Acute toxic encephalopathy  Multidrug abuse, with overdose seems to be the concern  History of seizure related to benzodiazepine withdrawal  Intubated due to acute encephalopathy    Other:  Multi substance abuse, hepatitis C, asthma,  Discussion / summary of stay / plan of care/ Recommendations:   Due to concern for seizures he is receiving LTM EEG  Left lower lobe pneumonia with surrounding groundglass opacity concerning for aspiration pneumonia.  He had sputum 11/5, growing on 11/7  Neisseria meningitidis  ceftriaxone 1 g daily on 11/7 -11/13  I called the lab -Neisseria meningitidis confirmed by another test  LP 11/7 and has only 1 WBC, not a picture of bacterial meningitis, negative  culture and PCR panel  Recommendations:   Ceftriaxone 1 gm IV q 24 hr for N meningitidis in sputum  Stop date 11-12-24.   If patient is ready for discharge otherwise, antibiotics could be switched to po Levaquin to complete treatment.  Infection Control Recommendations   [place] Precautions      Antimicrobial Stewardship Recommendations   Simplification of therapy  Targeted therapy      History of Present Illness:   Initial history:  Kole Vega is a 39 y.o.-year-old male who was found unresponsive brought to the ER and was intubated, able to follow simple commands, was found to have a left pneumothorax and a right lower lobe pneumonia with surrounding groundglass opacities.    Sputum culture grew Neisseria meningitidis, lumbar puncture was done and is negative for any infection, infectious was consulted for antibiotic management mostly that it is Neisseria.  His white count upon 
          [facility] -   Infectious diseases evaluation  admission date 11/5/2024    reason for consultation:   Neisseria meningitis cx    Impression :   Current:  LLL pneumonia - meningococcus on sp cx   Pneumomediastinum  R sub chest wall emphysema  Bandemia 21 -20 - 12  Fever at admission - resolved  Acute toxic encephalopathy  Multidrug abuse, with overdose seems to be the concern  History of seizure related to benzodiazepine withdrawal  Intubated due to acute encephalopathy    Other:  Multi substance abuse, hepatitis C, asthma,  Discussion / summary of stay / plan of care/ Recommendations:     HENCE:   Due to concern for seizures he is receiving LTM EEG  Left lower lobe pneumonia with surrounding groundglass opacity concerning for aspiration pneumonia.  He had sputum 11/5, growing on 11/7  Neisseria meningitidis  ceftriaxone 1 g daily on 11/7 -11/13  I called the lab -Neisseria meningitidis confirmed by another test  LP 11/7 and has only 1 WBC, not a picture of bacterial meningitis, negative  culture and PCR panel      Infection Control Recommendations   [place] Precautions  Droplet  isolation x 72 hours since AB started , till 11/9 PM      Antimicrobial Stewardship Recommendations   Simplification of therapy  Targeted therapy      History of Present Illness:   Initial history:  Kole Vega is a 39 y.o.-year-old male who was found unresponsive brought to the ER and was intubated, able to follow simple commands, was found to have a left pneumothorax and a right lower lobe pneumonia with surrounding groundglass opacities.    Sputum culture grew Neisseria meningitidis, lumbar puncture was done and is negative for any infection, infectious was consulted for antibiotic management mostly that it is Neisseria.  His white count upon admission was 21 down to 12.  He does have a history of asthma hepatitis C chronic opioid benzodiazepine use, and history of benzo withdrawal 
          [facility] -   Infectious diseases evaluation  admission date 11/5/2024    reason for consultation:   Neisseria meningitis cx    Impression :   Current:  LLL pneumonia - meningococcus on sp cx   Pneumomediastinum  R sub chest wall emphysema  Bandemia 21 -20 - 12  Fever at admission - resolved  Acute toxic encephalopathy  Multidrug abuse, with overdose seems to be the concern  History of seizure related to benzodiazepine withdrawal  Intubated due to acute encephalopathy    Other:  Multi substance abuse, hepatitis C, asthma,  Discussion / summary of stay / plan of care/ Recommendations:   LP 11/7 and has only 1 WBC, not a picture of bacterial meningitis, negative  culture and PCR panel  HENCE:   Due to concern for seizures he is receiving LTM EEG  Left lower lobe pneumonia with surrounding groundglass opacity concerning for aspiration pneumonia.  He had sputum 11/5, growing on 11/7  Neisseria meningitidis  ceftriaxone 1 g daily on 11/7 -11/13  Ok to switch to po daily  levaquin at DC till 11/13 - recnsiled    Infection Control Recommendations   [place] Precautions  Droplet  isolation x 72 hours since AB started , till 11/9 PM      Antimicrobial Stewardship Recommendations   Simplification of therapy  Targeted therapy      History of Present Illness:   Initial history:  Kole Vega is a 39 y.o.-year-old male who was found unresponsive brought to the ER and was intubated, able to follow simple commands, was found to have a left pneumothorax and a right lower lobe pneumonia with surrounding groundglass opacities.    Sputum culture grew Neisseria meningitidis, lumbar puncture was done and is negative for any infection, infectious was consulted for antibiotic management mostly that it is Neisseria.  His white count upon admission was 21 down to 12.  He does have a history of asthma hepatitis C chronic opioid benzodiazepine use, and history of benzo withdrawal 
          Pharmacy Note     Renal Dose Adjustment    Kole Vega is a 39 y.o. male. Pharmacist assessment of renally cleared medications.    Recent Labs     11/05/24  0824   BUN 23*       Recent Labs     11/05/24  0807 11/05/24  0824   CREATININE 0.8 1.0       Estimated Creatinine Clearance: 99 mL/min (based on SCr of 1 mg/dL).      Height:   Ht Readings from Last 1 Encounters:   10/27/24 1.829 m (6')     Weight:  Wt Readings from Last 1 Encounters:   11/05/24 70 kg (154 lb 5.2 oz)       The following medication dose has been adjusted based upon renal function per P&T Guidelines:             Unasyn 3gm IV Q8H changed to 3gm Q6H based on indication and CrCl > 30 mL/min      
    [facility]  Internal Medicine Teaching Residency Program  Inpatient Daily Progress Note  ______________________________________________________________________________    Patient: Kole Vega  YOB: 1985   MRN:[id number]    Acct: [id number]     Room: [facility]0510-01  Admit date: 11/5/2024  Today's date: 11/10/24  Number of days in the hospital: 5    SUBJECTIVE   Admitting Diagnosis: Altered mental status  CC:   Chief Complaint   Patient presents with    Altered Mental Status        Pt examined at bedside. Chart & results reviewed.   CXR showing improving subcutaneous emphysema.  Vitals stable, afebrile.    Intake/Output Summary (Last 24 hours) at 11/10/2024 0840  Last data filed at 11/10/2024 0625  Gross per 24 hour   Intake --   Output 950 ml   Net -950 ml      Review of Systems   Constitutional:  Positive for activity change. Negative for fatigue and fever.   HENT:  Negative for congestion and sinus pressure.    Eyes:  Negative for pain and discharge.   Respiratory:  Positive for cough. Negative for chest tightness, shortness of breath and stridor.    Gastrointestinal:  Negative for abdominal distention, abdominal pain and diarrhea.   Endocrine: Negative for cold intolerance and polydipsia.   Genitourinary:  Negative for difficulty urinating, dysuria and flank pain.   Musculoskeletal:  Negative for arthralgias and back pain.   Allergic/Immunologic: Negative for immunocompromised state.   Neurological:  Negative for dizziness and light-headedness.   Psychiatric/Behavioral:  Negative for agitation and confusion.          BRIEF HISTORY     Patient is a 39-year-old male with a past medical history of anxiety, viral hepatitis C, polysubstance abuse, chronic benzodiazepine use, asthma, on methadone, history of seizures related to benzodiazepine withdrawal, MDD.  Patient initially presented to ER from retirement after being found unconscious, mentation remained 
    [facility]  Internal Medicine Teaching Residency Program  Inpatient Daily Progress Note  ______________________________________________________________________________    Patient: Kole Vega  YOB: 1985   MRN:[id number]    Acct: [id number]     Room: [facility]0510-01  Admit date: 11/5/2024  Today's date: 11/11/24  Number of days in the hospital: 6    SUBJECTIVE   Admitting Diagnosis: Altered mental status  CC:   Chief Complaint   Patient presents with    Altered Mental Status      Pt examined at bedside. Chart & results reviewed.   CXR showing improving subcutaneous emphysema.  Vitals stable, afebrile.  Had multiple bowel movements overnight but not loose  Worked with PT OT, getting eval for SNF versus IPR, PMNR consulted  No intake or output data in the 24 hours ending 11/11/24 1129     Review of Systems   Constitutional:  Negative for activity change, fatigue and fever.   HENT:  Negative for congestion and sinus pressure.    Eyes:  Negative for pain and discharge.   Respiratory:  Negative for cough, chest tightness, shortness of breath and stridor.    Gastrointestinal:  Negative for abdominal distention, abdominal pain and diarrhea.   Endocrine: Negative for cold intolerance and polydipsia.   Genitourinary:  Negative for difficulty urinating, dysuria and flank pain.   Musculoskeletal:  Negative for arthralgias and back pain.   Allergic/Immunologic: Negative for immunocompromised state.   Neurological:  Negative for dizziness and light-headedness.   Psychiatric/Behavioral:  Negative for agitation and confusion.          BRIEF HISTORY     Patient is a 39-year-old male with a past medical history of anxiety, viral hepatitis C, polysubstance abuse, chronic benzodiazepine use, asthma, on methadone, history of seizures related to benzodiazepine withdrawal, MDD.  Patient initially presented to ER from intermediate after being found unconscious, mentation remained 
   11/06/24 0822   Patient Observation   ETCO2 (mmHg) 32 mmHg   Breath Sounds   Respiratory Pattern Regular   Breath Sounds Bilateral Clear   Right Upper Lobe Clear   Right Middle Lobe Clear   Right Lower Lobe Clear   Left Upper Lobe Clear   Left Lower Lobe Clear   Vent Information   Vent Mode AC/PRVC   $Ventilation $Subsequent Day   Vent Patient Data (Readings)   Plateau Pressure (cm H2O) 19 cm H2O   PEEP Intrinsic (cm H2O) 1 cm H2O   Static Compliance (L/cm H2O) 74   Dynamic Compliance (L/cm H2O) 69 L/cm H2O   Vent Alarm Settings   High Minute Volume (lpm) 25 L/min   RR Low (bpm) 5   PEEP Low (cm H2O) 2 cmH2O   PEEP High (cm H2O) 16 cm H2O   Additional Respiratoray Assessments   Humidification Source HME   Ambu Bag With Mask At Bedside Yes   Airway Clearance   Suction ET Tube;Oral   Subglottic Suction Done Yes   Suction Device Inline suction catheter   Sputum Method Obtained Endotracheal   Sputum Amount Moderate   Sputum Color/Odor Clear;White   Sputum Consistency Thick;Thin   ETT    Placement Date/Time: 11/05/24 0811   Present on Admission/Arrival: No  Placed By: In ED;Licensed provider  Placement Verified By: Auscultation;Capnometry;Chest X-ray;Colorimetric ETCO2 device  Preoxygenation: Yes  Mask Ventilation: Ventilated by mask ...   Secured At 25 cm   Measured From Lips   ETT Placement Left   Secured By Commercial tube polanco   Cuff Pressure   (mlt)   Ventilator Associated Pneumonia Bundle   Oral Care Completed Yes   Oral Care Performed Mouthwash with chlorhexidine;Mouth swabbed;Mouth suctioned   Oral Suctioning Yes   ETT/Trach Suctioning Yes   Care Plan - Respiratory Goals   Achieves optimal ventilation and oxygenation Respiratory therapy support as indicated;Assess and instruct to report shortness of breath or any respiratory difficulty;Assess the need for suctioning and aspirate as needed;Encourage broncho-pulmonary hygiene including cough, deep breathe, incentive spirometry;Oxygen supplementation based on 
  Neurology Nurse Practitioner Progress Note      INTERVAL HISTORY: This is a 39 y.o.  male admitted 11/5/2024 after being found unresponsive. This is a follow-up neurology progress note. The patient was examined and the chart was reviewed. Discussed with the RN. There were no acute events overnight.  Patient was intubated, on Precedex and fentanyl infusions; opened eyes on verbal stimuli, nodded head appropriately, able to follow simple commands with all 4 limbs. Pt s/p LP.     HPI: Kole Vega is a 39 y.o. male with H/O asthma, Hep C, chronic opioid & benzo use, benzo withdrawal seizure (2021), who was admitted 11/5/2024 after being found unresponsive.  As per medical records, patient was found unresponsive in his cell.  Last known well was around 5 AM when he received his morning medications.  When patient was rechecked around 6 to 6:30 AM he was found unresponsive.  snf staff reported that patient was being treated for benzo withdrawal.  EMS were called who brought the patient to [facility] ED for evaluation.    Patient was intubated while in ED and admitted to ICU under critical care.  Neurology was consulted due to concern for nonconvulsive seizures.  Patient has significant history of chronic opioid and benzo use with prior history of benzo withdrawal seizure in 2021.  Home medication list includes methadone, Valium 20 mg twice daily, Zoloft 150 mg daily, Neurontin 300 mg 3 times daily, Klonopin 0.5 mg 3 times daily as needed for anxiety, Remeron 45 mg nightly and trazodone 50 mg nightly as needed for sleep.      lansoprazole  30 mg Orogastric QAM AC    propofol  100 mg IntraVENous Once    sodium chloride flush  5-40 mL IntraVENous 2 times per day    gabapentin  300 mg Oral TID    diazePAM  20 mg Oral BID    ampicillin-sulbactam  3,000 mg IntraVENous q8h    ipratropium 0.5 mg-albuterol 2.5 mg  1 Dose Inhalation Q4H WA RT    sertraline  50 mg Oral Daily       Past Medical History:   Diagnosis Date    Asthma 
  Physician Progress Note      PATIENT:               EARL MAXWELL  CSN #:                  [id number]  :                       1985  ADMIT DATE:       2024 7:28 AM  DISCH DATE:  RESPONDING  PROVIDER #:        Cameron Live MD          QUERY TEXT:    Pt admitted on   with AMS after being found unresponsive. Pt noted to have   Acute respiratory failure with aspiration pneumonia. Per review  wbc   11.3>21.6>20.8, heart rate 100-120 with Temperature of 39.1 on arrival then   38.2>38.5 . If possible, please document in the progress notes and discharge   summary if you are evaluating and /or treating any of the following:    The medical record reflects the following:  Risk Factors: aspiration pneumonia, encephalopathy, drug use  Clinical Indicators: Pt admitted on   with AMS after being found   unresponsive. Pt noted to have Acute respiratory failure with aspiration   pneumonia. Per review  wbc 11.3>21.6>20.8, heart rate 100-120 with Temperature   of 39.1 on arrival then 38.2>38.5  Treatment: ICU monitoring, mechanical ventilation, iv Unasyn    Thank You Fernando VELEZ BSN CCD  Options provided:  -- Sepsis, present on admission  -- Aspiration pneumonia without Sepsis  -- Other - I will add my own diagnosis  -- Disagree - Not applicable / Not valid  -- Disagree - Clinically unable to determine / Unknown  -- Refer to Clinical Documentation Reviewer    PROVIDER RESPONSE TEXT:    This patient has sepsis which was present on admission.    Query created by: Maryellen Nieves on 2024 6:12 AM      Electronically signed by:  Cameron Live MD 2024 4:11 PM          
 PULMONARY PROGRESS NOTE      Patient:  Kole Vega  YOB: 1985    MRN: [id number]     Acct: [id number]     Admit date: 11/5/2024    REASON FOR INITIAL CONSULT:-Left lower lobe pneumonia    Pt seen and Chart reviewed.    Subjective:   No acute events overnight  Hemodynamically stable and afebrile  On 2 L nasal cannula overnight on room air this morning.      Brief history:  Kole Vega is a 39 y.o. with PMH of   -Anxiety  -Viral hepatitis C  -Polysubstance abuse  -Chronically using benzodiazepines  - Asthma  - On methadone  - Seizures  - MDD     Patient was brought into the ER from [facility] after patient was found unconscious at 6 AM this morning.  Patient was last seen well at 5 AM.  Patient was brought in to the ER.  In the ER occasional minimal twitching in the facial area was noted and patient was noted to have oxygen saturation in the 90s with initial end-tidal CO2 reading noted in the range of 30-32.  Patient was started on supplemental oxygen.  Per note patient was undergoing treatment for withdrawal from benzodiazepines.  Patient continued to require a higher saturations of oxygen.  Due to concerns of seizure neurology was consulted and patient was also found a cerebellar device which showed no seizure activity.  It was noted that patient was requiring more oxygen and decision was taken to intubate the patient.  As the patient was intubated seizure activity spiked in patient.  Patient was given a round of benzodiazepine in 2 g of Keppra.  Patient was also started on Versed and propofol.  Neurology patient most likely had a provoked episode of an responsiveness related to drug overdose.  Patient was slightly awake even on sedation and with movement of his fingers informed physicians that he fell and lost consciousness before he was found.  No other associated signs or symptoms.  Upon admission, pt was AMS, normotensive, tachycardic and a/febrile.      Admission ABG showed pH of 7.48, 
 PULMONARY PROGRESS NOTE      Patient:  Kole Vega  YOB: 1985    MRN: [id number]     Acct: [id number]     Admit date: 11/5/2024    REASON FOR INITIAL CONSULT:-Left lower lobe pneumonia    Pt seen and Chart reviewed.    Subjective:     Interval history 11/10/24 :    Overnight no acute events were reported.  He remained tachycardic heart rate around 100.  He is afebrile Tmax of 99.3 and he is on room air maintaining saturation 98%.  He does not complain of shortness of breath at rest he denies much cough or wheezing.  Complain of pain overall.        Brief history:  Kole Vega is a 39 y.o. with PMH of   -Anxiety  -Viral hepatitis C  -Polysubstance abuse  -Chronically using benzodiazepines  - Asthma  - On methadone  - Seizures  - MDD     Patient was brought into the ER from nursing home after patient was found unconscious at 6 AM this morning.  Patient was last seen well at 5 AM.  Patient was brought in to the ER.  In the ER occasional minimal twitching in the facial area was noted and patient was noted to have oxygen saturation in the 90s with initial end-tidal CO2 reading noted in the range of 30-32.  Patient was started on supplemental oxygen.  Per note patient was undergoing treatment for withdrawal from benzodiazepines.  Patient continued to require a higher saturations of oxygen.  Due to concerns of seizure neurology was consulted and patient was also found a cerebellar device which showed no seizure activity.  It was noted that patient was requiring more oxygen and decision was taken to intubate the patient.  As the patient was intubated seizure activity spiked in patient.  Patient was given a round of benzodiazepine in 2 g of Keppra.  Patient was also started on Versed and propofol.  Neurology patient most likely had a provoked episode of an responsiveness related to drug overdose.  Patient was slightly awake even on sedation and with movement of his fingers informed physicians that he 
CLINICAL PHARMACY NOTE: MEDS TO BEDS    Total # of Prescriptions Filled: 1   The following medications were delivered to the patient:  Levofloxacin 750mg tabs    Additional Documentation:  Delivered to pt in room 510 on 11/12/24 at 11:49A. Only delivered the levofloxacin as the copay was zero; pt's family plans to  the probiotic in the [facility] to pay the $3.08.   
CT tech called to inquire about the urgency of the scan - RN notified tech that scan needs to be a STAT priority. CT tech stated they will call back when the machine is warmed up. RN and RT on standby.  
Comprehensive Nutrition Assessment    Type and Reason for Visit:  Initial (Vent Check)    Nutrition Recommendations/Plan:   Continue NPO.  Monitor plans for nutrition and plan of care.  If nutrition support requested, suggest Tube Feedings of Standard with Fiber (Jevity 1.5) formula goal 25 mL/hr + 2 Protein modulars daily with current rate of propofol.     Malnutrition Assessment:  Malnutrition Status:  Insufficient data (11/06/24 1125)    Context:  Acute Illness     Findings of the 6 clinical characteristics of malnutrition:  Energy Intake:  Mild decrease in energy intake  Weight Loss:  Unable to assess     Body Fat Loss:  Mild body fat loss Orbital   Muscle Mass Loss:  Unable to assess  Fluid Accumulation:  No fluid accumulation   Strength:  Not Performed    Nutrition Assessment:    Admitted for AMS/found unconscious.  Pt currently intubated and sedated.  Propofol running at 18.9 mL/hr.  PMH: seizures, Hep C, h/o polysubstance abuse.  Pt leaving unit at visit for CT for subcutaneous emphysema , rule out pneumo mediastinum and pneumothorax.  Will monitor plans for nutrition and plan of care.  Labs/Meds reviewed.    Nutrition Related Findings:    labs/meds reviewed. Wound Type:  (Traumatic wound to left thigh.)       Current Nutrition Intake & Therapies:    Average Meal Intake: NPO  Average Supplements Intake: NPO  Diet NPO  Additional Calorie Sources:  Propofol at 18.9 mL/hr = 499 kcals/day.    Anthropometric Measures:  Height: 182.9 cm (6' 0.01\")  Ideal Body Weight (IBW): 178 lbs (81 kg)    Admission Body Weight: 70 kg (154 lb 5.2 oz)  Current Body Weight: 68.4 kg (150 lb 12.7 oz), 84.7 % IBW. Weight Source: Bed scale  Current BMI (kg/m2): 20.4           Weight Adjustment For: No Adjustment                 BMI Categories: Normal Weight (BMI 18.5-24.9)    Estimated Daily Nutrient Needs:  Energy Requirements Based On: Kcal/kg  Weight Used for Energy Requirements: Admission  Energy (kcal/day): 9150-3275 
Comprehensive Nutrition Assessment    Type and Reason for Visit:  Reassess    Nutrition Recommendations/Plan:   Continue current diet.  Will provide Ensure oral supplements x 1 per day.  Encourage/monitor intakes as tolerated.  Monitor labs, weights, and plan of care.     Malnutrition Assessment:  Malnutrition Status:  At risk for malnutrition (11/08/24 0316)    Context:  Acute Illness       Nutrition Assessment:    Pt extubated yesterday.  Pt passed a nursing bedside swallow study today and has been started on a Regular diet.  Will provide oral supplements and monitor PO intakes.  Weights reviewed.  Labs/Meds reviewed.    Nutrition Related Findings:    labs/meds reviewed.  +2 non-pitting BUE edema. Wound Type:  (Traumatic wound to left thigh.)       Current Nutrition Intake & Therapies:    Average Meal Intake: Unable to assess  Average Supplements Intake: None Ordered  ADULT DIET; Regular  Additional Calorie Sources:  None    Anthropometric Measures:  Height: 182.9 cm (6' 0.01\")  Ideal Body Weight (IBW): 178 lbs (81 kg)    Admission Body Weight: 70 kg (154 lb 5.2 oz)  Current Body Weight: 68.6 kg (151 lb 3.8 oz), 85 % IBW. Weight Source: Bed scale  Current BMI (kg/m2): 20.5           Weight Adjustment For: No Adjustment                 BMI Categories: Normal Weight (BMI 18.5-24.9)    Estimated Daily Nutrient Needs:  Energy Requirements Based On: Kcal/kg  Weight Used for Energy Requirements: Admission  Energy (kcal/day): 3812-8533 kcals/day  Weight Used for Protein Requirements: Admission  Protein (g/day):  gm pro/day  Method Used for Fluid Requirements: ml/Kg  Fluid (ml/day): 30-35 mL/kg = 0768-9631 mL/day or per MD    Nutrition Diagnosis:   Inadequate oral intake related to  (recent extubation) as evidenced by  (recent start of oral diet; need for oral supplements)    Nutrition Interventions:   Food and/or Nutrient Delivery: Continue Current Diet, Start Oral Nutrition Supplement  Nutrition 
Critical Care Team - Daily Progress Note      Date and time: 11/6/2024 8:22 AM  Patient's name:  Kole Vega  Medical Record Number: [id number]  Patient's account/billing number: [id number]  Patient's YOB: 1985  Age: 39 y.o.  Date of Admission: 11/5/2024  7:28 AM  Length of stay during current admission: 1      Primary Care Physician: Milagro, Not On, MD  ICU Attending Physician:      Code Status: Full Code    Reason for ICU admission:   Chief Complaint   Patient presents with    Altered Mental Status   History was obtained from chart review and the patient.       Kole Vega is a 39 y.o. with PMH of   -Anxiety  -Viral hepatitis C  -Polysubstance abuse  -Chronically using benzodiazepines  - Asthma  - On methadone  - Seizures  - MDD     Patient was brought into the ER from intermediate after patient was found unconscious at 6 AM this morning.  Patient was last seen well at 5 AM.  Patient was brought in to the ER.  In the ER occasional minimal twitching in the facial area was noted and patient was noted to have oxygen saturation in the 90s with initial end-tidal CO2 reading noted in the range of 30-32.  Patient was started on supplemental oxygen.  Per note patient was undergoing treatment for withdrawal from benzodiazepines.  Patient continued to require a higher saturations of oxygen.  Due to concerns of seizure neurology was consulted and patient was also found a cerebellar device which showed no seizure activity.  It was noted that patient was requiring more oxygen and decision was taken to intubate the patient.  As the patient was intubated seizure activity spiked in patient.  Patient was given a round of benzodiazepine in 2 g of Keppra.  Patient was also started on Versed and propofol.  Neurology patient most likely had a provoked episode of an responsiveness related to drug overdose.  Patient was slightly awake even on sedation and with movement of his fingers informed physicians that he 
Critical Care Team - Daily Progress Note      Date and time: 11/8/2024 7:17 AM  Patient's name:  Kole Vega  Medical Record Number: [id number]  Patient's account/billing number: [id number]  Patient's YOB: 1985  Age: 39 y.o.  Date of Admission: 11/5/2024  7:28 AM  Length of stay during current admission: 3      Primary Care Physician: Milagro, Not On, MD  ICU Attending Physician:      Code Status: Full Code    Reason for ICU admission:   Chief Complaint   Patient presents with    Altered Mental Status       Subjective:     OVERNIGHT EVENTS:         No further seizures, continues on AEDs, requesting his methadone     Today:   On Nasal canula with FiO2 of 30 % since last evening - saturating around 97 % mostly   Last gas - pH 7.44, pCO2 37.1, HCO3 25,      Not requiring pressors - MAP around 80 to 106     Sedation - Precedex: 0.4 mcg     I&O - 1875 and output 5,335, Net +1565     Continues on ceftriaxone - LP: unremarkable with nucleated cells: 1     Exam   Stable on 30 % NC, no respiratory distress - however, asking for his methadone and having mild tachypnea due to agitation. Chest - clear, no wheezing, abdo - SNT, no lower extremity edema     Labs  Tgs 245  WBC 21 > 20 > 12.8   HB 15 > 14.9 > 11.4 > 10.4   Plts 244,   Creat: 1 > 0.7 > 0.6, BUN 25 > 9      Resp Cx - neisseria meningitidis     CSF - okay, molecular PCR - negative, no organism on gram stain     CxR - similar appearance of LLL haziness, SC emphysema reducing in size    ID - airborne isolation for 72 hours from ceftriaxone start (11/7), await culture results       Plan - 11/8/24:  DVT prophylaxis - lovenox started    Monitor Hb - consistent with hemodilution at present - stop IV fluids  Wean off oxygen   Wean off precedex  Transfer to med-surg with telemetry   Follow ID and neurology recs - supportive care for seizures   Review culture results - continue ceftriaxone in the meanwhile  Clarify methadone dose - called clinic but not 
In room for LP, brought by ICU RN and rest herapist, on portable vent and monitor.  LA/RN, KP/CT, KT/CT, JS/RN  JR, PALUDIN in, site prepped/draped, accessed for LP  Obtained 8 ml clear CSF, labeled specimen, sending to lab.  Dressing to site. Pt tolerated well.  Back to room with ROSIE Cr and Jes, SHARON.  
Jose and Karen (patient's brother and sister-in-law) are at bedside. RN gave update and updated family information in the chart. Per them, Yelena has a no contact order for Fernando. Fernando is awake enough to write and communicate. Both state that they want the confidential encounter taken off. Family is emotional and have been calling all over trying to locate patient. Family educated on confidential encounters and their purpose; family state that they understand  
Order obtained for extubation.  SpO2 of 100 on 30% FiO2.   Patient extubated and placed on 2 liters/min via nasal cannula.   Post extubation SpO2 is 100% with HR  60 bpm and RR 18 breaths/min.    Patient had strong cough that was productive of white and yellow sputum.  Extubation Well tolerated by patient..   Breath Sounds: clear    ADALI SARAH RCP   4:33 PM  
Patient hooked up to ALTM with MRI/CT compatible wires.  
Pt status- pt passed nursing bedside swallow study  without difficulty  
RN has been communicating with patient through writing on paper. RN gave patient his phone where he was able to log in and text his brother Jose and his significant other Chandrika. Pt attempted to call both but there was no answer and were unable to leave a voicemail. Pt then attempted to call his friend Brittney gave the phone to RN to leave a voicemail. RN left a brief voicemail requesting Brittney to notify Jose and Chandrika to give hospital a call back. Patient is still using his phone to try and contact Jose and Chandrika. RN attempted to call Chandrika and Jose again from hospital phone with all number that patient provided - numbers were either disconnected or voicemails were full.  
Spiritual Health History and Assessment/Progress Note  [facility]    (P) Spiritual/Emotional Needs,  ,  ,      Name: Kole Vega MRN: [id number]    Age: 39 y.o.     Sex: male   Language: English   Congregation: [employer]   Altered mental status     Date: 11/11/2024            Total Time Calculated: (P) 60 min              Spiritual Assessment began in [facility]A [facility]        Referral/Consult From: (P) Nurse   Encounter Overview/Reason: (P) Spiritual/Emotional Needs  Service Provided For: (P) Patient    Patient discussed what he identifies as an unhealthy relationship for 10 years.  Patient plans on moving in with his brother and distancing himself.  Patient also discussed a drug history and states a moment of clarity where he wants to move forward, learn to love himself again, do the right things to take care of his mind and body, bring various healthy support systems into his life, and separate himself from negative influences.  Patient feels confident that he can refuse drugs and continue down a path of a drug free life.      Patient has friends for support who are assisting with his court issues, belongings, and next steps in care.  Patient feels a sense of surprise and hope in the idea that people sincerely care about him.      Patient feels that he needs to learn to love himself more by doing the right things to take care of himself emotionally and physicially.      Maria Isabel, Belief, Meaning:   Patient has beliefs or practices that help with coping during difficult times  Family/Friends Other: Friend bedside for support      Importance and Influence:  Patient has spiritual/personal beliefs that influence decisions regarding their health  Family/Friends Other: Friend bedside as a supportive presence    Community:  Patient feels well-supported. Support system includes: Friends  Family/Friends Other: Friends present and assisting the patient    Assessment and Plan of Care:     Patient 
(methicillin resistant staph aureus) culture positive 9/1/2014    leg    Seizures (HCC)     Viral hepatitis C        Past Surgical History:   Procedure Laterality Date    FOOT DEBRIDEMENT Right 12/11/2023    LOWER EXTREMITY I&D AND APPLICATION OF GRAFT AND WOUND VAC PLACEMENT performed by Ally Garcia DPM at [facility] OR    INCISION AND DRAINAGE FOOT Right 12/11/2023    LOWER EXTREMITY I&D AND APPLICATION OF GRAFT - Right       PHYSICAL EXAM:    Blood pressure 124/68, pulse 81, temperature 99.1 °F (37.3 °C), temperature source Oral, resp. rate 24, height 1.829 m (6'), weight 68.6 kg (151 lb 3.8 oz), SpO2 96%.      ROS: Chronic pain        Limited Neurological Examination:  Mental status   Patient was extubated yesterday; alert & oriented, at his baseline mentation; able to follow simple commands; no hallucination or delusion   Cranial nerves II - XII - grossly intact   Motor function  Strength: Able to raise all limbs antigravity  Normal bulk and tone                Sensory function Grossly intact     Cerebellar No visible tremors   Reflex function 2/4 symmetric throughout  Plantars - flexor   Gait                  Not tested       DATA    Lab Results   Component Value Date    WBC 12.8 (H) 11/07/2024    RBC 3.63 (L) 11/07/2024    HGB 10.4 (L) 11/07/2024    HCT 32.2 (L) 11/07/2024     11/07/2024    ALT 8 (L) 11/05/2024    AST 21 11/05/2024     11/07/2024    K 3.3 (L) 11/07/2024    MG 2.1 11/07/2024     11/07/2024    CREATININE 0.6 (L) 11/07/2024    BUN 9 11/07/2024    CO2 25 11/07/2024     No results found for: \"CHOL\"  Lab Results   Component Value Date    TRIG 245 (H) 11/07/2024     UDS:   11/05/24   09:32   Benzodiazepine POSITIVE !   Cannabinoid  POSITIVE !   Fentanyl POSITIVE !   Methadone  POSITIVE !       CSF STUDY:   11/07/24 15:47   Appearance, CSF Clear   Glucose, CSF 62   Protein, CSF 56.5 (H)   Meningitis panel Negative   Cytology Negative   RBC, CSF 3 (H)   Total Nucleated Cells CSF 1 
wbc   11.3>21.6>20.8, heart rate 100-120 with Temperature of 39.1 on arrival then   38.2>38.5 . If possible, please document in the progress notes and discharge   summary if you are evaluating and /or treating any of the following:    The medical record reflects the following:  Risk Factors: aspiration pneumonia, encephalopathy, drug use  Clinical Indicators: Pt admitted on 11/5  with AMS after being found   unresponsive. Pt noted to have Acute respiratory failure with aspiration   pneumonia. Per review  wbc 11.3>21.6>20.8, heart rate 100-120 with Temperature   of 39.1 on arrival then 38.2>38.5  Treatment: ICU monitoring, mechanical ventilation, iv Unasyn    Thank You Fernando VELEZ BSN CCD  Options provided:  -- Sepsis, present on admission  -- Aspiration pneumonia without Sepsis  -- Other - I will add my own diagnosis  -- Disagree - Not applicable / Not valid  -- Disagree - Clinically unable to determine / Unknown  -- Refer to Clinical Documentation Reviewer    PROVIDER RESPONSE TEXT:    This patient has Aspiration pneumonia without Sepsis.    Query created by: Maryellen Nieves on 11/6/2024 9:32 AM      Electronically signed by:  JIMMIE HOFFMAN 11/6/2024 12:52 PM          
Dressing: Stand by assistance  LE Dressing: Moderate assistance  Toileting: Moderate assistance    Balance  Balance  Sitting: Intact (~20 minutes on eOB)  Standing: High guard (~6 minutes. pt completed static standing at bedside and multiple standing rest breaks during functional mobility. pt initially required min A d/t posterior lean, but then CGA.)    Transfers/Mobility  Bed mobility  Supine to Sit: Stand by assistance  Sit to Supine: Stand by assistance  Scooting: Stand by assistance    Transfers  Sit to stand: Moderate assistance;2 Person assistance  Stand to sit: Moderate assistance;2 Person assistance  Transfer Comments: with RW from EOB         Functional Mobility: Minimal assistance  Functional Mobility Skilled Clinical Factors: pt completed functional mobility to/from window with min A and RW. pt very slow to complete and required multiple standing rest breaks. pt Hr increased to 147 after functional mobility and took a few minutes to decrease to 116 once supine          Patient Education  Patient Education  Education Given To: Patient  Education Provided: Role of Therapy;Plan of Care;Transfer Training  Education Provided Comments: safety during functtional transfers/mobility, balancing rest with movement  Education Method: Verbal  Barriers to Learning: None  Education Outcome: Verbalized understanding;Continued education needed    Goals  Short Term Goals  Time Frame for Short Term Goals: pt will, by discharge  Short Term Goal 1: increase activity tolerance to 25+ minutes in order to participate in daily tasks  Short Term Goal 2: dem ~10 minutes dynamic standing tolerance with SBA and 2 rest breaks in order to complete functional tasks  Short Term Goal 3: dem SBA during functional transfers/functional mobility with LRAD, asneeded  Short Term Goal 4: complete LB ADLS and toileting tasks with min A and AE, as needed  Short Term Goal 5: complete UB ADLs with mod I    Plan  Occupational Therapy Plan  Times Per 
Within Functional Limits  Subjective  Subjective: RN and pt agreeable to PT. pt agreeable and pleasant. pt supine in bed at start of session, c/o R sided pain, rated 10/10, which pt reports is chronic. Pt repositioned for comfort at end of session.         Social/Functional History  Social/Functional History  Lives With: Family (is going to be staying with brother and sister in law)  Type of Home: House  Home Layout: Two level (bathroom up, bed main)  Home Access: Level entry  Entrance Stairs - Number of Steps: 1  Home Equipment: None  ADL Assistance: Independent  Homemaking Assistance: Independent  Homemaking Responsibilities: Yes  Meal Prep Responsibility: Primary  Laundry Responsibility: Primary (laundromat)  Cleaning Responsibility: Primary  Ambulation Assistance: Independent  Transfer Assistance: Independent  Active : No  Patient's  Info: walks, family  Mode of Transportation: Walk  Occupation: Unemployed  Additional Comments: pt reported was living with girlfriend but is not going to return to that home  Vision/Hearing  Vision  Vision: Impaired (contacts)  Hearing  Hearing: Within functional limits    Cognition   Orientation  Overall Orientation Status: Within Functional Limits  Cognition  Overall Cognitive Status: Exceptions  Attention Span: Attends with cues to redirect  Safety Judgement: Decreased awareness of need for assistance  Initiation: Requires cues for some  Sequencing: Requires cues for some  Cognition Comment: pt required min cues for redirection throughout session and sequencing.          Gross Assessment  Sensation: Intact    AROM RLE (degrees)  RLE AROM: WFL  AROM LLE (degrees)  LLE AROM : WFL  AROM RUE (degrees)  RUE AROM : Exceptions  RUE General AROM: Generally decreased, functional (B shoulders 0-90) per OT note  AROM LUE (degrees)  LUE AROM : Exceptions  LUE General AROM: Generally decreased, functional (B shoulders 0-90) per OT note  Strength RLE  Strength RLE: Exception  R 
tablet by mouth daily PATIENT TAKES WITH 100MG TABLET FOR TOTAL DOSE OF 150MG    Pedro Lewis MD   mirtazapine (REMERON) 15 MG tablet Take 1 tablet by mouth nightly PATIENT TAKES WITH 30MG TABLET FOR TOTAL DOSE OF 45MG    Pedro Lewis MD   cloNIDine (CATAPRES) 0.2 MG tablet Take 1 tablet by mouth every evening PATIENT TAKES THE 0.1MG TABLET IN THE MORNING AND THE 0.2MG TABLET IN THE EVENING    Pedro Lewis MD   clonazePAM (KLONOPIN) 0.5 MG tablet Take 1 tablet by mouth 3 times daily as needed for Anxiety.    Pedro Lewis MD   gabapentin (NEURONTIN) 300 MG capsule Take 1 capsule by mouth 3 times daily.    Pedro Lewis MD   traZODone (DESYREL) 50 MG tablet Take 1 tablet by mouth nightly as needed for Sleep    Pedro Lewis MD   cloNIDine (CATAPRES) 0.1 MG tablet Take 1 tablet by mouth every morning PATIENT TAKES THE 0.1MG TABLET IN THE MORNING AND THE 0.2MG TABLET IN THE EVENING    Pedro Lewis MD   mirtazapine (REMERON) 30 MG tablet Take 1 tablet by mouth nightly PATIENT TAKES WITH 15MG TABS FOR TOTAL DOSE OF 45MG    Pedro Lewis MD   sertraline (ZOLOFT) 100 MG tablet Take 1 tablet by mouth daily PATIENT TAKES WITH 50MG TABLET FOR TOTAL DOSE OF 150MG    Pedro Lewis MD   methadone 10 MG/5ML solution 102.5 mLs.    Pedro Lewis MD   diphenhydrAMINE (BENADRYL) 25 MG capsule Take 1 capsule by mouth every 6 hours as needed for Itching for up to 30 doses.  Patient not taking: Reported on 12/9/2023 10/9/14   Nicole Chaparro PA-C        Allergies:     Hydrocodone    Social History:     Tobacco:    reports that he quit smoking about 2 years ago. His smoking use included cigarettes. He has never used smokeless tobacco.  Alcohol:      reports no history of alcohol use.  Drug Use:  reports no history of drug use.    Family History:     History reviewed. No pertinent family history.    Review of Systems:     ROS:  Limited ROS as patient 
Activity    Alcohol use: No    Drug use: No     Comment: clean for 5 years    Sexual activity: Not on file   Other Topics Concern    Not on file   Social History Narrative    Not on file     Social Determinants of Health     Financial Resource Strain: Low Risk  (5/8/2023)    [facility]    Overall Financial Resource Strain (CARDIA)     Difficulty of Paying Living Expenses: Not hard at all   Food Insecurity: Food Insecurity Present (12/9/2023)    Hunger Vital Sign     Worried About Running Out of Food in the Last Year: Sometimes true     Ran Out of Food in the Last Year: Often true   Transportation Needs: Unmet Transportation Needs (12/9/2023)    PRAPARE - Transportation     Lack of Transportation (Medical): Yes     Lack of Transportation (Non-Medical): No   Physical Activity: Sufficiently Active (3/17/2020)    [facility]    Exercise Vital Sign     Days of Exercise per Week: 6 days     Minutes of Exercise per Session: 100 min   Stress: No Stress Concern Present (5/8/2023)    [facility]    [place] [employer] - Occupational Stress Questionnaire     Feeling of Stress : Not at all   Social Connections: Socially Integrated (3/17/2020)    [facility]    Social Connection and Isolation Panel [NHANES]     Frequency of Communication with Friends and Family: More than three times a week     Frequency of Social Gatherings with Friends and Family: Once a week     Attends Sikh Services: 1 to 4 times per year     Active Member of Clubs or Organizations: Yes     Attends Club or Organization Meetings: 1 to 4 times per year     Marital Status: Living with partner   Intimate Partner Violence: Unknown (2/22/2024)    [facility]    [place] Safety & 
11/7  Subcutaneous emphysema  Aspiration pneumonia - LLL - initially on Unasyn and changed to ceftriaxone on 11/7/24 as Resp Cx grew neisseria meningitidis.   ID on board - appreciate recs-to be discharged on Levaquin till 11/3  Known asthma  Extubated on 11/7 - on NC 30 % FiO2 and stable   Repeat CxR - 11/8 - similar LLL opacities and slight reduction in size of right sided SC emphysema - pneumomediastinum not obvious on x-ray, subsequent CXR unremarkable  Continue monitoring - likely barotrauma from ventilation   Pulmonology signed off      Plan for today: PT OT, HIV  Diet: ADULT DIET; Regular  ADULT ORAL NUTRITION SUPPLEMENT; Breakfast, Lunch, Dinner; Standard High Calorie/High Protein Oral Supplement   DVT ppx :  Lovenox    GI ppx:  Lansoprazole    PT/OT: On board, appreciate recommendations  Discharge Planning/SW:  on board, appreciate recommendations, Pt will likely be discharge home To his brother .  Allyssa Casiano MD   Internal Medicine Resident   [facility],  [place], [place].    7:17 AM 11/12/2024     Please note that part of this chart was generated using voice recognition dictation software. Although every effort was made to ensure the accuracy of this automated transcription, some errors in transcription may have occurred.  
dictation software.  Although every effort was made to ensure the accuracy of this automated transcription, some errors in transcription may have occurred.

## 2024-11-12 NOTE — DISCHARGE INSTR - COC
NOT a DME order):  {EQUIPMENT:866932321}  Other Treatments: ***    Patient's personal belongings (please select all that are sent with patient):  {CHP DME Belongings:631379252}    RN SIGNATURE:  {Esignature:[id number]}    CASE MANAGEMENT/SOCIAL WORK SECTION    Inpatient Status Date: ***    Readmission Risk Assessment Score:  Readmission Risk              Risk of Unplanned Readmission:  23           Discharging to Facility/ Agency   Name:   Address:  Phone:  Fax:    Dialysis Facility (if applicable)   Name:  Address:  Dialysis Schedule:  Phone:  Fax:    / signature: {Esignature:[id number]}    PHYSICIAN SECTION    Prognosis: {Prognosis:6703345937}    Condition at Discharge: { Patient Condition:146134818}    Rehab Potential (if transferring to Rehab): {Prognosis:6425856823}    Recommended Labs or Other Treatments After Discharge: ***    Physician Certification: I certify the above information and transfer of Kole Vega  is necessary for the continuing treatment of the diagnosis listed and that he requires {Admit to Appropriate Level of Care:51011} for {GREATER/LESS:460289505} 30 days.     Update Admission H&P: {CHP DME Changes in HandP:764302398}    PHYSICIAN SIGNATURE:  {Esignature:[id number]}

## 2024-11-12 NOTE — DISCHARGE INSTR - DIET

## 2024-11-12 NOTE — PLAN OF CARE
Problem: Discharge Planning  Goal: Discharge to home or other facility with appropriate resources  11/10/2024 0337 by Andreea Santos RN  Outcome: Progressing  11/10/2024 0337 by Andreea Santos RN  Outcome: Progressing  11/9/2024 1851 by Arabella Diaz RN  Outcome: Progressing     Problem: Confusion  Goal: Confusion, delirium, dementia, or psychosis is improved or at baseline  Description: INTERVENTIONS:  1. Assess for possible contributors to thought disturbance, including medications, impaired vision or hearing, underlying metabolic abnormalities, dehydration, psychiatric diagnoses, and notify attending LIP  2. [place] high risk fall precautions, as indicated  3. Provide frequent short contacts to provide reality reorientation, refocusing and direction  4. Decrease environmental stimuli, including noise as appropriate  5. Monitor and intervene to maintain adequate nutrition, hydration, elimination, sleep and activity  6. If unable to ensure safety without constant attention obtain sitter and review sitter guidelines with assigned personnel  7. Initiate Psychosocial CNS and Spiritual Care consult, as indicated  11/10/2024 0337 by Andreea Santos RN  Outcome: Progressing  11/10/2024 0337 by Andreea Santos RN  Outcome: Progressing  11/9/2024 1851 by Arabella Diaz RN  Outcome: Progressing     Problem: Neurosensory - Adult  Goal: Achieves stable or improved neurological status  11/10/2024 0337 by Andreea Santos RN  Outcome: Progressing  11/10/2024 0337 by Andreea Santos RN  Outcome: Progressing  11/9/2024 1851 by Arabella Diaz RN  Outcome: Progressing  Goal: Absence of seizures  11/10/2024 0337 by Andreea Santos RN  Outcome: Progressing  11/10/2024 0337 by Andreea Santos RN  Outcome: Progressing  11/9/2024 1851 by Arabella Diaz, RN  Outcome: Progressing  Goal: Remains free of injury related to seizures activity  11/10/2024 0337 by Danielle 
  Problem: Discharge Planning  Goal: Discharge to home or other facility with appropriate resources  11/11/2024 0413 by Andreea Santos RN  Outcome: Progressing  11/10/2024 1810 by Arabella Diaz RN  Outcome: Progressing     Problem: Confusion  Goal: Confusion, delirium, dementia, or psychosis is improved or at baseline  Description: INTERVENTIONS:  1. Assess for possible contributors to thought disturbance, including medications, impaired vision or hearing, underlying metabolic abnormalities, dehydration, psychiatric diagnoses, and notify attending LIP  2. [place] high risk fall precautions, as indicated  3. Provide frequent short contacts to provide reality reorientation, refocusing and direction  4. Decrease environmental stimuli, including noise as appropriate  5. Monitor and intervene to maintain adequate nutrition, hydration, elimination, sleep and activity  6. If unable to ensure safety without constant attention obtain sitter and review sitter guidelines with assigned personnel  7. Initiate Psychosocial CNS and Spiritual Care consult, as indicated  11/11/2024 0413 by Andreea Santos RN  Outcome: Progressing  11/10/2024 1810 by Arabella Diaz RN  Outcome: Progressing     Problem: Neurosensory - Adult  Goal: Achieves stable or improved neurological status  11/11/2024 0413 by Andreea Santos RN  Outcome: Progressing  11/10/2024 1810 by Arabella Diaz RN  Outcome: Progressing  Goal: Absence of seizures  11/11/2024 0413 by Andreea Santos RN  Outcome: Progressing  11/10/2024 1810 by Arabella Diaz RN  Outcome: Progressing  Goal: Remains free of injury related to seizures activity  11/11/2024 0413 by Andreea Santos RN  Outcome: Progressing  11/10/2024 1810 by Arabella Diaz RN  Outcome: Progressing  Goal: Achieves maximal functionality and self care  11/11/2024 0413 by Andreea Santos RN  Outcome: Progressing  11/10/2024 1810 by Arabella Diaz 
  Problem: Discharge Planning  Goal: Discharge to home or other facility with appropriate resources  11/6/2024 1951 by Ed Castillo RN  Outcome: Progressing  11/6/2024 1927 by Bee Hartmann RN  Outcome: Progressing     Problem: Confusion  Goal: Confusion, delirium, dementia, or psychosis is improved or at baseline  Description: INTERVENTIONS:  1. Assess for possible contributors to thought disturbance, including medications, impaired vision or hearing, underlying metabolic abnormalities, dehydration, psychiatric diagnoses, and notify attending LIP  2. [place] high risk fall precautions, as indicated  3. Provide frequent short contacts to provide reality reorientation, refocusing and direction  4. Decrease environmental stimuli, including noise as appropriate  5. Monitor and intervene to maintain adequate nutrition, hydration, elimination, sleep and activity  6. If unable to ensure safety without constant attention obtain sitter and review sitter guidelines with assigned personnel  7. Initiate Psychosocial CNS and Spiritual Care consult, as indicated  11/6/2024 1951 by Ed Castillo RN  Outcome: Progressing  11/6/2024 1927 by Bee Hartmann RN  Outcome: Progressing     Problem: Neurosensory - Adult  Goal: Achieves stable or improved neurological status  11/6/2024 1951 by Ed Castillo RN  Outcome: Progressing  11/6/2024 1927 by Bee Hartmann RN  Outcome: Progressing  Goal: Absence of seizures  11/6/2024 1951 by Ed Castillo RN  Outcome: Progressing  11/6/2024 1927 by Bee Hartmann RN  Outcome: Progressing  Goal: Remains free of injury related to seizures activity  11/6/2024 1951 by Ed Castillo RN  Outcome: Progressing  11/6/2024 1927 by Bee Hartmann RN  Outcome: Progressing  Goal: Achieves maximal functionality and self care  11/6/2024 1951 by Ed Castillo RN  Outcome: Progressing  11/6/2024 1927 by Bee Hartmann RN  Outcome: 
  Problem: Discharge Planning  Goal: Discharge to home or other facility with appropriate resources  Outcome: Progressing     Problem: Confusion  Goal: Confusion, delirium, dementia, or psychosis is improved or at baseline  Description: INTERVENTIONS:  1. Assess for possible contributors to thought disturbance, including medications, impaired vision or hearing, underlying metabolic abnormalities, dehydration, psychiatric diagnoses, and notify attending LIP  2. [place] high risk fall precautions, as indicated  3. Provide frequent short contacts to provide reality reorientation, refocusing and direction  4. Decrease environmental stimuli, including noise as appropriate  5. Monitor and intervene to maintain adequate nutrition, hydration, elimination, sleep and activity  6. If unable to ensure safety without constant attention obtain sitter and review sitter guidelines with assigned personnel  7. Initiate Psychosocial CNS and Spiritual Care consult, as indicated  Outcome: Progressing     Problem: Neurosensory - Adult  Goal: Achieves stable or improved neurological status  Outcome: Progressing  Goal: Absence of seizures  Outcome: Progressing  Goal: Remains free of injury related to seizures activity  Outcome: Progressing  Goal: Achieves maximal functionality and self care  Outcome: Progressing     Problem: Respiratory - Adult  Goal: Achieves optimal ventilation and oxygenation  Outcome: Progressing     Problem: Cardiovascular - Adult  Goal: Maintains optimal cardiac output and hemodynamic stability  Outcome: Progressing  Goal: Absence of cardiac dysrhythmias or at baseline  Outcome: Progressing     Problem: Skin/Tissue Integrity - Adult  Goal: Skin integrity remains intact  Outcome: Progressing  Goal: Incisions, wounds, or drain sites healing without S/S of infection  Outcome: Progressing  Goal: Oral mucous membranes remain intact  Outcome: Progressing     Problem: Musculoskeletal - Adult  Goal: Return mobility to 
  Problem: Discharge Planning  Goal: Discharge to home or other facility with appropriate resources  Outcome: Progressing     Problem: Confusion  Goal: Confusion, delirium, dementia, or psychosis is improved or at baseline  Description: INTERVENTIONS:  1. Assess for possible contributors to thought disturbance, including medications, impaired vision or hearing, underlying metabolic abnormalities, dehydration, psychiatric diagnoses, and notify attending LIP  2. [place] high risk fall precautions, as indicated  3. Provide frequent short contacts to provide reality reorientation, refocusing and direction  4. Decrease environmental stimuli, including noise as appropriate  5. Monitor and intervene to maintain adequate nutrition, hydration, elimination, sleep and activity  6. If unable to ensure safety without constant attention obtain sitter and review sitter guidelines with assigned personnel  7. Initiate Psychosocial CNS and Spiritual Care consult, as indicated  Outcome: Progressing     Problem: Neurosensory - Adult  Goal: Achieves stable or improved neurological status  Outcome: Progressing  Goal: Absence of seizures  Outcome: Progressing  Goal: Remains free of injury related to seizures activity  Outcome: Progressing  Goal: Achieves maximal functionality and self care  Outcome: Progressing     Problem: Respiratory - Adult  Goal: Achieves optimal ventilation and oxygenation  Outcome: Progressing     Problem: Cardiovascular - Adult  Goal: Maintains optimal cardiac output and hemodynamic stability  Outcome: Progressing  Goal: Absence of cardiac dysrhythmias or at baseline  Outcome: Progressing     Problem: Skin/Tissue Integrity - Adult  Goal: Skin integrity remains intact  Outcome: Progressing  Goal: Incisions, wounds, or drain sites healing without S/S of infection  Outcome: Progressing  Goal: Oral mucous membranes remain intact  Outcome: Progressing     Problem: Musculoskeletal - Adult  Goal: Return mobility to 
  Problem: Discharge Planning  Goal: Discharge to home or other facility with appropriate resources  Outcome: Progressing     Problem: Confusion  Goal: Confusion, delirium, dementia, or psychosis is improved or at baseline  Description: INTERVENTIONS:  1. Assess for possible contributors to thought disturbance, including medications, impaired vision or hearing, underlying metabolic abnormalities, dehydration, psychiatric diagnoses, and notify attending LIP  2. [place] high risk fall precautions, as indicated  3. Provide frequent short contacts to provide reality reorientation, refocusing and direction  4. Decrease environmental stimuli, including noise as appropriate  5. Monitor and intervene to maintain adequate nutrition, hydration, elimination, sleep and activity  6. If unable to ensure safety without constant attention obtain sitter and review sitter guidelines with assigned personnel  7. Initiate Psychosocial CNS and Spiritual Care consult, as indicated  Outcome: Progressing     Problem: Neurosensory - Adult  Goal: Achieves stable or improved neurological status  Outcome: Progressing  Goal: Absence of seizures  Outcome: Progressing  Goal: Remains free of injury related to seizures activity  Outcome: Progressing  Goal: Achieves maximal functionality and self care  Outcome: Progressing     Problem: Respiratory - Adult  Goal: Achieves optimal ventilation and oxygenation  Outcome: Progressing     Problem: Cardiovascular - Adult  Goal: Maintains optimal cardiac output and hemodynamic stability  Outcome: Progressing  Goal: Absence of cardiac dysrhythmias or at baseline  Outcome: Progressing     Problem: Skin/Tissue Integrity - Adult  Goal: Skin integrity remains intact  Outcome: Progressing  Flowsheets (Taken 11/11/2024 2046)  Skin Integrity Remains Intact:   Monitor for areas of redness and/or skin breakdown   Assess vascular access sites hourly   Every 4-6 hours minimum: Change oxygen saturation probe site   
  Problem: Discharge Planning  Goal: Discharge to home or other facility with appropriate resources  Outcome: Progressing     Problem: Confusion  Goal: Confusion, delirium, dementia, or psychosis is improved or at baseline  Description: INTERVENTIONS:  1. Assess for possible contributors to thought disturbance, including medications, impaired vision or hearing, underlying metabolic abnormalities, dehydration, psychiatric diagnoses, and notify attending LIP  2. [place] high risk fall precautions, as indicated  3. Provide frequent short contacts to provide reality reorientation, refocusing and direction  4. Decrease environmental stimuli, including noise as appropriate  5. Monitor and intervene to maintain adequate nutrition, hydration, elimination, sleep and activity  6. If unable to ensure safety without constant attention obtain sitter and review sitter guidelines with assigned personnel  7. Initiate Psychosocial CNS and Spiritual Care consult, as indicated  Outcome: Progressing     Problem: Neurosensory - Adult  Goal: Achieves stable or improved neurological status  Outcome: Progressing  Goal: Absence of seizures  Outcome: Progressing  Goal: Remains free of injury related to seizures activity  Outcome: Progressing  Goal: Achieves maximal functionality and self care  Outcome: Progressing     Problem: Respiratory - Adult  Goal: Achieves optimal ventilation and oxygenation  Outcome: Progressing     Problem: Cardiovascular - Adult  Goal: Maintains optimal cardiac output and hemodynamic stability  Outcome: Progressing  Goal: Absence of cardiac dysrhythmias or at baseline  Outcome: Progressing     Problem: Skin/Tissue Integrity - Adult  Goal: Skin integrity remains intact  Outcome: Progressing  Goal: Incisions, wounds, or drain sites healing without S/S of infection  Outcome: Progressing  Goal: Oral mucous membranes remain intact  Outcome: Progressing     Problem: Musculoskeletal - Adult  Goal: Return mobility to 
  Problem: Discharge Planning  Goal: Discharge to home or other facility with appropriate resources  Outcome: Progressing     Problem: Confusion  Goal: Confusion, delirium, dementia, or psychosis is improved or at baseline  Description: INTERVENTIONS:  1. Assess for possible contributors to thought disturbance, including medications, impaired vision or hearing, underlying metabolic abnormalities, dehydration, psychiatric diagnoses, and notify attending LIP  2. [place] high risk fall precautions, as indicated  3. Provide frequent short contacts to provide reality reorientation, refocusing and direction  4. Decrease environmental stimuli, including noise as appropriate  5. Monitor and intervene to maintain adequate nutrition, hydration, elimination, sleep and activity  6. If unable to ensure safety without constant attention obtain sitter and review sitter guidelines with assigned personnel  7. Initiate Psychosocial CNS and Spiritual Care consult, as indicated  Outcome: Progressing     Problem: Neurosensory - Adult  Goal: Achieves stable or improved neurological status  Outcome: Progressing  Goal: Absence of seizures  Outcome: Progressing  Goal: Remains free of injury related to seizures activity  Outcome: Progressing  Goal: Achieves maximal functionality and self care  Outcome: Progressing     Problem: Respiratory - Adult  Goal: Achieves optimal ventilation and oxygenation  Outcome: Progressing     Problem: Cardiovascular - Adult  Goal: Maintains optimal cardiac output and hemodynamic stability  Outcome: Progressing  Goal: Absence of cardiac dysrhythmias or at baseline  Outcome: Progressing     Problem: Skin/Tissue Integrity - Adult  Goal: Skin integrity remains intact  Outcome: Progressing  Goal: Incisions, wounds, or drain sites healing without S/S of infection  Outcome: Progressing  Goal: Oral mucous membranes remain intact  Outcome: Progressing     Problem: Musculoskeletal - Adult  Goal: Return mobility to 
  Problem: Discharge Planning  Goal: Discharge to home or other facility with appropriate resources  Outcome: Progressing     Problem: Confusion  Goal: Confusion, delirium, dementia, or psychosis is improved or at baseline  Description: INTERVENTIONS:  1. Assess for possible contributors to thought disturbance, including medications, impaired vision or hearing, underlying metabolic abnormalities, dehydration, psychiatric diagnoses, and notify attending LIP  2. [place] high risk fall precautions, as indicated  3. Provide frequent short contacts to provide reality reorientation, refocusing and direction  4. Decrease environmental stimuli, including noise as appropriate  5. Monitor and intervene to maintain adequate nutrition, hydration, elimination, sleep and activity  6. If unable to ensure safety without constant attention obtain sitter and review sitter guidelines with assigned personnel  7. Initiate Psychosocial CNS and Spiritual Care consult, as indicated  Outcome: Progressing     Problem: Neurosensory - Adult  Goal: Achieves stable or improved neurological status  Outcome: Progressing     Problem: Neurosensory - Adult  Goal: Absence of seizures  Outcome: Progressing     Problem: Neurosensory - Adult  Goal: Remains free of injury related to seizures activity  Outcome: Progressing     Problem: Neurosensory - Adult  Goal: Achieves maximal functionality and self care  Outcome: Progressing     Problem: Respiratory - Adult  Goal: Achieves optimal ventilation and oxygenation  Outcome: Progressing  Flowsheets (Taken 11/6/2024 0822 by Maliha Mix RCP)  Achieves optimal ventilation and oxygenation:   Respiratory therapy support as indicated   Assess and instruct to report shortness of breath or any respiratory difficulty   Assess the need for suctioning and aspirate as needed   Encourage broncho-pulmonary hygiene including cough, deep breathe, incentive spirometry   Oxygen supplementation based on oxygen saturation 
  Problem: Respiratory - Adult  Goal: Achieves optimal ventilation and oxygenation  11/5/2024 1949 by Betty Connolly RCP  Outcome: Progressing   BRONCHOSPASM/BRONCHOCONSTRICTION     [x]         IMPROVE AERATION/BREATH SOUNDS  [x]   ADMINISTER BRONCHODILATOR THERAPY AS APPROPRIATE  [x]   ASSESS BREATH SOUNDS  []   IMPLEMENT AEROSOL/MDI PROTOCOL  [x]   PATIENT EDUCATION AS NEEDED  Problem: OXYGENATION/RESPIRATORY FUNCTION  Goal: Patient will maintain patent airway  Outcome: Ongoing  Goal: Patient will achieve/maintain normal respiratory rate/effort  Respiratory rate and effort will be within normal limits for the patient  Outcome: Ongoing    Problem: MECHANICAL VENTILATION  Goal: Patient will maintain patent airway  Outcome: Ongoing  Goal: Oral health is maintained or improved  Outcome: Ongoing  Goal: ET tube will be managed safely  Outcome: Ongoing  Goal: Ability to express needs and understand communication  Outcome: Ongoing  Goal: Mobility/activity is maintained at optimum level for patient  Outcome: Ongoing    Problem: ASPIRATION PRECAUTIONS  Goal: Patient’s risk of aspiration is minimized  Outcome: Ongoing    Problem: SKIN INTEGRITY  Goal: Skin integrity is maintained or improved  Outcome: Ongoing                    
  Problem: Respiratory - Adult  Goal: Achieves optimal ventilation and oxygenation  11/7/2024 0831 by Rebecca Denny RCP  Outcome: Progressing     Problem: Skin/Tissue Integrity - Adult  Goal: Skin integrity remains intact  11/7/2024 0831 by Rebecca Denny RCP  Outcome: Progressing     Problem: Skin/Tissue Integrity - Adult  Goal: Oral mucous membranes remain intact  11/7/2024 0831 by Rebecca Denny, DAPHNE  Outcome: Progressing     
Patient intubated, sedated, and unresponsive. Unable to contact family members to consent for procedure. Respiratory cultures growing Nisseria meningitidis. LP ordered. Due to acuity of patient's condition, patient needs emergent LP.     Rose Mohr MD  Internal Medicine Resident, PGY-2  [facility]; [place], [place]  11/7/2024,12:16 PM    
Absence of infection at discharge  11/5/2024 2151 by Bee Hartmann RN  Outcome: Progressing     Problem: Infection - Adult  Goal: Absence of infection during hospitalization  11/5/2024 2151 by Bee Hartmann RN  Outcome: Progressing     Problem: Infection - Adult  Goal: Absence of fever/infection during anticipated neutropenic period  11/5/2024 2151 by Bee Hartmann RN  Outcome: Progressing     Problem: Metabolic/Fluid and Electrolytes - Adult  Goal: Electrolytes maintained within normal limits  11/5/2024 2151 by Bee Hartmann RN  Outcome: Progressing     Problem: Metabolic/Fluid and Electrolytes - Adult  Goal: Hemodynamic stability and optimal renal function maintained  11/5/2024 2151 by Bee Hartmann RN  Outcome: Progressing     Problem: Metabolic/Fluid and Electrolytes - Adult  Goal: Glucose maintained within prescribed range  11/5/2024 2151 by Bee Hartmann RN  Outcome: Progressing     Problem: Hematologic - Adult  Goal: Maintains hematologic stability  11/5/2024 2151 by Bee Hartmann RN  Outcome: Progressing     Problem: Pain  Goal: Verbalizes/displays adequate comfort level or baseline comfort level  11/5/2024 2151 by Bee Hartmann RN  Outcome: Progressing     Problem: Safety - Adult  Goal: Free from fall injury  11/5/2024 2151 by Bee Hartmann RN  Outcome: Progressing     Problem: Skin/Tissue Integrity  Goal: Absence of new skin breakdown  Description: 1.  Monitor for areas of redness and/or skin breakdown  2.  Assess vascular access sites hourly  3.  Every 4-6 hours minimum:  Change oxygen saturation probe site  4.  Every 4-6 hours:  If on nasal continuous positive airway pressure, respiratory therapy assess nares and determine need for appliance change or resting period.  Outcome: Progressing     Problem: ABCDS Injury Assessment  Goal: Absence of physical injury  Outcome: Progressing     Problem: Safety - Medical 
baseline bowel function  11/7/2024 2255 by Kiarra Guillory RN  Outcome: Progressing  11/7/2024 2027 by Ed Castillo RN  Outcome: Progressing  Goal: Maintains adequate nutritional intake  11/7/2024 2255 by Kiarra Guillory RN  Outcome: Progressing  11/7/2024 2027 by Ed Castillo RN  Outcome: Progressing     Problem: Genitourinary - Adult  Goal: Absence of urinary retention  11/7/2024 2255 by Kiarra Guillory RN  Outcome: Progressing  11/7/2024 2027 by Ed Castillo RN  Outcome: Progressing  Goal: Urinary catheter remains patent  11/7/2024 2255 by Kiarra Guillory RN  Outcome: Progressing  11/7/2024 2027 by Ed Castillo RN  Outcome: Progressing     Problem: Infection - Adult  Goal: Absence of infection at discharge  11/7/2024 2255 by Kiarra Guillory RN  Outcome: Progressing  11/7/2024 2027 by Ed Castillo RN  Outcome: Progressing  Goal: Absence of infection during hospitalization  11/7/2024 2255 by Kiarra Guillory RN  Outcome: Progressing  11/7/2024 2027 by Ed Castillo RN  Outcome: Progressing  Goal: Absence of fever/infection during anticipated neutropenic period  11/7/2024 2255 by Kiarra Guillory RN  Outcome: Progressing  11/7/2024 2027 by Ed Castillo RN  Outcome: Progressing     Problem: Metabolic/Fluid and Electrolytes - Adult  Goal: Electrolytes maintained within normal limits  11/7/2024 2255 by Kiarra Guillory RN  Outcome: Progressing  11/7/2024 2027 by Ed Castillo RN  Outcome: Progressing  Goal: Hemodynamic stability and optimal renal function maintained  11/7/2024 2255 by Kiarra Guillory RN  Outcome: Progressing  11/7/2024 2027 by Ed Castillo RN  Outcome: Progressing  Goal: Glucose maintained within prescribed range  11/7/2024 2255 by Kiarra Guillory RN  Outcome: Progressing  11/7/2024 2027 by Ed Castillo RN  Outcome: Progressing     Problem: Hematologic - Adult  Goal: Maintains hematologic stability  11/7/2024 2255 by Kiarra Guillory RN  Outcome: 
hourly  3.  Every 4-6 hours minimum:  Change oxygen saturation probe site  4.  Every 4-6 hours:  If on nasal continuous positive airway pressure, respiratory therapy assess nares and determine need for appliance change or resting period.  Outcome: Progressing     Problem: ABCDS Injury Assessment  Goal: Absence of physical injury  Outcome: Progressing     Problem: Safety - Medical Restraint  Goal: Remains free of injury from restraints (Restraint for Interference with Medical Device)  Description: INTERVENTIONS:  1. Determine that other, less restrictive measures have been tried or would not be effective before applying the restraint  2. Evaluate the patient's condition at the time of restraint application  3. Inform patient/family regarding the reason for restraint  4. Q2H: Monitor safety, psychosocial status, comfort, nutrition and hydration  Outcome: Progressing  Flowsheets  Taken 11/7/2024 1600  Remains free of injury from restraints (restraint for interference with medical device): Every 2 hours: Monitor safety, psychosocial status, comfort, nutrition and hydration  Taken 11/7/2024 1400  Remains free of injury from restraints (restraint for interference with medical device): Every 2 hours: Monitor safety, psychosocial status, comfort, nutrition and hydration  Taken 11/7/2024 1200  Remains free of injury from restraints (restraint for interference with medical device): Every 2 hours: Monitor safety, psychosocial status, comfort, nutrition and hydration  Taken 11/7/2024 1000  Remains free of injury from restraints (restraint for interference with medical device): Every 2 hours: Monitor safety, psychosocial status, comfort, nutrition and hydration  Taken 11/7/2024 0800  Remains free of injury from restraints (restraint for interference with medical device): Every 2 hours: Monitor safety, psychosocial status, comfort, nutrition and hydration     Problem: Nutrition Deficit:  Goal: Optimize nutritional

## 2024-11-12 NOTE — DISCHARGE INSTRUCTIONS
You came to the hospital due to altered mental status which did not likely secondary to drug overdose, please stop using illicit drugs.    You were also found to have pneumonia for which you received IV antibiotics and will discharge on oral antibiotics, please take levofloxacin daily for 2 days to complete the full course of antibiotics.    You are also found to have a seizure and neurology team saw you and they do not recommend any antiseizure medication at this time as your seizure was likely provoked by drug use.    We resumed your Valium and methadone dosages please continue to take them as prescribed previously as we did not change any doses.  And follow-up with methadone clinic.    Please follow-up with primary care provider during a week after discharge    Please come back to the ED or call 911 if your symptoms worsens or you start noticing any new concerning symptoms like chest pain, shortness breath, nausea, abdominal pain, diarrhea, blood in stool, blood in urine or any other concerning symptoms.

## 2024-11-16 LAB
DIAZEPAM LEVEL: 123 NG/ML (ref 200–1000)
Lab: 112 NG/ML (ref 100–1500)

## 2025-04-29 NOTE — PROGRESS NOTES
[facility]  [address]   [address] [place] [address]  Tel: [phone]   Fax: [phone]    Subjective     CC: Postop visit for wound debridement with skin graft (DOS 12/9/2023)  POV #5    Interval history:  Patient returns today for assessment of his right leg lower extremity wounds.  He was previously seen 2 weeks ago.  At that time he underwent PuraPly graft application with wet-to-dry dressing.  He has had the dressing off for about a week.  The wounds are healed with scabbing.    However patient has a new complaint of left cubital fossa wound measuring about 1 cm x 1 cm x 0.5 cm.  Patient had been going to a clinic for methadone but was unable to get there about 2 weeks ago.  At that time he injected himself with a needle.  He has not seen a provider for care of this wound.    HPI:  Kole Vega is a 38 y.o. year old male who presents to clinic today for postoperative visit for placement of skin graft with wound VAC on 12/9/2023.  Patient states that he has been compliant with restrictions including keeping wound dressing clean and dry and intact at all times, changing filter on wound VAC as needed.  Patient states that he is currently partially weightbearing.  Patient denies any current pain, or any problems with the wound VAC.  Patient denies any signs of infection including nausea, vomiting, fever, chills, shortness of breath.  Patient to begin seeing wound clinic on 12/21/2023.  Primary care physician is File, Not On, FNP.    ROS:    Constitutional: Denies nausea, vomiting, fever, chills.  Neurologic: Denies numbness, tingling, and burning in the feet.    Vascular: Denies symptoms of lower extremity claudication.    Skin: Scabs to right lower extremity x 2.  Wound to cubital fossa of left arm.  Otherwise negative except as noted in the HPI.     PMH:  Past Medical History:   Diagnosis Date    Asthma     MRSA (methicillin resistant staph aureus) culture positive 9/1/2014    
Patient instructed to remove shoes and socks and instructed to sit in exam chair.  Current PCP is File, Not On, FNP and date of last visit was unknown.   Do you have a follow up visit scheduled?  No  If yes, the date is     
See 4/29/25 TE  
denies pain/discomfort (Rating = 0)

## 2025-07-21 ENCOUNTER — APPOINTMENT (OUTPATIENT)
Dept: GENERAL RADIOLOGY | Age: 40
End: 2025-07-21
Payer: COMMERCIAL

## 2025-07-21 ENCOUNTER — HOSPITAL ENCOUNTER (EMERGENCY)
Age: 40
Discharge: HOME | End: 2025-07-21
Attending: EMERGENCY MEDICINE
Payer: COMMERCIAL

## 2025-07-21 VITALS
DIASTOLIC BLOOD PRESSURE: 93 MMHG | SYSTOLIC BLOOD PRESSURE: 133 MMHG | OXYGEN SATURATION: 98 % | RESPIRATION RATE: 20 BRPM | TEMPERATURE: 97.9 F | HEART RATE: 69 BPM

## 2025-07-21 DIAGNOSIS — F13.930 BENZODIAZEPINE WITHDRAWAL WITHOUT COMPLICATION (HCC): Primary | ICD-10-CM

## 2025-07-21 LAB
ALBUMIN SERPL-MCNC: 4.7 G/DL (ref 3.5–5.2)
ALBUMIN/GLOB SERPL: 1.1 {RATIO} (ref 1–2.5)
ALP SERPL-CCNC: 137 U/L (ref 40–129)
ALT SERPL-CCNC: 9 U/L (ref 10–50)
ANION GAP SERPL CALCULATED.3IONS-SCNC: 17 MMOL/L (ref 9–16)
AST SERPL-CCNC: 20 U/L (ref 10–50)
BASOPHILS # BLD: <0.03 K/UL (ref 0–0.2)
BASOPHILS NFR BLD: 0 % (ref 0–2)
BILIRUB SERPL-MCNC: 0.4 MG/DL (ref 0–1.2)
BUN SERPL-MCNC: 10 MG/DL (ref 6–20)
CALCIUM SERPL-MCNC: 10.5 MG/DL (ref 8.6–10.4)
CHLORIDE SERPL-SCNC: 96 MMOL/L (ref 98–107)
CO2 SERPL-SCNC: 22 MMOL/L (ref 20–31)
CREAT SERPL-MCNC: 1 MG/DL (ref 0.7–1.2)
EOSINOPHIL # BLD: <0.03 K/UL (ref 0–0.44)
EOSINOPHILS RELATIVE PERCENT: 0 % (ref 1–4)
ERYTHROCYTE [DISTWIDTH] IN BLOOD BY AUTOMATED COUNT: 11.6 % (ref 11.8–14.4)
GFR, ESTIMATED: >90 ML/MIN/1.73M2
GLUCOSE SERPL-MCNC: 119 MG/DL (ref 74–99)
HCT VFR BLD AUTO: 40 % (ref 40.7–50.3)
HGB BLD-MCNC: 13.8 G/DL (ref 13–17)
IMM GRANULOCYTES # BLD AUTO: 0.06 K/UL (ref 0–0.3)
IMM GRANULOCYTES NFR BLD: 1 %
LIPASE SERPL-CCNC: 10 U/L (ref 13–60)
LYMPHOCYTES NFR BLD: 0.92 K/UL (ref 1.1–3.7)
LYMPHOCYTES RELATIVE PERCENT: 10 % (ref 24–43)
MCH RBC QN AUTO: 29.3 PG (ref 25.2–33.5)
MCHC RBC AUTO-ENTMCNC: 34.5 G/DL (ref 28.4–34.8)
MCV RBC AUTO: 84.9 FL (ref 82.6–102.9)
MONOCYTES NFR BLD: 0.47 K/UL (ref 0.1–1.2)
MONOCYTES NFR BLD: 5 % (ref 3–12)
NEUTROPHILS NFR BLD: 84 % (ref 36–65)
NEUTS SEG NFR BLD: 7.53 K/UL (ref 1.5–8.1)
NRBC BLD-RTO: 0 PER 100 WBC
PLATELET # BLD AUTO: 392 K/UL (ref 138–453)
PMV BLD AUTO: 9.1 FL (ref 8.1–13.5)
POTASSIUM SERPL-SCNC: 3.5 MMOL/L (ref 3.7–5.3)
PROT SERPL-MCNC: 9.1 G/DL (ref 6.6–8.7)
RBC # BLD AUTO: 4.71 M/UL (ref 4.21–5.77)
SODIUM SERPL-SCNC: 135 MMOL/L (ref 136–145)
TROPONIN I SERPL HS-MCNC: <6 NG/L (ref 0–22)
TROPONIN I SERPL HS-MCNC: <6 NG/L (ref 0–22)
WBC OTHER # BLD: 9 K/UL (ref 3.5–11.3)

## 2025-07-21 PROCEDURE — 99285 EMERGENCY DEPT VISIT HI MDM: CPT | Performed by: EMERGENCY MEDICINE

## 2025-07-21 PROCEDURE — 83690 ASSAY OF LIPASE: CPT

## 2025-07-21 PROCEDURE — 71045 X-RAY EXAM CHEST 1 VIEW: CPT

## 2025-07-21 PROCEDURE — 84484 ASSAY OF TROPONIN QUANT: CPT

## 2025-07-21 PROCEDURE — 80053 COMPREHEN METABOLIC PANEL: CPT

## 2025-07-21 PROCEDURE — 6370000000 HC RX 637 (ALT 250 FOR IP)

## 2025-07-21 PROCEDURE — 93005 ELECTROCARDIOGRAM TRACING: CPT

## 2025-07-21 PROCEDURE — 96374 THER/PROPH/DIAG INJ IV PUSH: CPT | Performed by: EMERGENCY MEDICINE

## 2025-07-21 PROCEDURE — 6360000002 HC RX W HCPCS

## 2025-07-21 PROCEDURE — 85025 COMPLETE CBC W/AUTO DIFF WBC: CPT

## 2025-07-21 RX ORDER — ALPRAZOLAM 0.5 MG
0.5 TABLET ORAL 3 TIMES DAILY PRN
Qty: 9 TABLET | Refills: 0 | Status: SHIPPED | OUTPATIENT
Start: 2025-07-21 | End: 2025-07-24

## 2025-07-21 RX ORDER — CLONIDINE HYDROCHLORIDE 0.1 MG/1
0.1 TABLET ORAL EVERY MORNING
Qty: 7 TABLET | Refills: 0 | Status: SHIPPED | OUTPATIENT
Start: 2025-07-21 | End: 2025-07-28

## 2025-07-21 RX ORDER — ALPRAZOLAM 0.25 MG
0.5 TABLET ORAL ONCE
Status: COMPLETED | OUTPATIENT
Start: 2025-07-21 | End: 2025-07-21

## 2025-07-21 RX ORDER — ONDANSETRON 2 MG/ML
4 INJECTION INTRAMUSCULAR; INTRAVENOUS ONCE
Status: COMPLETED | OUTPATIENT
Start: 2025-07-21 | End: 2025-07-21

## 2025-07-21 RX ORDER — CLONIDINE HYDROCHLORIDE 0.2 MG/1
0.2 TABLET ORAL EVERY EVENING
Qty: 7 TABLET | Refills: 0 | Status: SHIPPED | OUTPATIENT
Start: 2025-07-21 | End: 2025-07-28

## 2025-07-21 RX ORDER — ONDANSETRON 4 MG/1
4 TABLET, ORALLY DISINTEGRATING ORAL 3 TIMES DAILY PRN
Qty: 9 TABLET | Refills: 0 | Status: SHIPPED | OUTPATIENT
Start: 2025-07-21 | End: 2025-07-24

## 2025-07-21 RX ADMIN — ONDANSETRON 4 MG: 2 INJECTION INTRAMUSCULAR; INTRAVENOUS at 14:00

## 2025-07-21 RX ADMIN — ALPRAZOLAM 0.5 MG: 0.25 TABLET ORAL at 11:19

## 2025-07-21 RX ADMIN — POTASSIUM BICARBONATE 40 MEQ: 782 TABLET, EFFERVESCENT ORAL at 13:47

## 2025-07-21 NOTE — ED PROVIDER NOTES
[facility]  Emergency Department  Faculty Attestation     I performed a history and physical examination of the patient and discussed management with the resident. I reviewed the resident’s note and agree with the documented findings and plan of care. Any areas of disagreement are noted on the chart. I was personally present for the key portions of any procedures. I have documented in the chart those procedures where I was not present during the key portions. I have reviewed the emergency nurses triage note. I agree with the chief complaint, past medical history, past surgical history, allergies, medications, social and family history as documented unless otherwise noted below.    For Physician Assistant/ Nurse Practitioner cases/documentation I have personally evaluated this patient and have completed at least one if not all key elements of the E/M (history, physical exam, and MDM). Additional findings are as noted.    Preliminary note started at 10:55 AM EDT    Primary Care Physician:  File, Not On    Screenings:  [unfilled]    CHIEF COMPLAINT       Chief Complaint   Patient presents with    Medication Refill     Pt reports he was evicted from where is was living and they threw out all his meds. Pt reports he is out of Xanax, clonidine and gabapentin        RECENT VITALS:   BP (!) 139/98   Pulse 86   Temp 97.9 °F (36.6 °C)   Resp 14   SpO2 99%     LABS:  Labs Reviewed   CBC WITH AUTO DIFFERENTIAL   COMPREHENSIVE METABOLIC PANEL   LIPASE   TROPONIN   TROPONIN       Radiology  XR CHEST PORTABLE    (Results Pending)       CRITICAL CARE: There was a high probability of clinically significant/life threatening deterioration in this patient's condition which required my urgent intervention.  Total critical care time was none minutes.  This excludes any time for separately reportable procedures.     EKG:    EKG Interpretation    Interpreted by me    Rhythm: normal sinus   Rate:

## 2025-07-22 ASSESSMENT — ENCOUNTER SYMPTOMS
COUGH: 0
SHORTNESS OF BREATH: 0
ABDOMINAL PAIN: 0

## 2025-07-22 NOTE — ED PROVIDER NOTES
[facility] EMERGENCY DEPARTMENT  Emergency Department Encounter  Emergency Medicine Resident     Pt Name:Kole Vega  MRN: [id number]  Birthdate 1985  Date of evaluation: 7/22/25  PCP:  File, Not On  Note Started: 7:48 AM EDT      CHIEF COMPLAINT       Chief Complaint   Patient presents with    Medication Refill     Pt reports he was evicted from where is was living and they threw out all his meds. Pt reports he is out of Xanax, clonidine and gabapentin        HISTORY OF PRESENT ILLNESS  (Location/Symptom, Timing/Onset, Context/Setting, Quality, Duration, Modifying Factors, Severity.)      Kole Vega is a 40 y.o. male who presents requesting medication refills.  Patient reports that he was recently evicted from his pain and had all of his medications thrown away.  He reports that he has not taken his medications in the past 5 days.  Reports that he has not had any Xanax clonidine or gabapentin.  He has been having nausea and vomiting as well as anxiety and withdrawal symptoms.  He believes that he has had 1 seizure.  He has a history of benzo withdrawal seizures.  This episode was not witnessed by anyone.  Unclear how long it lasted.  Seizure-like episodes happen several days ago.  He also reports some chest pain that has been ongoing for the past 5 days.  Denies any fever or chills or cough or shortness of breath abdominal pain.  Reports some nausea and vomiting.  He denies any dysuria frequency or urgency.  Has been able to tolerate orally.    PAST MEDICAL / SURGICAL / SOCIAL / FAMILY HISTORY      has a past medical history of Asthma, Community acquired pneumonia of left lower lobe of lung, MRSA (methicillin resistant staph aureus) culture positive, Seizures (HCC), and Viral hepatitis C.     has a past surgical history that includes Incision and drainage foot (Right, 12/11/2023) and Foot Debridement (Right, 12/11/2023).    Social History     Socioeconomic History    Marital status:

## 2025-07-24 LAB
EKG ATRIAL RATE: 75 BPM
EKG P AXIS: 141 DEGREES
EKG P-R INTERVAL: 136 MS
EKG Q-T INTERVAL: 400 MS
EKG QRS DURATION: 86 MS
EKG QTC CALCULATION (BAZETT): 444 MS
EKG R AXIS: 60 DEGREES
EKG T AXIS: 67 DEGREES
EKG VENTRICULAR RATE: 74 BPM

## 2025-07-24 PROCEDURE — 93010 ELECTROCARDIOGRAM REPORT: CPT | Performed by: INTERNAL MEDICINE

## (undated) DEVICE — Device

## (undated) DEVICE — YANKAUER,FLEXIBLE HANDLE,REGLR CAPACITY: Brand: MEDLINE INDUSTRIES, INC.

## (undated) DEVICE — GLOVE ORANGE PI 8 1/2   MSG9085

## (undated) DEVICE — SVMMC POD PK

## (undated) DEVICE — DRESSING PETRO W3XL8IN OIL EMUL N ADH GZ KNIT IMPREG CELOS

## (undated) DEVICE — GOWN,SIRUS,NONRNF,SETINSLV,2XL,18/CS: Brand: MEDLINE

## (undated) DEVICE — DRESSING NEG PRSS SM 10X7.5X3.3CM POLYUR FOR WND THER VAC

## (undated) DEVICE — STRAP,POSITIONING,KNEE/BODY,FOAM,4X60": Brand: MEDLINE

## (undated) DEVICE — CUFF REPROC TRNQT DPSB W/PLC BROWN 34IN

## (undated) DEVICE — BANDAGE,GAUZE,BULKEE II,4.5"X4.1YD,STRL: Brand: MEDLINE

## (undated) DEVICE — CANISTER NEG PRSS 1000ML W/ GEL INFOVAC

## (undated) DEVICE — GLOVE SURG SZ 85 L12IN FNGR ORTHO 126MIL CRM LTX FREE

## (undated) DEVICE — SUTURE VCRL SZ 2-0 L18IN ABSRB VLT L26MM SH 1/2 CIR J775D

## (undated) DEVICE — STRAP ARMBRD W1.5XL32IN FOAM STR YET SFT W/ HK AND LOOP

## (undated) DEVICE — ULTRASONIC SET TUBE SONIC 1 SONICVAC DISP